# Patient Record
Sex: MALE | Race: WHITE | Employment: OTHER | URBAN - METROPOLITAN AREA
[De-identification: names, ages, dates, MRNs, and addresses within clinical notes are randomized per-mention and may not be internally consistent; named-entity substitution may affect disease eponyms.]

---

## 2019-05-01 ENCOUNTER — HOSPITAL ENCOUNTER (EMERGENCY)
Facility: CLINIC | Age: 69
Discharge: STILL ADMITTED OR ADMINISTRATIVE | End: 2019-05-01
Attending: EMERGENCY MEDICINE
Payer: COMMERCIAL

## 2019-05-01 ENCOUNTER — NURSE TRIAGE (OUTPATIENT)
Dept: NURSING | Facility: CLINIC | Age: 69
End: 2019-05-01

## 2019-05-01 ENCOUNTER — HOSPITAL ENCOUNTER (EMERGENCY)
Facility: CLINIC | Age: 69
Discharge: HOME | End: 2019-05-01
Attending: EMERGENCY MEDICINE | Admitting: EMERGENCY MEDICINE
Payer: COMMERCIAL

## 2019-05-01 VITALS
TEMPERATURE: 97.6 F | BODY MASS INDEX: 28.43 KG/M2 | DIASTOLIC BLOOD PRESSURE: 89 MMHG | WEIGHT: 187 LBS | SYSTOLIC BLOOD PRESSURE: 162 MMHG | OXYGEN SATURATION: 99 % | RESPIRATION RATE: 16 BRPM | HEART RATE: 78 BPM

## 2019-05-01 DIAGNOSIS — K80.50 BILIARY COLIC: ICD-10-CM

## 2019-05-01 DIAGNOSIS — K21.00 GASTROESOPHAGEAL REFLUX DISEASE WITH ESOPHAGITIS: ICD-10-CM

## 2019-05-01 DIAGNOSIS — R10.13 EPIGASTRIC PAIN: ICD-10-CM

## 2019-05-01 LAB
ALBUMIN SERPL-MCNC: 4 G/DL (ref 3.4–5)
ALP SERPL-CCNC: 118 U/L (ref 40–150)
ALT SERPL W P-5'-P-CCNC: 19 U/L (ref 0–70)
ANION GAP SERPL CALCULATED.3IONS-SCNC: 7 MMOL/L (ref 3–14)
AST SERPL W P-5'-P-CCNC: 9 U/L (ref 0–45)
BASOPHILS # BLD AUTO: 0.1 10E9/L (ref 0–0.2)
BASOPHILS NFR BLD AUTO: 0.8 %
BILIRUB SERPL-MCNC: 0.4 MG/DL (ref 0.2–1.3)
BUN SERPL-MCNC: 17 MG/DL (ref 7–30)
CALCIUM SERPL-MCNC: 9.1 MG/DL (ref 8.5–10.1)
CHLORIDE SERPL-SCNC: 105 MMOL/L (ref 94–109)
CO2 SERPL-SCNC: 27 MMOL/L (ref 20–32)
CREAT SERPL-MCNC: 1.02 MG/DL (ref 0.66–1.25)
DIFFERENTIAL METHOD BLD: NORMAL
EOSINOPHIL # BLD AUTO: 0.2 10E9/L (ref 0–0.7)
EOSINOPHIL NFR BLD AUTO: 1.7 %
ERYTHROCYTE [DISTWIDTH] IN BLOOD BY AUTOMATED COUNT: 12.9 % (ref 10–15)
GFR SERPL CREATININE-BSD FRML MDRD: 76 ML/MIN/{1.73_M2}
GLUCOSE SERPL-MCNC: 103 MG/DL (ref 70–99)
HCT VFR BLD AUTO: 43.3 % (ref 40–53)
HGB BLD-MCNC: 14.6 G/DL (ref 13.3–17.7)
IMM GRANULOCYTES # BLD: 0 10E9/L (ref 0–0.4)
IMM GRANULOCYTES NFR BLD: 0.2 %
LIPASE SERPL-CCNC: 209 U/L (ref 73–393)
LYMPHOCYTES # BLD AUTO: 1.9 10E9/L (ref 0.8–5.3)
LYMPHOCYTES NFR BLD AUTO: 18.5 %
MCH RBC QN AUTO: 29.1 PG (ref 26.5–33)
MCHC RBC AUTO-ENTMCNC: 33.7 G/DL (ref 31.5–36.5)
MCV RBC AUTO: 86 FL (ref 78–100)
MONOCYTES # BLD AUTO: 1.1 10E9/L (ref 0–1.3)
MONOCYTES NFR BLD AUTO: 11.1 %
NEUTROPHILS # BLD AUTO: 6.9 10E9/L (ref 1.6–8.3)
NEUTROPHILS NFR BLD AUTO: 67.7 %
NRBC # BLD AUTO: 0 10*3/UL
NRBC BLD AUTO-RTO: 0 /100
PLATELET # BLD AUTO: 242 10E9/L (ref 150–450)
POTASSIUM SERPL-SCNC: 3.4 MMOL/L (ref 3.4–5.3)
PROT SERPL-MCNC: 7.7 G/DL (ref 6.8–8.8)
RBC # BLD AUTO: 5.01 10E12/L (ref 4.4–5.9)
SODIUM SERPL-SCNC: 139 MMOL/L (ref 133–144)
WBC # BLD AUTO: 10.1 10E9/L (ref 4–11)

## 2019-05-01 PROCEDURE — 25000128 H RX IP 250 OP 636: Performed by: EMERGENCY MEDICINE

## 2019-05-01 PROCEDURE — 99285 EMERGENCY DEPT VISIT HI MDM: CPT | Mod: 25 | Performed by: EMERGENCY MEDICINE

## 2019-05-01 PROCEDURE — 76705 ECHO EXAM OF ABDOMEN: CPT | Performed by: EMERGENCY MEDICINE

## 2019-05-01 PROCEDURE — 25000132 ZZH RX MED GY IP 250 OP 250 PS 637: Performed by: EMERGENCY MEDICINE

## 2019-05-01 PROCEDURE — 83690 ASSAY OF LIPASE: CPT | Performed by: EMERGENCY MEDICINE

## 2019-05-01 PROCEDURE — 80053 COMPREHEN METABOLIC PANEL: CPT | Performed by: EMERGENCY MEDICINE

## 2019-05-01 PROCEDURE — 85025 COMPLETE CBC W/AUTO DIFF WBC: CPT | Performed by: EMERGENCY MEDICINE

## 2019-05-01 PROCEDURE — 96374 THER/PROPH/DIAG INJ IV PUSH: CPT | Performed by: EMERGENCY MEDICINE

## 2019-05-01 PROCEDURE — 76705 ECHO EXAM OF ABDOMEN: CPT | Mod: 26 | Performed by: EMERGENCY MEDICINE

## 2019-05-01 PROCEDURE — 99284 EMERGENCY DEPT VISIT MOD MDM: CPT | Mod: 25 | Performed by: EMERGENCY MEDICINE

## 2019-05-01 PROCEDURE — 25000125 ZZHC RX 250: Performed by: EMERGENCY MEDICINE

## 2019-05-01 RX ORDER — KETOROLAC TROMETHAMINE 15 MG/ML
15 INJECTION, SOLUTION INTRAMUSCULAR; INTRAVENOUS ONCE
Status: COMPLETED | OUTPATIENT
Start: 2019-05-01 | End: 2019-05-01

## 2019-05-01 RX ORDER — CARVEDILOL 6.25 MG/1
6.25 TABLET ORAL 2 TIMES DAILY WITH MEALS
COMMUNITY
End: 2019-12-06

## 2019-05-01 RX ORDER — AMLODIPINE BESYLATE 10 MG/1
10 TABLET ORAL DAILY
COMMUNITY
End: 2019-11-05

## 2019-05-01 RX ADMIN — LIDOCAINE HYDROCHLORIDE 30 ML: 20 SOLUTION ORAL; TOPICAL at 03:45

## 2019-05-01 RX ADMIN — KETOROLAC TROMETHAMINE 15 MG: 15 INJECTION, SOLUTION INTRAMUSCULAR; INTRAVENOUS at 03:49

## 2019-05-01 ASSESSMENT — ENCOUNTER SYMPTOMS
BLOOD IN STOOL: 0
NAUSEA: 1
ABDOMINAL PAIN: 1
DIARRHEA: 0
FEVER: 0
VOMITING: 1
SHORTNESS OF BREATH: 0
CONSTIPATION: 0

## 2019-05-01 NOTE — ED PROVIDER NOTES
History     Chief Complaint   Patient presents with     Abdominal Pain     pain, fullness, vomiting     HPI  Arley Lopez is a 68 year old male who has past medical history significant for GERD, hypertension, presenting to the emergency department with concerns regarding abdominal pain.  Patient states that he had a large meal, on both Saturday, in addition to Sunday.  Patient states he normally does not eat such significant large meals, however did have steak, and other foods 2 and 3 days ago.  The following day patient subsequently had yellowish vomit.  Those symptoms had since resolved.  Patient presents to the emergency department this evening because of upper abdominal fullness sensation, with achiness, and burning type sensation of the upper abdomen.  There is no vomiting during the day yesterday.  Patient did have hotdogs for dinner, and began experiencing worsening pain, especially with lying down this evening.  Last bowel movement was during the day, and reported to be normal.  No diarrhea.  No fever, or chills.  Denies any chest pain, cough, shortness of breath.    Allergies:  Allergies   Allergen Reactions     Penicillin G Hives and Swelling       Problem List:    Patient Active Problem List    Diagnosis Date Noted     CARDIOVASCULAR SCREENING; LDL GOAL LESS THAN 130 02/27/2013     Priority: Medium     GERD (gastroesophageal reflux disease) 07/12/2012     Priority: Medium     Essential hypertension 07/12/2012     Priority: Medium        Past Medical History:    Past Medical History:   Diagnosis Date     Kidney problem        Past Surgical History:    Past Surgical History:   Procedure Laterality Date     BACK SURGERY       COLONOSCOPY  May 2012    abnormal !     ENDOSCOPY  01/10/2011    Reflux duodenum, Hiatal hernia small, otherwise normal exam.     GENITOURINARY SURGERY      vasectomy     GENITOURINARY SURGERY      spermacele       Family History:    Family History   Problem Relation Age of Onset      Cancer Mother        Social History:  Marital Status:   [2]  Social History     Tobacco Use     Smoking status: Never Smoker     Smokeless tobacco: Never Used   Substance Use Topics     Alcohol use: Yes     Drug use: No        Medications:      amLODIPine (NORVASC) 10 MG tablet   carvedilol (COREG) 6.25 MG tablet   aspirin 81 MG tablet   ibuprofen (ADVIL,MOTRIN) 600 MG tablet   omeprazole (PRILOSEC) 40 MG capsule   ORDER FOR DME   ORDER FOR DME   ORDER FOR DME         Review of Systems   Constitutional: Negative for fever.   Respiratory: Negative for shortness of breath.    Cardiovascular: Negative for chest pain.   Gastrointestinal: Positive for abdominal pain, nausea and vomiting. Negative for blood in stool, constipation and diarrhea.   All other systems reviewed and are negative.      Physical Exam   BP: (!) 181/96  Heart Rate: 92  Temp: 97.6  F (36.4  C)  Resp: 16  Weight: 84.8 kg (187 lb)  SpO2: 100 %      Physical Exam  BP (!) 181/96   Temp 97.6  F (36.4  C) (Oral)   Resp 16   Wt 84.8 kg (187 lb)   SpO2 100%   BMI 28.43 kg/m    General: alert, interactive, in no apparent distress  Head: atraumatic  Nose: no rhinorrhea or epistaxis  Ears: no external auditory canal discharge or bleeding.    Eyes: Sclera nonicteric. Conjunctiva noninjected. PERRL, EOMI  Mouth: no tonsillar erythema, edema, or exudate  Neck: supple, no palp LAD  Lungs: CTAB  CV: RRR, S1/S2; peripheral pulses palpable and symmetric  Abdomen: soft, slight tenderness to palpation of the upper mid abdomen., nd, no guarding or rebound. Positive bowel sounds  Extremities: no cyanosis or edema  Skin: no rash or diaphoresis  Neuro:   strength 5/5 in UE and LEs bilaterally, sensation intact to light touch in UE and LEs bilaterally;       ED Course        Procedures    Results for orders placed during the hospital encounter of 05/01/19   POC US ABDOMEN LIMITED    [facility] Procedure Note      Limited Bedside ED Gallbladder   Ultrasound:    PROCEDURE: PERFORMED BY: Dr. Kevin Cortes  INDICATIONS:  RUQ/Epigastric Pain  PROBE:  Low frequency convex probe  BODY LOCATION: Abdomen  FINDINGS:   An ultrasound of the gallbladder was performed using longitudinal and transverse views.  Gallstone(s):  Present  Gallbladder sludge:  Absent  Sonographic Jones's sign:  Absent  Gallbladder wall thickening (greater than 4 mm):  Absent  Pericholecystic fluid: Absent  Common bile duct (dilated if internal diameter greater than 6 mm): Unable to visualize   INTERPRETATION:  Gallstones are present.  IMAGE DOCUMENTATION: Images were archived to PACs system.               Critical Care time:  none               Results for orders placed or performed during the hospital encounter of 05/01/19 (from the past 24 hour(s))   POC US ABDOMEN LIMITED    [facility] Procedure Note      Limited Bedside ED Gallbladder  Ultrasound:    PROCEDURE: PERFORMED BY: Dr. Kevin Cortes  INDICATIONS:  RUQ/Epigastric Pain  PROBE:  Low frequency convex probe  BODY LOCATION: Abdomen  FINDINGS:   An ultrasound of the gallbladder was performed using longitudinal and transverse views.  Gallstone(s):  Present  Gallbladder sludge:  Absent  Sonographic Jones's sign:  Absent  Gallbladder wall thickening (greater than 4 mm):  Absent  Pericholecystic fluid: Absent  Common bile duct (dilated if internal diameter greater than 6 mm): Unable to visualize   INTERPRETATION:  Gallstones are present.  IMAGE DOCUMENTATION: Images were archived to PACs system.     CBC with platelets differential   Result Value Ref Range    WBC 10.1 4.0 - 11.0 10e9/L    RBC Count 5.01 4.4 - 5.9 10e12/L    Hemoglobin 14.6 13.3 - 17.7 g/dL    Hematocrit 43.3 40.0 - 53.0 %    MCV 86 78 - 100 fl    MCH 29.1 26.5 - 33.0 pg    MCHC 33.7 31.5 - 36.5 g/dL    RDW 12.9 10.0 - 15.0 %    Platelet Count 242 150 - 450 10e9/L    Diff Method Automated Method     % Neutrophils 67.7 %    % Lymphocytes 18.5 %     % Monocytes 11.1 %    % Eosinophils 1.7 %    % Basophils 0.8 %    % Immature Granulocytes 0.2 %    Nucleated RBCs 0 0 /100    Absolute Neutrophil 6.9 1.6 - 8.3 10e9/L    Absolute Lymphocytes 1.9 0.8 - 5.3 10e9/L    Absolute Monocytes 1.1 0.0 - 1.3 10e9/L    Absolute Eosinophils 0.2 0.0 - 0.7 10e9/L    Absolute Basophils 0.1 0.0 - 0.2 10e9/L    Abs Immature Granulocytes 0.0 0 - 0.4 10e9/L    Absolute Nucleated RBC 0.0    Comprehensive metabolic panel   Result Value Ref Range    Sodium 139 133 - 144 mmol/L    Potassium 3.4 3.4 - 5.3 mmol/L    Chloride 105 94 - 109 mmol/L    Carbon Dioxide 27 20 - 32 mmol/L    Anion Gap 7 3 - 14 mmol/L    Glucose 103 (H) 70 - 99 mg/dL    Urea Nitrogen 17 7 - 30 mg/dL    Creatinine 1.02 0.66 - 1.25 mg/dL    GFR Estimate 76 >60 mL/min/[1.73_m2]    GFR Estimate If Black 88 >60 mL/min/[1.73_m2]    Calcium 9.1 8.5 - 10.1 mg/dL    Bilirubin Total 0.4 0.2 - 1.3 mg/dL    Albumin 4.0 3.4 - 5.0 g/dL    Protein Total 7.7 6.8 - 8.8 g/dL    Alkaline Phosphatase 118 40 - 150 U/L    ALT 19 0 - 70 U/L    AST 9 0 - 45 U/L   Lipase   Result Value Ref Range    Lipase 209 73 - 393 U/L       Medications   lidocaine VISCOUS (XYLOCAINE) 2 % 15 mL, alum & mag hydroxide-simethicone (MYLANTA ES/MAALOX  ES) 15 mL GI Cocktail (30 mLs Oral Given 5/1/19 0345)   ketorolac (TORADOL) injection 15 mg (15 mg Intravenous Given 5/1/19 0349)       Assessments & Plan (with Medical Decision Making)  68 year old male, with history of GERD, and hypertension, presenting to the emergency department with concerns regarding upper abdominal fullness sensation that worsened during the evening and nighttime hours this evening.  Patient arrives afebrile, well-appearing, slightly hypertensive.  He does have slight amounts of tenderness of the epigastric region.  Differential includes gastritis, GERD, biliary colic, viral illness, pancreatitis.    IV established, and labs drawn.  Bedside ultrasound performed showing gallstone which  is present, however negative sonographic Jones's, with no pericholecystic fluid, normal gallbladder wall thickness.  Therefore, do not feel that acute cholecystitis is likely.  Additionally white blood cell count is normal.  CMP and lipase normal.  Patient did have vomiting after eating fattening type meal, with pain after eating steak, and pork meal.  Additionally, patient had heart drugs this evening.  Therefore, I feel that biliary colic is likely a contributing cause of his symptoms.  Patient also with known GERD.  May have component of GERD/gastritis as well.  Patient encouraged continued omeprazole.  Maalox as needed.  Follow-up in clinic, and return if worsening symptoms.  Consider outpatient surgery referral if ongoing symptoms.     I have reviewed the nursing notes.    I have reviewed the findings, diagnosis, plan and need for follow up with the patient.          Medication List      There are no discharge medications for this visit.         Final diagnoses:   Epigastric pain   Biliary colic   Gastroesophageal reflux disease with esophagitis       5/1/2019   [facility] EMERGENCY DEPARTMENT     Kevin Cortes MD  05/01/19 0433

## 2019-05-01 NOTE — ED AVS SNAPSHOT
[facility] Emergency Department  [address] [address]  Phone:  [phone]  Fax:  [phone]                                    Arley Lopez   MRN: [id number]    Department:  [facility] Emergency Department   Date of Visit:  5/1/2019           After Visit Summary Signature Page    I have received my discharge instructions, and my questions have been answered. I have discussed any challenges I see with this plan with the nurse or doctor.    ..........................................................................................................................................  Patient/Patient Representative Signature      ..........................................................................................................................................  Patient Representative Print Name and Relationship to Patient    ..................................................               ................................................  Date                                   Time    ..........................................................................................................................................  Reviewed by Signature/Title    ...................................................              ..............................................  Date                                               Time          22EPIC Rev 08/18

## 2019-05-01 NOTE — TELEPHONE ENCOUNTER
Patient wants to skip the step of going to the clinic before seeing a surgeon.  I told him he can call [employer] to get a list of surgeons that do gall bladder surgery to set up that appointment then contact a clinic to get the pre-op physical set up.  Ashlee Almazan RN-[facility]

## 2019-05-01 NOTE — DISCHARGE INSTRUCTIONS
Recommend continuing omeprazole.   Can take zantac/pepcid or tums.   Can also try maalox with the pains.   Follow up in clinic if ongoing symptoms.

## 2019-05-03 ENCOUNTER — OFFICE VISIT (OUTPATIENT)
Dept: SURGERY | Facility: CLINIC | Age: 69
End: 2019-05-03
Payer: COMMERCIAL

## 2019-05-03 VITALS
TEMPERATURE: 98.3 F | HEIGHT: 68 IN | BODY MASS INDEX: 28.34 KG/M2 | HEART RATE: 76 BPM | DIASTOLIC BLOOD PRESSURE: 79 MMHG | SYSTOLIC BLOOD PRESSURE: 134 MMHG | WEIGHT: 187 LBS

## 2019-05-03 DIAGNOSIS — R10.13 EPIGASTRIC PAIN: Primary | ICD-10-CM

## 2019-05-03 PROCEDURE — 99204 OFFICE O/P NEW MOD 45 MIN: CPT | Performed by: SURGERY

## 2019-05-03 ASSESSMENT — MIFFLIN-ST. JEOR: SCORE: 1594.79

## 2019-05-03 NOTE — PROGRESS NOTES
68-year-old male recently seen in the emergency room complaining of epigastric upper abdominal pain.  This last weekend patient had a steak and potatoes one night and a pork chop the following day.  Afterwards, he had severe pain in his epigastrium without radiation.  He reported nausea and vomited a thin yellow substance.  He did well on Monday but on Tuesday he ate a hot dog which reproduce the symptoms and by 1 AM he presented to the emergency room with severe epigastric abdominal pain with a feeling of bloating.  Patient had a GI cocktail that almost immediately relieved his symptoms.  An ultrasound was done in the emergency room showing several small stones in the gallbladder.    Patient Active Problem List   Diagnosis     GERD (gastroesophageal reflux disease)     Essential hypertension     CARDIOVASCULAR SCREENING; LDL GOAL LESS THAN 130       Past Medical History:   Diagnosis Date     Kidney problem        Past Surgical History:   Procedure Laterality Date     BACK SURGERY       COLONOSCOPY  May 2012    abnormal !     ENDOSCOPY  01/10/2011    Reflux duodenum, Hiatal hernia small, otherwise normal exam.     GENITOURINARY SURGERY      vasectomy     GENITOURINARY SURGERY      spermacele       Family History   Problem Relation Age of Onset     Cancer Mother        Social History     Tobacco Use     Smoking status: Never Smoker     Smokeless tobacco: Never Used   Substance Use Topics     Alcohol use: Yes        History   Drug Use No       Current Outpatient Medications   Medication Sig Dispense Refill     amLODIPine (NORVASC) 10 MG tablet Take 10 mg by mouth daily       carvedilol (COREG) 6.25 MG tablet Take 6.25 mg by mouth 2 times daily (with meals)       omeprazole (PRILOSEC) 40 MG capsule Take 1 capsule (40 mg) by mouth 2 times daily (Patient taking differently: Take 20 mg by mouth 2 times daily ) 180 capsule 2     aspirin 81 MG tablet Take 1 tablet by mouth daily.       ibuprofen (ADVIL,MOTRIN) 600 MG tablet  Take 1 tablet (600 mg) by mouth every 6 hours as needed for moderate pain 90 tablet 0     ORDER FOR DME medium CAM walker - short 1 Device 1     ORDER FOR DME Larry Ankle Brace - Large Tri Tatiana 1 Device 0     ORDER FOR DME Superfeet inserts 1 Device 0       Allergies   Allergen Reactions     Penicillin G Hives and Swelling      CBC  Recent Labs   Lab Test 05/01/19  0342   WBC 10.1   RBC 5.01   HGB 14.6   HCT 43.3   MCV 86   MCH 29.1   MCHC 33.7   RDW 12.9          BMP  Recent Labs   Lab Test 05/01/19  0342      POTASSIUM 3.4   YARELY 9.1   CHLORIDE 105   CO2 27   BUN 17   CR 1.02   *       LFTs  Recent Labs   Lab Test 05/01/19  0342   PROTTOTAL 7.7   ALBUMIN 4.0   BILITOTAL 0.4   ALKPHOS 118   AST 9   ALT 19     Results for orders placed or performed during the hospital encounter of 05/01/19   POC US ABDOMEN LIMITED    [facility] Procedure Note      Limited Bedside ED Gallbladder  Ultrasound:    PROCEDURE: PERFORMED BY: Dr. Kevin Cortes  INDICATIONS:  RUQ/Epigastric Pain  PROBE:  Low frequency convex probe  BODY LOCATION: Abdomen  FINDINGS:   An ultrasound of the gallbladder was performed using longitudinal and transverse views.  Gallstone(s):  Present  Gallbladder sludge:  Absent  Sonographic Jones's sign:  Absent  Gallbladder wall thickening (greater than 4 mm):  Absent  Pericholecystic fluid: Absent  Common bile duct (dilated if internal diameter greater than 6 mm): Unable to visualize   INTERPRETATION:  Gallstones are present.  IMAGE DOCUMENTATION: Images were archived to PACs system.       ROS  Constitutional - Denies fevers, weight loss, malaise, lethargy  Neuro - Denies tremors or seizures  Pulmon - Denies SOB, dyspnea, hemoptysis, chronic cough or use of an inhaler  CV - Denies CP, SOB, lower extremity edema, difficulty w/ stairs, has never used NTG  GI - Denies hematemesis, BRBPR, melena, chronic diarrhea or epigastric pain   - Denies hematuria, difficulty  "voiding, h/o STDs  Hematology - Denies blood clotting disorders, chronic anemias  Dermatology - No melanomas or skin cancers  Rheumatology - No h/o RA  Pysch - Denies depression, bipolar d/o or schizophrenia    Exam:/79 (BP Location: Right arm, Patient Position: Sitting, Cuff Size: Adult Regular)   Pulse 76   Temp 98.3  F (36.8  C) (Tympanic)   Ht 1.715 m (5' 7.5\")   Wt 84.8 kg (187 lb)   BMI 28.86 kg/m      General - Alert and Oriented X4, NAD, well nourished  HEENT - Normocephalic, atraumatic  Neck - supple, no LAD, Thyroid normal, Carotids without bruits  Lungs - Clear to auscultation bilaterally with good inspiratory effort, no tactile fremitus  CV - Heart RRR, no lift's, thrills, murmurs, rubs, or gallops. Carotid, radial, and femoral pulses 2+ bilaterally  Abdomen - Soft, non-tender, +BS, no hepatosplenomegaly, no palpable masses  Neuro - Full ROM, Strength 5/5 and major muscle groups, sensation intact  Extremities - No cyanosis, clubbing or edema    Assessment and plan: 68-year-old male with epigastric abdominal pain following eating heavy foods such as steak.  Unsure whether the gallstones seen on the bedside ultrasound are causing his symptoms.  I will go ahead and order a formal ultrasound to better evaluate the size and location of the stones.  His symptoms are more consistent with acid indigestion and reflux.  I cannot recommend surgery at this time.  However, patient is planning on eating pizza tonight and he is going to be very aware of his symptoms afterwards.  Also, I have asked him to see me after the ultrasound and we can reevaluate these new images plus his symptoms from this coming weekend, to see if he is a candidate for cholecystectomy.  Return to clinic next Wednesday after the ultrasound    Yehuda Puga MD   "

## 2019-05-03 NOTE — LETTER
5/3/2019         RE: Arley Lopez  [address]  [place] [address]        Dear Colleague,    Thank you for referring your patient, Arley Lopez, to the [facility]. Please see a copy of my visit note below.    68-year-old male recently seen in the emergency room complaining of epigastric upper abdominal pain.  This last weekend patient had a steak and potatoes one night and a pork chop the following day.  Afterwards, he had severe pain in his epigastrium without radiation.  He reported nausea and vomited a thin yellow substance.  He did well on Monday but on Tuesday he ate a hot dog which reproduce the symptoms and by 1 AM he presented to the emergency room with severe epigastric abdominal pain with a feeling of bloating.  Patient had a GI cocktail that almost immediately relieved his symptoms.  An ultrasound was done in the emergency room showing several small stones in the gallbladder.    Patient Active Problem List   Diagnosis     GERD (gastroesophageal reflux disease)     Essential hypertension     CARDIOVASCULAR SCREENING; LDL GOAL LESS THAN 130       Past Medical History:   Diagnosis Date     Kidney problem        Past Surgical History:   Procedure Laterality Date     BACK SURGERY       COLONOSCOPY  May 2012    abnormal !     ENDOSCOPY  01/10/2011    Reflux duodenum, Hiatal hernia small, otherwise normal exam.     GENITOURINARY SURGERY      vasectomy     GENITOURINARY SURGERY      spermacele       Family History   Problem Relation Age of Onset     Cancer Mother        Social History     Tobacco Use     Smoking status: Never Smoker     Smokeless tobacco: Never Used   Substance Use Topics     Alcohol use: Yes        History   Drug Use No       Current Outpatient Medications   Medication Sig Dispense Refill     amLODIPine (NORVASC) 10 MG tablet Take 10 mg by mouth daily       carvedilol (COREG) 6.25 MG tablet Take 6.25 mg by mouth 2 times daily (with meals)       omeprazole (PRILOSEC) 40  MG capsule Take 1 capsule (40 mg) by mouth 2 times daily (Patient taking differently: Take 20 mg by mouth 2 times daily ) 180 capsule 2     aspirin 81 MG tablet Take 1 tablet by mouth daily.       ibuprofen (ADVIL,MOTRIN) 600 MG tablet Take 1 tablet (600 mg) by mouth every 6 hours as needed for moderate pain 90 tablet 0     ORDER FOR DME medium CAM walker - short 1 Device 1     ORDER FOR DME Larry Ankle Brace - Large Tri Tataina 1 Device 0     ORDER FOR DME Superfeet inserts 1 Device 0       Allergies   Allergen Reactions     Penicillin G Hives and Swelling      CBC  Recent Labs   Lab Test 05/01/19  0342   WBC 10.1   RBC 5.01   HGB 14.6   HCT 43.3   MCV 86   MCH 29.1   MCHC 33.7   RDW 12.9          BMP  Recent Labs   Lab Test 05/01/19  0342      POTASSIUM 3.4   YARELY 9.1   CHLORIDE 105   CO2 27   BUN 17   CR 1.02   *       LFTs  Recent Labs   Lab Test 05/01/19  0342   PROTTOTAL 7.7   ALBUMIN 4.0   BILITOTAL 0.4   ALKPHOS 118   AST 9   ALT 19     Results for orders placed or performed during the hospital encounter of 05/01/19   POC US ABDOMEN LIMITED    [facility] Procedure Note      Limited Bedside ED Gallbladder  Ultrasound:    PROCEDURE: PERFORMED BY: Dr. Kevin Cortes  INDICATIONS:  RUQ/Epigastric Pain  PROBE:  Low frequency convex probe  BODY LOCATION: Abdomen  FINDINGS:   An ultrasound of the gallbladder was performed using longitudinal and transverse views.  Gallstone(s):  Present  Gallbladder sludge:  Absent  Sonographic Jones's sign:  Absent  Gallbladder wall thickening (greater than 4 mm):  Absent  Pericholecystic fluid: Absent  Common bile duct (dilated if internal diameter greater than 6 mm): Unable to visualize   INTERPRETATION:  Gallstones are present.  IMAGE DOCUMENTATION: Images were archived to PACs system.       ROS  Constitutional - Denies fevers, weight loss, malaise, lethargy  Neuro - Denies tremors or seizures  Pulmon - Denies SOB, dyspnea, hemoptysis,  "chronic cough or use of an inhaler  CV - Denies CP, SOB, lower extremity edema, difficulty w/ stairs, has never used NTG  GI - Denies hematemesis, BRBPR, melena, chronic diarrhea or epigastric pain   - Denies hematuria, difficulty voiding, h/o STDs  Hematology - Denies blood clotting disorders, chronic anemias  Dermatology - No melanomas or skin cancers  Rheumatology - No h/o RA  Pysch - Denies depression, bipolar d/o or schizophrenia    Exam:/79 (BP Location: Right arm, Patient Position: Sitting, Cuff Size: Adult Regular)   Pulse 76   Temp 98.3  F (36.8  C) (Tympanic)   Ht 1.715 m (5' 7.5\")   Wt 84.8 kg (187 lb)   BMI 28.86 kg/m       General - Alert and Oriented X4, NAD, well nourished  HEENT - Normocephalic, atraumatic  Neck - supple, no LAD, Thyroid normal, Carotids without bruits  Lungs - Clear to auscultation bilaterally with good inspiratory effort, no tactile fremitus  CV - Heart RRR, no lift's, thrills, murmurs, rubs, or gallops. Carotid, radial, and femoral pulses 2+ bilaterally  Abdomen - Soft, non-tender, +BS, no hepatosplenomegaly, no palpable masses  Neuro - Full ROM, Strength 5/5 and major muscle groups, sensation intact  Extremities - No cyanosis, clubbing or edema    Assessment and plan: 68-year-old male with epigastric abdominal pain following eating heavy foods such as steak.  Unsure whether the gallstones seen on the bedside ultrasound are causing his symptoms.  I will go ahead and order a formal ultrasound to better evaluate the size and location of the stones.  His symptoms are more consistent with acid indigestion and reflux.  I cannot recommend surgery at this time.  However, patient is planning on eating pizza tonight and he is going to be very aware of his symptoms afterwards.  Also, I have asked him to see me after the ultrasound and we can reevaluate these new images plus his symptoms from this coming weekend, to see if he is a candidate for cholecystectomy.  Return to clinic " next Wednesday after the ultrasound    Yehuda Puga MD     Again, thank you for allowing me to participate in the care of your patient.        Sincerely,        Yehuda Puga MD

## 2019-05-03 NOTE — NURSING NOTE
"Initial /79 (BP Location: Right arm, Patient Position: Sitting, Cuff Size: Adult Regular)   Pulse 76   Temp 98.3  F (36.8  C) (Tympanic)   Ht 1.715 m (5' 7.5\")   Wt 84.8 kg (187 lb)   BMI 28.86 kg/m   Estimated body mass index is 28.86 kg/m  as calculated from the following:    Height as of this encounter: 1.715 m (5' 7.5\").    Weight as of this encounter: 84.8 kg (187 lb). .    Katie Black MA    "

## 2019-05-06 ENCOUNTER — HOSPITAL ENCOUNTER (OUTPATIENT)
Dept: ULTRASOUND IMAGING | Facility: CLINIC | Age: 69
Discharge: HOME | End: 2019-05-06
Attending: SURGERY | Admitting: SURGERY
Payer: COMMERCIAL

## 2019-05-06 DIAGNOSIS — R10.13 EPIGASTRIC PAIN: ICD-10-CM

## 2019-05-06 PROCEDURE — 76705 ECHO EXAM OF ABDOMEN: CPT

## 2019-05-08 ENCOUNTER — OFFICE VISIT (OUTPATIENT)
Dept: SURGERY | Facility: CLINIC | Age: 69
End: 2019-05-08
Payer: COMMERCIAL

## 2019-05-08 VITALS
BODY MASS INDEX: 28.34 KG/M2 | HEIGHT: 68 IN | SYSTOLIC BLOOD PRESSURE: 157 MMHG | DIASTOLIC BLOOD PRESSURE: 80 MMHG | TEMPERATURE: 98.2 F | HEART RATE: 81 BPM | WEIGHT: 187 LBS

## 2019-05-08 DIAGNOSIS — R10.13 EPIGASTRIC PAIN: Primary | ICD-10-CM

## 2019-05-08 PROCEDURE — 99212 OFFICE O/P EST SF 10 MIN: CPT | Performed by: SURGERY

## 2019-05-08 RX ORDER — OMEPRAZOLE 40 MG/1
40 CAPSULE, DELAYED RELEASE ORAL 2 TIMES DAILY
Qty: 180 CAPSULE | Refills: 3 | Status: ON HOLD | OUTPATIENT
Start: 2019-05-08 | End: 2019-11-25

## 2019-05-08 ASSESSMENT — MIFFLIN-ST. JEOR: SCORE: 1594.79

## 2019-05-08 NOTE — NURSING NOTE
"Initial /80 (BP Location: Right arm, Patient Position: Sitting, Cuff Size: Adult Regular)   Pulse 81   Temp 98.2  F (36.8  C) (Tympanic)   Ht 1.715 m (5' 7.5\")   Wt 84.8 kg (187 lb)   BMI 28.86 kg/m   Estimated body mass index is 28.86 kg/m  as calculated from the following:    Height as of this encounter: 1.715 m (5' 7.5\").    Weight as of this encounter: 84.8 kg (187 lb). .    Katie Black MA    "

## 2019-05-08 NOTE — PROGRESS NOTES
Patient here for follow-up visit.  Patient had an outpatient ultrasound which showed a few small gallstones in his gallbladder without evidence of ductal dilatation or wall thickening.  Patient ate pizza last week with no adverse effects.  He still reports reflux and heartburn when he eats a heavy meal such as steak or pork chops.  He takes Prilosec for these.  He currently takes Prilosec 20 mg in the morning and nothing in the evening.    Assessment and plan: 68-year-old male with asymptomatic cholelithiasis.  I cannot recommend laparoscopic cholecystectomy as as I do not believe his gallstones are causing him problems.  I think his major problem is acid reflux after he eats a heavy meal.  I asked him to increase his Prilosec to 40 mg twice daily and avoid eating heavy meals within several hours of sleeping.  Patient understood and we will avoid gallbladder removal at this time and he will just work on his acid reflux.  He is welcome to return to clinic at any time.    Yehuda Puga MD

## 2019-05-08 NOTE — LETTER
5/8/2019         RE: Arley Lopez  [address]  [facility] [address]        Dear Colleague,    Thank you for referring your patient, Arley Lopez, to the [facility]. Please see a copy of my visit note below.    Patient here for follow-up visit.  Patient had an outpatient ultrasound which showed a few small gallstones in his gallbladder without evidence of ductal dilatation or wall thickening.  Patient ate pizza last week with no adverse effects.  He still reports reflux and heartburn when he eats a heavy meal such as steak or pork chops.  He takes Prilosec for these.  He currently takes Prilosec 20 mg in the morning and nothing in the evening.    Assessment and plan: 68-year-old male with asymptomatic cholelithiasis.  I cannot recommend laparoscopic cholecystectomy as as I do not believe his gallstones are causing him problems.  I think his major problem is acid reflux after he eats a heavy meal.  I asked him to increase his Prilosec to 40 mg twice daily and avoid eating heavy meals within several hours of sleeping.  Patient understood and we will avoid gallbladder removal at this time and he will just work on his acid reflux.  He is welcome to return to clinic at any time.    Yehuda Puga MD    Again, thank you for allowing me to participate in the care of your patient.        Sincerely,        Yehuda Puga MD

## 2019-05-10 ENCOUNTER — TELEPHONE (OUTPATIENT)
Dept: SURGERY | Facility: CLINIC | Age: 69
End: 2019-05-10

## 2019-05-10 DIAGNOSIS — K21.9 GERD (GASTROESOPHAGEAL REFLUX DISEASE): ICD-10-CM

## 2019-05-10 RX ORDER — OMEPRAZOLE 40 MG/1
40 CAPSULE, DELAYED RELEASE ORAL 2 TIMES DAILY
Qty: 180 CAPSULE | Refills: 2 | Status: SHIPPED | OUTPATIENT
Start: 2019-05-10 | End: 2019-11-05

## 2019-05-10 NOTE — TELEPHONE ENCOUNTER
[facility] is asking if dosage increase is correct for Omeprazole? Please call [phone]

## 2019-11-05 ENCOUNTER — ANCILLARY PROCEDURE (OUTPATIENT)
Dept: GENERAL RADIOLOGY | Facility: CLINIC | Age: 69
End: 2019-11-05
Attending: FAMILY MEDICINE
Payer: COMMERCIAL

## 2019-11-05 ENCOUNTER — OFFICE VISIT (OUTPATIENT)
Dept: FAMILY MEDICINE | Facility: CLINIC | Age: 69
End: 2019-11-05
Payer: COMMERCIAL

## 2019-11-05 VITALS
HEART RATE: 94 BPM | HEIGHT: 67 IN | DIASTOLIC BLOOD PRESSURE: 78 MMHG | SYSTOLIC BLOOD PRESSURE: 138 MMHG | TEMPERATURE: 98.8 F | OXYGEN SATURATION: 97 % | BODY MASS INDEX: 28.47 KG/M2 | WEIGHT: 181.4 LBS

## 2019-11-05 DIAGNOSIS — M25.512 CHRONIC LEFT SHOULDER PAIN: ICD-10-CM

## 2019-11-05 DIAGNOSIS — G89.29 CHRONIC LEFT SHOULDER PAIN: ICD-10-CM

## 2019-11-05 DIAGNOSIS — Z13.1 SCREENING FOR DIABETES MELLITUS: ICD-10-CM

## 2019-11-05 DIAGNOSIS — I10 ESSENTIAL HYPERTENSION: Primary | ICD-10-CM

## 2019-11-05 DIAGNOSIS — Z13.220 LIPID SCREENING: ICD-10-CM

## 2019-11-05 PROCEDURE — G0008 ADMIN INFLUENZA VIRUS VAC: HCPCS | Performed by: FAMILY MEDICINE

## 2019-11-05 PROCEDURE — 73030 X-RAY EXAM OF SHOULDER: CPT | Mod: LT

## 2019-11-05 PROCEDURE — 90662 IIV NO PRSV INCREASED AG IM: CPT | Performed by: FAMILY MEDICINE

## 2019-11-05 PROCEDURE — 99204 OFFICE O/P NEW MOD 45 MIN: CPT | Mod: 25 | Performed by: FAMILY MEDICINE

## 2019-11-05 RX ORDER — HYDROCHLOROTHIAZIDE 25 MG/1
25 TABLET ORAL DAILY
Qty: 90 TABLET | Refills: 3 | Status: SHIPPED | OUTPATIENT
Start: 2019-11-05 | End: 2020-02-05

## 2019-11-05 ASSESSMENT — ENCOUNTER SYMPTOMS
JOINT SWELLING: 0
COUGH: 0
HEADACHES: 0
SHORTNESS OF BREATH: 0
CHILLS: 0
NERVOUS/ANXIOUS: 0
MYALGIAS: 0
WEAKNESS: 0
ARTHRALGIAS: 1
PALPITATIONS: 0
SORE THROAT: 0
PARESTHESIAS: 0
DIZZINESS: 0
FEVER: 0

## 2019-11-05 ASSESSMENT — MIFFLIN-ST. JEOR: SCORE: 1552.49

## 2019-11-05 NOTE — PATIENT INSTRUCTIONS
Jack Lopez,    Thank you for allowing [facility] to manage your care.    I ordered some fasting blood work, please go to the laboratory to get your laboratory studies.  Please call [phone] to scheduled your laboratory appointment.     I ordered some xrays, please go to our radiology department to get your xrays.    I sent your prescriptions to your pharmacy.    For you high blood pressure, I am starting you on hydrochlorothiazide 25 mg daily.  Please discontinue amlodipine.     I made a sports medicine referral, they will be in 1-2 weeks to set up your appointment.  If you do not hear from them, please call the specialty number on your after visit.     Please allow 1-2 business days for our office to call you in regards to your laboratory/radiological studies.  If not done so, I encourage you to login into [email or web address] to review your results as well.     If you have any questions or concerns, please feel free to call us at [phone].    Please call Dr. Kwok to schedule your colonoscopy.  Your sigmoidoscopy in 2011 is good for only 5 years.     Sincerely,    Dr. Rice    Did you know?  You can schedule an e-Visit for certain simple non-emergent issue for your convenience.  To learn more about or start an eVisit, simply login to [employer], click  Visits  on top banner, click  Start a Virtual Visit  drop down, and click  Symptom-Specific E-Visit

## 2019-11-05 NOTE — PROGRESS NOTES
Subjective     Arley Lopez is a 69 year old male who presents to clinic today for the following health issues:    HPI   Chief Complaint   Patient presents with     Establish Care       Past Medical History:   Diagnosis Date     Essential hypertension      GERD (gastroesophageal reflux disease)      Kidney problem        Past Surgical History:   Procedure Laterality Date     COLONOSCOPY  May 2012    abnormal !     ENDOSCOPY  01/10/2011    Reflux duodenum, Hiatal hernia small, otherwise normal exam.     FOOT SURGERY Left      GENITOURINARY SURGERY      vasectomy     GENITOURINARY SURGERY      spermacele       Family History   Problem Relation Age of Onset     Cancer Mother      Rheumatic fever Mother      Suicide Father        Social History     Tobacco Use     Smoking status: Former Smoker     Packs/day: 2.00     Years: 2.00     Pack years: 4.00     Last attempt to quit: 1992     Years since quittin.8     Smokeless tobacco: Never Used   Substance Use Topics     Alcohol use: Yes     Comment: beer per monthly     Current Outpatient Medications   Medication     amLODIPine (NORVASC) 10 MG tablet     carvedilol (COREG) 6.25 MG tablet     omeprazole (PRILOSEC) 40 MG DR capsule     No current facility-administered medications for this visit.         Allergies   Allergen Reactions     Penicillin G Hives and Swelling       1. Establish care    2. Hypertension: Patient is currently on amlodipine and coreg.  States that blood pressures 140-150s/70s.  Otherwise, he denies any chest pain or SOB.     3. Left foot pain: Involving toes and heal.  History of multiple surgeries (x2) due work trauma which required tendon repair.  This has been a chronic condition.  States of mild pain.  States of mild numbness.  He was seen in the past by [facility] in the past for his surgeries.     4. Left shoulder pain: Ongoing for the past 8-9 years.  States of difficulty with range of motion.  Feels like a grinding  "sensation.      5. Health maintenance: Last sigmoidoscopy was in 2011 with Dr. Kwok.     Review of Systems   Constitutional: Negative for chills and fever.   HENT: Negative for congestion, ear pain, hearing loss and sore throat.    Respiratory: Negative for cough and shortness of breath.    Cardiovascular: Negative for chest pain, palpitations and peripheral edema.   Musculoskeletal: Positive for arthralgias (left shoulder). Negative for joint swelling and myalgias.   Skin: Negative for rash.   Neurological: Negative for dizziness, weakness, headaches and paresthesias.   Psychiatric/Behavioral: Negative for mood changes. The patient is not nervous/anxious.             Objective    BP (!) 150/76 (BP Location: Left arm, Cuff Size: Adult Large)   Pulse 94   Temp 98.8  F (37.1  C) (Tympanic)   Ht 1.695 m (5' 6.75\")   Wt 82.3 kg (181 lb 6.4 oz)   SpO2 97%   BMI 28.62 kg/m    Body mass index is 28.62 kg/m .  Physical Exam  Constitutional:       General: He is not in acute distress.  HENT:      Head: Normocephalic and atraumatic.   Eyes:      Conjunctiva/sclera: Conjunctivae normal.   Cardiovascular:      Rate and Rhythm: Normal rate and regular rhythm.      Heart sounds: Normal heart sounds. No murmur.   Pulmonary:      Effort: Pulmonary effort is normal. No respiratory distress.      Breath sounds: No wheezing or rales.   Musculoskeletal: Normal range of motion.      Comments: Normal symmetry, good ROM in regards to flexion, external rotation (arms at side), external rotation (arms abducted at 90 deg), and internal rotation, negative Neer impingement sign, positive Chun, negative Apprehension sign     Skin:     Findings: No rash.   Neurological:      Mental Status: He is alert and oriented to person, place, and time.            Assessment & Plan     1. Essential hypertension  Will discontinue amlodipine (can not tolerate because it is a large pill).  Continue with coreg.   - hydrochlorothiazide (HYDRODIURIL) 25 " MG tablet; Take 1 tablet (25 mg) by mouth daily  Dispense: 90 tablet; Refill: 3    2. Chronic left shoulder pain  Concerning rotator cuff pathology.  Patient declines physical therapy.   - XR Shoulder Left 2 Views; Future  - ORTHO  REFERRAL    3. Lipid screening  - Lipid panel reflex to direct LDL Fasting; Future    4. Screening for diabetes mellitus  - Glucose; Future    Health maintenance: Patient was advised to contact his gastroenterologist to schedule his colonoscopy.  He does not wish to have a new referral placed.      See Patient Instructions    No follow-ups on file.    Daniel Rice,   [facility]

## 2019-11-05 NOTE — NURSING NOTE
"Initial There were no vitals taken for this visit. Estimated body mass index is 28.86 kg/m  as calculated from the following:    Height as of 5/8/19: 1.715 m (5' 7.5\").    Weight as of 5/8/19: 84.8 kg (187 lb). .    "

## 2019-11-06 ENCOUNTER — ALLIED HEALTH/NURSE VISIT (OUTPATIENT)
Dept: FAMILY MEDICINE | Facility: CLINIC | Age: 69
End: 2019-11-06
Payer: COMMERCIAL

## 2019-11-06 DIAGNOSIS — Z13.220 LIPID SCREENING: ICD-10-CM

## 2019-11-06 DIAGNOSIS — Z23 NEED FOR VACCINATION: Primary | ICD-10-CM

## 2019-11-06 DIAGNOSIS — Z13.1 SCREENING FOR DIABETES MELLITUS: ICD-10-CM

## 2019-11-06 LAB
CHOLEST SERPL-MCNC: 171 MG/DL
GLUCOSE SERPL-MCNC: 100 MG/DL (ref 70–99)
HDLC SERPL-MCNC: 70 MG/DL
LDLC SERPL CALC-MCNC: 92 MG/DL
NONHDLC SERPL-MCNC: 101 MG/DL
TRIGL SERPL-MCNC: 46 MG/DL

## 2019-11-06 PROCEDURE — 99207 ZZC NO CHARGE NURSE ONLY: CPT

## 2019-11-06 PROCEDURE — 90715 TDAP VACCINE 7 YRS/> IM: CPT

## 2019-11-06 PROCEDURE — 90471 IMMUNIZATION ADMIN: CPT

## 2019-11-06 PROCEDURE — 36415 COLL VENOUS BLD VENIPUNCTURE: CPT | Performed by: FAMILY MEDICINE

## 2019-11-06 PROCEDURE — 80061 LIPID PANEL: CPT | Performed by: FAMILY MEDICINE

## 2019-11-06 PROCEDURE — 82947 ASSAY GLUCOSE BLOOD QUANT: CPT | Performed by: FAMILY MEDICINE

## 2019-11-15 PROBLEM — E73.9 LACTOSE INTOLERANCE: Status: ACTIVE | Noted: 2019-11-15

## 2019-11-22 ENCOUNTER — HOSPITAL ENCOUNTER (EMERGENCY)
Facility: CLINIC | Age: 69
Discharge: HOME | End: 2019-11-23
Attending: EMERGENCY MEDICINE | Admitting: EMERGENCY MEDICINE
Payer: COMMERCIAL

## 2019-11-22 DIAGNOSIS — K21.00 GASTROESOPHAGEAL REFLUX DISEASE WITH ESOPHAGITIS: ICD-10-CM

## 2019-11-22 DIAGNOSIS — R10.84 GENERALIZED ABDOMINAL PAIN: ICD-10-CM

## 2019-11-22 DIAGNOSIS — K29.00 ACUTE GASTRITIS WITHOUT HEMORRHAGE, UNSPECIFIED GASTRITIS TYPE: ICD-10-CM

## 2019-11-22 LAB — LIPASE SERPL-CCNC: 284 U/L (ref 73–393)

## 2019-11-22 PROCEDURE — 85025 COMPLETE CBC W/AUTO DIFF WBC: CPT | Performed by: EMERGENCY MEDICINE

## 2019-11-22 PROCEDURE — 83690 ASSAY OF LIPASE: CPT | Performed by: EMERGENCY MEDICINE

## 2019-11-22 PROCEDURE — 96374 THER/PROPH/DIAG INJ IV PUSH: CPT | Performed by: EMERGENCY MEDICINE

## 2019-11-22 PROCEDURE — 96375 TX/PRO/DX INJ NEW DRUG ADDON: CPT | Performed by: EMERGENCY MEDICINE

## 2019-11-22 PROCEDURE — 25000128 H RX IP 250 OP 636: Performed by: EMERGENCY MEDICINE

## 2019-11-22 PROCEDURE — 80053 COMPREHEN METABOLIC PANEL: CPT | Performed by: EMERGENCY MEDICINE

## 2019-11-22 PROCEDURE — 99284 EMERGENCY DEPT VISIT MOD MDM: CPT | Mod: 25 | Performed by: EMERGENCY MEDICINE

## 2019-11-22 PROCEDURE — 25000132 ZZH RX MED GY IP 250 OP 250 PS 637: Performed by: EMERGENCY MEDICINE

## 2019-11-22 PROCEDURE — 99284 EMERGENCY DEPT VISIT MOD MDM: CPT | Mod: Z6 | Performed by: EMERGENCY MEDICINE

## 2019-11-22 PROCEDURE — 84484 ASSAY OF TROPONIN QUANT: CPT | Performed by: EMERGENCY MEDICINE

## 2019-11-22 PROCEDURE — 25000125 ZZHC RX 250: Performed by: EMERGENCY MEDICINE

## 2019-11-22 RX ORDER — KETOROLAC TROMETHAMINE 15 MG/ML
15 INJECTION, SOLUTION INTRAMUSCULAR; INTRAVENOUS ONCE
Status: COMPLETED | OUTPATIENT
Start: 2019-11-22 | End: 2019-11-22

## 2019-11-22 RX ADMIN — LIDOCAINE HYDROCHLORIDE 30 ML: 20 SOLUTION ORAL; TOPICAL at 23:48

## 2019-11-22 RX ADMIN — FAMOTIDINE 20 MG: 20 INJECTION, SOLUTION INTRAVENOUS at 23:52

## 2019-11-22 RX ADMIN — KETOROLAC TROMETHAMINE 15 MG: 15 INJECTION, SOLUTION INTRAMUSCULAR; INTRAVENOUS at 23:50

## 2019-11-22 NOTE — ED AVS SNAPSHOT
[facility] Emergency Department  [address] [facility] [address]  Phone:  [phone]  Fax:  [phone]                                    Arley Lopez   MRN: [id number]    Department:  [facility] Emergency Department   Date of Visit:  11/22/2019           After Visit Summary Signature Page    I have received my discharge instructions, and my questions have been answered. I have discussed any challenges I see with this plan with the nurse or doctor.    ..........................................................................................................................................  Patient/Patient Representative Signature      ..........................................................................................................................................  Patient Representative Print Name and Relationship to Patient    ..................................................               ................................................  Date                                   Time    ..........................................................................................................................................  Reviewed by Signature/Title    ...................................................              ..............................................  Date                                               Time          22EPIC Rev 08/18

## 2019-11-23 ENCOUNTER — APPOINTMENT (OUTPATIENT)
Dept: ULTRASOUND IMAGING | Facility: CLINIC | Age: 69
End: 2019-11-23
Attending: EMERGENCY MEDICINE
Payer: COMMERCIAL

## 2019-11-23 ENCOUNTER — HOSPITAL ENCOUNTER (OUTPATIENT)
Facility: CLINIC | Age: 69
Setting detail: OBSERVATION
Discharge: HOME | End: 2019-11-25
Attending: EMERGENCY MEDICINE | Admitting: INTERNAL MEDICINE
Payer: COMMERCIAL

## 2019-11-23 ENCOUNTER — APPOINTMENT (OUTPATIENT)
Dept: CT IMAGING | Facility: CLINIC | Age: 69
End: 2019-11-23
Attending: EMERGENCY MEDICINE
Payer: COMMERCIAL

## 2019-11-23 ENCOUNTER — NURSE TRIAGE (OUTPATIENT)
Dept: NURSING | Facility: CLINIC | Age: 69
End: 2019-11-23

## 2019-11-23 VITALS
HEART RATE: 76 BPM | TEMPERATURE: 97.2 F | RESPIRATION RATE: 18 BRPM | SYSTOLIC BLOOD PRESSURE: 154 MMHG | WEIGHT: 180 LBS | OXYGEN SATURATION: 97 % | BODY MASS INDEX: 28.4 KG/M2 | DIASTOLIC BLOOD PRESSURE: 78 MMHG

## 2019-11-23 DIAGNOSIS — G89.18 POSTOPERATIVE PAIN: Primary | ICD-10-CM

## 2019-11-23 DIAGNOSIS — K81.0 ACUTE CHOLECYSTITIS: ICD-10-CM

## 2019-11-23 DIAGNOSIS — R10.11 ABDOMINAL PAIN, RIGHT UPPER QUADRANT: ICD-10-CM

## 2019-11-23 PROBLEM — K80.00 ACUTE CALCULOUS CHOLECYSTITIS: Status: ACTIVE | Noted: 2019-11-23

## 2019-11-23 LAB
ALBUMIN SERPL-MCNC: 3.9 G/DL (ref 3.4–5)
ALBUMIN SERPL-MCNC: 3.9 G/DL (ref 3.4–5)
ALBUMIN UR-MCNC: NEGATIVE MG/DL
ALP SERPL-CCNC: 105 U/L (ref 40–150)
ALP SERPL-CCNC: 110 U/L (ref 40–150)
ALT SERPL W P-5'-P-CCNC: 21 U/L (ref 0–70)
ALT SERPL W P-5'-P-CCNC: 26 U/L (ref 0–70)
ANION GAP SERPL CALCULATED.3IONS-SCNC: 6 MMOL/L (ref 3–14)
ANION GAP SERPL CALCULATED.3IONS-SCNC: 7 MMOL/L (ref 3–14)
APPEARANCE UR: CLEAR
AST SERPL W P-5'-P-CCNC: 16 U/L (ref 0–45)
AST SERPL W P-5'-P-CCNC: 43 U/L (ref 0–45)
BASOPHILS # BLD AUTO: 0.1 10E9/L (ref 0–0.2)
BASOPHILS # BLD AUTO: 0.1 10E9/L (ref 0–0.2)
BASOPHILS NFR BLD AUTO: 0.3 %
BASOPHILS NFR BLD AUTO: 0.4 %
BILIRUB SERPL-MCNC: 0.6 MG/DL (ref 0.2–1.3)
BILIRUB SERPL-MCNC: 0.8 MG/DL (ref 0.2–1.3)
BILIRUB UR QL STRIP: NEGATIVE
BUN SERPL-MCNC: 22 MG/DL (ref 7–30)
BUN SERPL-MCNC: 23 MG/DL (ref 7–30)
CALCIUM SERPL-MCNC: 9 MG/DL (ref 8.5–10.1)
CALCIUM SERPL-MCNC: 9.2 MG/DL (ref 8.5–10.1)
CHLORIDE SERPL-SCNC: 102 MMOL/L (ref 94–109)
CHLORIDE SERPL-SCNC: 104 MMOL/L (ref 94–109)
CO2 SERPL-SCNC: 26 MMOL/L (ref 20–32)
CO2 SERPL-SCNC: 27 MMOL/L (ref 20–32)
COLOR UR AUTO: YELLOW
CREAT SERPL-MCNC: 0.96 MG/DL (ref 0.66–1.25)
CREAT SERPL-MCNC: 1.13 MG/DL (ref 0.66–1.25)
DIFFERENTIAL METHOD BLD: ABNORMAL
DIFFERENTIAL METHOD BLD: ABNORMAL
EOSINOPHIL # BLD AUTO: 0 10E9/L (ref 0–0.7)
EOSINOPHIL # BLD AUTO: 0 10E9/L (ref 0–0.7)
EOSINOPHIL NFR BLD AUTO: 0 %
EOSINOPHIL NFR BLD AUTO: 0.3 %
ERYTHROCYTE [DISTWIDTH] IN BLOOD BY AUTOMATED COUNT: 12.4 % (ref 10–15)
ERYTHROCYTE [DISTWIDTH] IN BLOOD BY AUTOMATED COUNT: 12.7 % (ref 10–15)
GFR SERPL CREATININE-BSD FRML MDRD: 67 ML/MIN/{1.73_M2}
GFR SERPL CREATININE-BSD FRML MDRD: 81 ML/MIN/{1.73_M2}
GLUCOSE SERPL-MCNC: 115 MG/DL (ref 70–99)
GLUCOSE SERPL-MCNC: 120 MG/DL (ref 70–99)
GLUCOSE UR STRIP-MCNC: NEGATIVE MG/DL
HCT VFR BLD AUTO: 43.8 % (ref 40–53)
HCT VFR BLD AUTO: 44 % (ref 40–53)
HGB BLD-MCNC: 14.9 G/DL (ref 13.3–17.7)
HGB BLD-MCNC: 15.1 G/DL (ref 13.3–17.7)
HGB UR QL STRIP: ABNORMAL
IMM GRANULOCYTES # BLD: 0.1 10E9/L (ref 0–0.4)
IMM GRANULOCYTES # BLD: 0.2 10E9/L (ref 0–0.4)
IMM GRANULOCYTES NFR BLD: 0.7 %
IMM GRANULOCYTES NFR BLD: 0.9 %
KETONES UR STRIP-MCNC: 20 MG/DL
LACTATE BLD-SCNC: 1.1 MMOL/L (ref 0.7–2)
LEUKOCYTE ESTERASE UR QL STRIP: NEGATIVE
LIPASE SERPL-CCNC: 61 U/L (ref 73–393)
LYMPHOCYTES # BLD AUTO: 1.3 10E9/L (ref 0.8–5.3)
LYMPHOCYTES # BLD AUTO: 1.5 10E9/L (ref 0.8–5.3)
LYMPHOCYTES NFR BLD AUTO: 5.2 %
LYMPHOCYTES NFR BLD AUTO: 9.5 %
MCH RBC QN AUTO: 28.9 PG (ref 26.5–33)
MCH RBC QN AUTO: 29.6 PG (ref 26.5–33)
MCHC RBC AUTO-ENTMCNC: 33.9 G/DL (ref 31.5–36.5)
MCHC RBC AUTO-ENTMCNC: 34.5 G/DL (ref 31.5–36.5)
MCV RBC AUTO: 85 FL (ref 78–100)
MCV RBC AUTO: 86 FL (ref 78–100)
MONOCYTES # BLD AUTO: 1.2 10E9/L (ref 0–1.3)
MONOCYTES # BLD AUTO: 2.6 10E9/L (ref 0–1.3)
MONOCYTES NFR BLD AUTO: 10.7 %
MONOCYTES NFR BLD AUTO: 7.7 %
MUCOUS THREADS #/AREA URNS LPF: PRESENT /LPF
NEUTROPHILS # BLD AUTO: 12.7 10E9/L (ref 1.6–8.3)
NEUTROPHILS # BLD AUTO: 20.4 10E9/L (ref 1.6–8.3)
NEUTROPHILS NFR BLD AUTO: 81.2 %
NEUTROPHILS NFR BLD AUTO: 83.1 %
NITRATE UR QL: NEGATIVE
NRBC # BLD AUTO: 0 10*3/UL
NRBC # BLD AUTO: 0 10*3/UL
NRBC BLD AUTO-RTO: 0 /100
NRBC BLD AUTO-RTO: 0 /100
PH UR STRIP: 5 PH (ref 5–7)
PLATELET # BLD AUTO: 234 10E9/L (ref 150–450)
PLATELET # BLD AUTO: 250 10E9/L (ref 150–450)
POTASSIUM SERPL-SCNC: 3.4 MMOL/L (ref 3.4–5.3)
POTASSIUM SERPL-SCNC: 4.9 MMOL/L (ref 3.4–5.3)
PROT SERPL-MCNC: 8.1 G/DL (ref 6.8–8.8)
PROT SERPL-MCNC: 8.1 G/DL (ref 6.8–8.8)
RBC # BLD AUTO: 5.1 10E12/L (ref 4.4–5.9)
RBC # BLD AUTO: 5.15 10E12/L (ref 4.4–5.9)
RBC #/AREA URNS AUTO: 1 /HPF (ref 0–2)
SODIUM SERPL-SCNC: 136 MMOL/L (ref 133–144)
SODIUM SERPL-SCNC: 136 MMOL/L (ref 133–144)
SOURCE: ABNORMAL
SP GR UR STRIP: 1.02 (ref 1–1.03)
TROPONIN I SERPL-MCNC: <0.015 UG/L (ref 0–0.04)
UROBILINOGEN UR STRIP-MCNC: 0 MG/DL (ref 0–2)
WBC # BLD AUTO: 15.6 10E9/L (ref 4–11)
WBC # BLD AUTO: 24.6 10E9/L (ref 4–11)
WBC #/AREA URNS AUTO: 1 /HPF (ref 0–5)

## 2019-11-23 PROCEDURE — 99214 OFFICE O/P EST MOD 30 MIN: CPT | Mod: 57 | Performed by: SURGERY

## 2019-11-23 PROCEDURE — 25000128 H RX IP 250 OP 636: Performed by: SURGERY

## 2019-11-23 PROCEDURE — 96376 TX/PRO/DX INJ SAME DRUG ADON: CPT | Performed by: EMERGENCY MEDICINE

## 2019-11-23 PROCEDURE — G0378 HOSPITAL OBSERVATION PER HR: HCPCS

## 2019-11-23 PROCEDURE — 83605 ASSAY OF LACTIC ACID: CPT | Performed by: EMERGENCY MEDICINE

## 2019-11-23 PROCEDURE — 96361 HYDRATE IV INFUSION ADD-ON: CPT | Performed by: EMERGENCY MEDICINE

## 2019-11-23 PROCEDURE — 99285 EMERGENCY DEPT VISIT HI MDM: CPT | Mod: 25 | Performed by: EMERGENCY MEDICINE

## 2019-11-23 PROCEDURE — 25800030 ZZH RX IP 258 OP 636: Performed by: EMERGENCY MEDICINE

## 2019-11-23 PROCEDURE — 76705 ECHO EXAM OF ABDOMEN: CPT

## 2019-11-23 PROCEDURE — 96375 TX/PRO/DX INJ NEW DRUG ADDON: CPT | Performed by: EMERGENCY MEDICINE

## 2019-11-23 PROCEDURE — 99285 EMERGENCY DEPT VISIT HI MDM: CPT | Mod: Z6 | Performed by: EMERGENCY MEDICINE

## 2019-11-23 PROCEDURE — 96374 THER/PROPH/DIAG INJ IV PUSH: CPT

## 2019-11-23 PROCEDURE — 96365 THER/PROPH/DIAG IV INF INIT: CPT

## 2019-11-23 PROCEDURE — 25000128 H RX IP 250 OP 636: Performed by: EMERGENCY MEDICINE

## 2019-11-23 PROCEDURE — 85025 COMPLETE CBC W/AUTO DIFF WBC: CPT | Performed by: EMERGENCY MEDICINE

## 2019-11-23 PROCEDURE — 96375 TX/PRO/DX INJ NEW DRUG ADDON: CPT

## 2019-11-23 PROCEDURE — 96361 HYDRATE IV INFUSION ADD-ON: CPT

## 2019-11-23 PROCEDURE — 74177 CT ABD & PELVIS W/CONTRAST: CPT

## 2019-11-23 PROCEDURE — 80053 COMPREHEN METABOLIC PANEL: CPT | Performed by: EMERGENCY MEDICINE

## 2019-11-23 PROCEDURE — 96374 THER/PROPH/DIAG INJ IV PUSH: CPT | Mod: 59 | Performed by: EMERGENCY MEDICINE

## 2019-11-23 PROCEDURE — 83690 ASSAY OF LIPASE: CPT | Performed by: EMERGENCY MEDICINE

## 2019-11-23 PROCEDURE — 81001 URINALYSIS AUTO W/SCOPE: CPT | Performed by: EMERGENCY MEDICINE

## 2019-11-23 PROCEDURE — 25000132 ZZH RX MED GY IP 250 OP 250 PS 637: Performed by: SURGERY

## 2019-11-23 PROCEDURE — 25000125 ZZHC RX 250: Performed by: EMERGENCY MEDICINE

## 2019-11-23 RX ORDER — CEFOXITIN SODIUM 2 G/50ML
2 INJECTION, SOLUTION INTRAVENOUS EVERY 6 HOURS
Status: DISCONTINUED | OUTPATIENT
Start: 2019-11-24 | End: 2019-11-25 | Stop reason: HOSPADM

## 2019-11-23 RX ORDER — ONDANSETRON 4 MG/1
4 TABLET, ORALLY DISINTEGRATING ORAL EVERY 6 HOURS PRN
Status: DISCONTINUED | OUTPATIENT
Start: 2019-11-23 | End: 2019-11-23

## 2019-11-23 RX ORDER — ONDANSETRON 2 MG/ML
4 INJECTION INTRAMUSCULAR; INTRAVENOUS EVERY 30 MIN PRN
Status: DISCONTINUED | OUTPATIENT
Start: 2019-11-23 | End: 2019-11-23

## 2019-11-23 RX ORDER — CIPROFLOXACIN 2 MG/ML
400 INJECTION, SOLUTION INTRAVENOUS EVERY 12 HOURS
Status: DISCONTINUED | OUTPATIENT
Start: 2019-11-23 | End: 2019-11-23

## 2019-11-23 RX ORDER — ONDANSETRON 2 MG/ML
4 INJECTION INTRAMUSCULAR; INTRAVENOUS EVERY 6 HOURS PRN
Status: DISCONTINUED | OUTPATIENT
Start: 2019-11-23 | End: 2019-11-23

## 2019-11-23 RX ORDER — ENALAPRILAT 1.25 MG/ML
1.25 INJECTION INTRAVENOUS EVERY 6 HOURS
Status: DISCONTINUED | OUTPATIENT
Start: 2019-11-23 | End: 2019-11-25 | Stop reason: HOSPADM

## 2019-11-23 RX ORDER — ZOLPIDEM TARTRATE 5 MG/1
5 TABLET ORAL AT BEDTIME
Status: DISCONTINUED | OUTPATIENT
Start: 2019-11-23 | End: 2019-11-25 | Stop reason: HOSPADM

## 2019-11-23 RX ORDER — CIPROFLOXACIN 2 MG/ML
400 INJECTION, SOLUTION INTRAVENOUS ONCE
Status: COMPLETED | OUTPATIENT
Start: 2019-11-24 | End: 2019-11-24

## 2019-11-23 RX ORDER — ZOLPIDEM TARTRATE 5 MG/1
5 TABLET ORAL AT BEDTIME
Status: DISCONTINUED | OUTPATIENT
Start: 2019-11-23 | End: 2019-11-23

## 2019-11-23 RX ORDER — IOPAMIDOL 755 MG/ML
89 INJECTION, SOLUTION INTRAVASCULAR ONCE
Status: COMPLETED | OUTPATIENT
Start: 2019-11-23 | End: 2019-11-23

## 2019-11-23 RX ORDER — METOCLOPRAMIDE HYDROCHLORIDE 5 MG/ML
10 INJECTION INTRAMUSCULAR; INTRAVENOUS EVERY 6 HOURS
Status: DISCONTINUED | OUTPATIENT
Start: 2019-11-23 | End: 2019-11-25 | Stop reason: HOSPADM

## 2019-11-23 RX ORDER — LIDOCAINE HYDROCHLORIDE 20 MG/ML
10 SOLUTION OROPHARYNGEAL
Qty: 100 ML | Refills: 1 | Status: SHIPPED | OUTPATIENT
Start: 2019-11-23 | End: 2019-12-06

## 2019-11-23 RX ORDER — ONDANSETRON 2 MG/ML
4 INJECTION INTRAMUSCULAR; INTRAVENOUS EVERY 6 HOURS PRN
Status: DISCONTINUED | OUTPATIENT
Start: 2019-11-23 | End: 2019-11-25 | Stop reason: HOSPADM

## 2019-11-23 RX ORDER — SODIUM CHLORIDE, SODIUM LACTATE, POTASSIUM CHLORIDE, CALCIUM CHLORIDE 600; 310; 30; 20 MG/100ML; MG/100ML; MG/100ML; MG/100ML
INJECTION, SOLUTION INTRAVENOUS CONTINUOUS
Status: DISCONTINUED | OUTPATIENT
Start: 2019-11-23 | End: 2019-11-24

## 2019-11-23 RX ORDER — CEFOTETAN DISODIUM 2 G/20ML
2 INJECTION, POWDER, FOR SOLUTION INTRAMUSCULAR; INTRAVENOUS EVERY 12 HOURS
Status: DISCONTINUED | OUTPATIENT
Start: 2019-11-23 | End: 2019-11-23

## 2019-11-23 RX ORDER — NALOXONE HYDROCHLORIDE 0.4 MG/ML
.1-.4 INJECTION, SOLUTION INTRAMUSCULAR; INTRAVENOUS; SUBCUTANEOUS
Status: DISCONTINUED | OUTPATIENT
Start: 2019-11-23 | End: 2019-11-25 | Stop reason: HOSPADM

## 2019-11-23 RX ORDER — ONDANSETRON 4 MG/1
4 TABLET, ORALLY DISINTEGRATING ORAL EVERY 6 HOURS PRN
Status: DISCONTINUED | OUTPATIENT
Start: 2019-11-23 | End: 2019-11-25 | Stop reason: HOSPADM

## 2019-11-23 RX ORDER — HYDROMORPHONE HYDROCHLORIDE 1 MG/ML
.5-1 INJECTION, SOLUTION INTRAMUSCULAR; INTRAVENOUS; SUBCUTANEOUS EVERY 4 HOURS PRN
Status: DISCONTINUED | OUTPATIENT
Start: 2019-11-23 | End: 2019-11-25 | Stop reason: HOSPADM

## 2019-11-23 RX ORDER — SODIUM CHLORIDE, SODIUM LACTATE, POTASSIUM CHLORIDE, CALCIUM CHLORIDE 600; 310; 30; 20 MG/100ML; MG/100ML; MG/100ML; MG/100ML
INJECTION, SOLUTION INTRAVENOUS CONTINUOUS
Status: DISCONTINUED | OUTPATIENT
Start: 2019-11-23 | End: 2019-11-23

## 2019-11-23 RX ORDER — HYDROMORPHONE HYDROCHLORIDE 1 MG/ML
0.5 INJECTION, SOLUTION INTRAMUSCULAR; INTRAVENOUS; SUBCUTANEOUS EVERY 30 MIN PRN
Status: DISCONTINUED | OUTPATIENT
Start: 2019-11-23 | End: 2019-11-23

## 2019-11-23 RX ADMIN — ONDANSETRON 4 MG: 2 INJECTION INTRAMUSCULAR; INTRAVENOUS at 17:59

## 2019-11-23 RX ADMIN — IOPAMIDOL 89 ML: 755 INJECTION, SOLUTION INTRAVENOUS at 21:17

## 2019-11-23 RX ADMIN — SODIUM CHLORIDE 62 ML: 9 INJECTION, SOLUTION INTRAVENOUS at 21:18

## 2019-11-23 RX ADMIN — HYDROMORPHONE HYDROCHLORIDE 0.5 MG: 1 INJECTION, SOLUTION INTRAMUSCULAR; INTRAVENOUS; SUBCUTANEOUS at 20:51

## 2019-11-23 RX ADMIN — METRONIDAZOLE 500 MG: 500 INJECTION, SOLUTION INTRAVENOUS at 22:15

## 2019-11-23 RX ADMIN — ZOLPIDEM TARTRATE 5 MG: 5 TABLET, FILM COATED ORAL at 23:38

## 2019-11-23 RX ADMIN — SODIUM CHLORIDE, POTASSIUM CHLORIDE, SODIUM LACTATE AND CALCIUM CHLORIDE: 600; 310; 30; 20 INJECTION, SOLUTION INTRAVENOUS at 20:51

## 2019-11-23 RX ADMIN — METOCLOPRAMIDE 10 MG: 5 INJECTION, SOLUTION INTRAMUSCULAR; INTRAVENOUS at 23:38

## 2019-11-23 RX ADMIN — HYDROMORPHONE HYDROCHLORIDE 0.5 MG: 1 INJECTION, SOLUTION INTRAMUSCULAR; INTRAVENOUS; SUBCUTANEOUS at 18:49

## 2019-11-23 RX ADMIN — SODIUM CHLORIDE 1000 ML: 9 INJECTION, SOLUTION INTRAVENOUS at 17:58

## 2019-11-23 ASSESSMENT — ENCOUNTER SYMPTOMS
ABDOMINAL PAIN: 1
RESPIRATORY NEGATIVE: 1
NAUSEA: 1
CONSTITUTIONAL NEGATIVE: 1
ABDOMINAL PAIN: 1
EYES NEGATIVE: 1
ENDOCRINE NEGATIVE: 1
CARDIOVASCULAR NEGATIVE: 1
ALLERGIC/IMMUNOLOGIC NEGATIVE: 1
FEVER: 0
HEMATOLOGIC/LYMPHATIC NEGATIVE: 1
PSYCHIATRIC NEGATIVE: 1
SHORTNESS OF BREATH: 0
MUSCULOSKELETAL NEGATIVE: 1
NEUROLOGICAL NEGATIVE: 1

## 2019-11-23 ASSESSMENT — MIFFLIN-ST. JEOR
SCORE: 1540.63
SCORE: 1554.64

## 2019-11-23 NOTE — ED PROVIDER NOTES
"  History     Chief Complaint   Patient presents with     Abdominal Pain     abd pain and weak, not eating or drinking, \"bloated\", \"freezing\"  here last night for abd pain     HPI  Arley Lopez is a 69 year old male with a history of GERD, and hypertension who presents to the emergency department today for evaluation of abdominal pain. Patient was seen in the ED last night and given a GI cocktail that he has had in the past. Patient reports that in the past the GI cocktail has worked but last night it didn't work. He complains of feeling bloated and upper abdominal pain. He reports that he can not eat or drink. He denies any fevers, diarrhea, or constipation. He notes he had two hard bowel movements today. Patient reports that he saw Dr. Puga in May for gall stones but was told not to do anything until it becomes exacerbated again. Patient has had a colonoscopy in the past. Patient is allergic to penicillins. His daily medications include omeprazole, and hydrochlorothiazide.     Social History: The patient lives in [place], [place]. He presents to the ED via personal car with his wife.    Allergies:  Allergies   Allergen Reactions     Penicillin G Hives and Swelling       Problem List:    Patient Active Problem List    Diagnosis Date Noted     Lactose intolerance 11/15/2019     Priority: Medium     Irritable bowel syndrome with diarrhea 12/07/2015     Priority: Medium     CARDIOVASCULAR SCREENING; LDL GOAL LESS THAN 130 02/27/2013     Priority: Medium     GERD (gastroesophageal reflux disease) 07/12/2012     Priority: Medium     Essential hypertension 07/12/2012     Priority: Medium        Past Medical History:    Past Medical History:   Diagnosis Date     Essential hypertension      GERD (gastroesophageal reflux disease)      Kidney problem        Past Surgical History:    Past Surgical History:   Procedure Laterality Date     COLONOSCOPY  May 2012    abnormal !     ENDOSCOPY  01/10/2011    Reflux duodenum, Hiatal " "hernia small, otherwise normal exam.     FOOT SURGERY Left      GENITOURINARY SURGERY      vasectomy     GENITOURINARY SURGERY      spermacele       Family History:    Family History   Problem Relation Age of Onset     Cancer Mother      Rheumatic fever Mother      Suicide Father        Social History:  Marital Status:   [2]  Social History     Tobacco Use     Smoking status: Former Smoker     Packs/day: 2.00     Years: 2.00     Pack years: 4.00     Last attempt to quit: 1992     Years since quittin.9     Smokeless tobacco: Never Used   Substance Use Topics     Alcohol use: Yes     Comment: beer per monthly     Drug use: No        Medications:    carvedilol (COREG) 6.25 MG tablet  hydrochlorothiazide (HYDRODIURIL) 25 MG tablet  lidocaine (XYLOCAINE) 2 % solution  omeprazole (PRILOSEC) 40 MG DR capsule          Review of Systems   Constitutional: Negative.    HENT: Negative.    Eyes: Negative.    Respiratory: Negative.    Cardiovascular: Negative.    Gastrointestinal: Positive for abdominal pain and nausea.   Endocrine: Negative.    Genitourinary: Negative.    Musculoskeletal: Negative.    Skin: Negative.    Allergic/Immunologic: Negative.    Neurological: Negative.    Hematological: Negative.    Psychiatric/Behavioral: Negative.    All other systems reviewed and are negative.      Physical Exam   BP: (!) 154/83  Pulse: 104  Temp: 98  F (36.7  C)  Height: 170.2 cm (5' 7\")  Weight: 82.1 kg (181 lb)  SpO2: 95 %      Physical Exam  Constitutional:       General: He is not in acute distress.     Appearance: He is not ill-appearing or diaphoretic.   HENT:      Head: Normocephalic and atraumatic.      Mouth/Throat:      Pharynx: No pharyngeal swelling or oropharyngeal exudate.   Eyes:      Extraocular Movements: Extraocular movements intact.      Pupils: Pupils are equal, round, and reactive to light.   Cardiovascular:      Rate and Rhythm: Regular rhythm. Tachycardia present.      Heart sounds: No murmur. " No gallop.    Pulmonary:      Effort: Pulmonary effort is normal. No respiratory distress.      Breath sounds: Normal breath sounds. No stridor. No wheezing, rhonchi or rales.   Chest:      Chest wall: No tenderness.   Abdominal:      General: Bowel sounds are decreased. There is distension. There is no abdominal bruit. There are no signs of injury.      Palpations: Abdomen is soft. There is no splenomegaly or mass.      Tenderness: There is generalized abdominal tenderness and tenderness in the right upper quadrant. There is guarding. There is no rebound.      Hernia: There is no hernia in the umbilical area, ventral area, right inguinal area, left inguinal area, right femoral area or left femoral area.       Genitourinary:     Rectum: Guaiac result negative. No tenderness.   Skin:     Capillary Refill: Capillary refill takes less than 2 seconds.      Coloration: Skin is not cyanotic, jaundiced, mottled or pale.      Findings: No erythema or rash.   Neurological:      General: No focal deficit present.      Mental Status: He is alert.      Cranial Nerves: No cranial nerve deficit.      Motor: No weakness.   Psychiatric:         Mood and Affect: Mood normal. Mood is not anxious or depressed.         Behavior: Behavior normal.         ED Course        Procedures               Critical Care time:  none          ED Medications:  Medications   ondansetron (ZOFRAN) injection 4 mg (4 mg Intravenous Given 11/23/19 1759)   HYDROmorphone (PF) (DILAUDID) injection 0.5 mg (0.5 mg Intravenous Given 11/23/19 2051)   lactated ringers infusion ( Intravenous Paused 11/23/19 2211)   ciprofloxacin (CIPRO) infusion 400 mg (has no administration in time range)   metroNIDAZOLE (FLAGYL) infusion 500 mg (500 mg Intravenous New Bag 11/23/19 2215)   0.9% sodium chloride BOLUS (0 mLs Intravenous Stopped 11/23/19 1851)   iopamidol (ISOVUE-370) solution 89 mL (89 mLs Intravenous Given 11/23/19 2117)   sodium chloride 0.9 % bag 500mL for CT  scan flush use (62 mLs Intravenous Given 11/23/19 2118)       ED Vitals:  Vitals:    11/23/19 2030 11/23/19 2045 11/23/19 2100 11/23/19 2115   BP: (!) 142/75 (!) 148/78 128/76    Pulse: 96 98 92    Temp:       TempSrc:       SpO2: 96% 98% 94% 94%   Weight:       Height:           ED Labs and Imaging:  Results for orders placed or performed during the hospital encounter of 11/23/19 (from the past 24 hour(s))   CBC with platelets differential   Result Value Ref Range    WBC 24.6 (H) 4.0 - 11.0 10e9/L    RBC Count 5.10 4.4 - 5.9 10e12/L    Hemoglobin 15.1 13.3 - 17.7 g/dL    Hematocrit 43.8 40.0 - 53.0 %    MCV 86 78 - 100 fl    MCH 29.6 26.5 - 33.0 pg    MCHC 34.5 31.5 - 36.5 g/dL    RDW 12.7 10.0 - 15.0 %    Platelet Count 234 150 - 450 10e9/L    Diff Method Automated Method     % Neutrophils 83.1 %    % Lymphocytes 5.2 %    % Monocytes 10.7 %    % Eosinophils 0.0 %    % Basophils 0.3 %    % Immature Granulocytes 0.7 %    Nucleated RBCs 0 0 /100    Absolute Neutrophil 20.4 (H) 1.6 - 8.3 10e9/L    Absolute Lymphocytes 1.3 0.8 - 5.3 10e9/L    Absolute Monocytes 2.6 (H) 0.0 - 1.3 10e9/L    Absolute Eosinophils 0.0 0.0 - 0.7 10e9/L    Absolute Basophils 0.1 0.0 - 0.2 10e9/L    Abs Immature Granulocytes 0.2 0 - 0.4 10e9/L    Absolute Nucleated RBC 0.0    Comprehensive metabolic panel   Result Value Ref Range    Sodium 136 133 - 144 mmol/L    Potassium 3.4 3.4 - 5.3 mmol/L    Chloride 102 94 - 109 mmol/L    Carbon Dioxide 27 20 - 32 mmol/L    Anion Gap 7 3 - 14 mmol/L    Glucose 115 (H) 70 - 99 mg/dL    Urea Nitrogen 22 7 - 30 mg/dL    Creatinine 1.13 0.66 - 1.25 mg/dL    GFR Estimate 67 >60 mL/min/[1.73_m2]    GFR Estimate If Black 77 >60 mL/min/[1.73_m2]    Calcium 9.0 8.5 - 10.1 mg/dL    Bilirubin Total 0.8 0.2 - 1.3 mg/dL    Albumin 3.9 3.4 - 5.0 g/dL    Protein Total 8.1 6.8 - 8.8 g/dL    Alkaline Phosphatase 110 40 - 150 U/L    ALT 21 0 - 70 U/L    AST 16 0 - 45 U/L   Lactic acid whole blood   Result Value Ref Range     Lactic Acid 1.1 0.7 - 2.0 mmol/L   Lipase   Result Value Ref Range    Lipase 61 (L) 73 - 393 U/L   Abdomen US, limited (RUQ only)    Narrative    US ABDOMEN LIMITED   11/23/2019 7:19 PM     HISTORY:  Epigastric abdominal pain, known history of cholelithiasis.   Evaluate for acute cholecystitis with CBD stone.  Compare with  ultrasound from May 2019.    COMPARISON: Abdominal ultrasound on 5/6/2019.    FINDINGS:    Gallbladder: Shadowing gallstones near the gallbladder neck with  gallbladder sludge. No gallbladder wall thickening or pericholecystic  fluid. Sonographic Jones's sign is positive.    Bile ducts:  CHD is normal diameter.  No intrahepatic biliary  dilatation. The distal portion of the common bile duct is not clearly  visualized due to overlying bowel gas.    Liver: The liver demonstrates normal parenchymal echogenicity. No  suspicious focal hepatic mass.    Pancreas:  Partially obscured by overlying bowel gas,  but grossly  unremarkable.     Right kidney:  Normal.     Aorta and IVC:  Not specifically assessed.       Impression    IMPRESSION:   Cholelithiasis and gallbladder sludge. No gallbladder  wall thickening or pericholecystic fluid. Sonographic Jones's sign  appears to be positive. Findings appear similar to 5/6/2019 exam and  less likely to represent acute cholecystitis given the lack of  gallbladder wall thickening and pericholecystic fluid. If clinical  suspicion remains high HIDA scan can help further characterization.     DALTON WREN MD   UA reflex to Microscopic   Result Value Ref Range    Color Urine Yellow     Appearance Urine Clear     Glucose Urine Negative NEG^Negative mg/dL    Bilirubin Urine Negative NEG^Negative    Ketones Urine 20 (A) NEG^Negative mg/dL    Specific Gravity Urine 1.016 1.003 - 1.035    Blood Urine Small (A) NEG^Negative    pH Urine 5.0 5.0 - 7.0 pH    Protein Albumin Urine Negative NEG^Negative mg/dL    Urobilinogen mg/dL 0.0 0.0 - 2.0 mg/dL    Nitrite Urine  Negative NEG^Negative    Leukocyte Esterase Urine Negative NEG^Negative    Source Midstream Urine     RBC Urine 1 0 - 2 /HPF    WBC Urine 1 0 - 5 /HPF    Mucous Urine Present (A) NEG^Negative /LPF   CT Abdomen Pelvis w Contrast    Narrative    CT ABDOMEN PELVIS WITH CONTRAST   11/23/2019 9:35 PM     HISTORY: Right-sided abdominal pain, elevated white count, known  gallstones with gallbladder sludge but no convincing evidence of acute  cholecystitis on ultrasound. Evaluate for other acute intra-abdominal  process.    TECHNIQUE:  CT abdomen and pelvis with 89 mL Isovue 370 IV. Radiation  dose for this scan was reduced using automated exposure control,  adjustment of the mA and/or kV according to patient size, or iterative  reconstruction technique.    COMPARISON: Abdominal ultrasound on same day earlier.    FINDINGS:   Lung bases: Minimal bibasilar atelectasis.    Abdomen/pelvis:    The gallbladder is distended with layering gallstones and  pericholecystic fat stranding and small amount of fluid (series 3  image 57 and series 2 image 28). No suspicious focal hepatic lesion.  No splenomegaly. Small splenule in splenic hilum. No adrenal nodules.  No main pancreatic ductal dilatation. Multiple bilateral cortical  scarring, likely related to prior insult. Subcentimeter hypodense  focus at the lower pole of the left kidney (series 2 image 44), to  small to characterize, likely renal cyst. No radiodense kidney stones  or hydronephrosis.    No abnormally dilated bowel loops. No significant free fluid in the  abdomen or pelvis. No free peritoneal or portal venous gas. The  appendix is visualized and appears normal. Moderate predominantly  aortobiiliac atherosclerotic vascular calcification. No significant  abdominopelvic lymphadenopathy. Colonic diverticulosis without CT  evidence of acute diverticulitis.    Bones and soft tissue: No suspicious osseous lesion.      Impression    IMPRESSION:  1. Distended gallbladder, layering  gallstones with mild  pericholecystic fluid and fat stranding, findings highly suspicious  for acute cholecystitis.  2. Colonic diverticulosis without CT evidence of acute diverticulitis.      DALTON WREN MD     Prior or recent diagnostic imaging and work-up:  US ABDOMEN LIMITED 5/6/2019 8:25 AM     HISTORY: Postprandial abdominal pain. Epigastric pain.     TECHNIQUE: Limited abdominal ultrasound to the right upper quadrant is  performed.     COMPARISON: None.     FINDINGS: Gallstones are present in the gallbladder. There is no  gallbladder wall thickening or biliary dilatation. The liver shows no  focal finding. The right kidney is within normal limits. The pancreas  is mostly obscured by overlying bowel gas.                                                                        IMPRESSION:  Cholelithiasis.       ROSIE LOGAN MD    Assessments & Plan (with Medical Decision Making)   Clinical Impression: 69-year-old who presented with ongoing pain and discomfort.  Patient was evaluated in the department 24 hours earlier on November 22, 2019 and discharged home with epigastric pain felt to be related to GERD.  Patient has a known history of gallstones after work-up in May 2019 with ultrasound that showed gallstones.  Patient was seen in the department and treated with GI cocktail Pepcid and Toradol and discharged home with close outpatient follow-up GERD/ gastritis.  White count was 15.6.  Patient returned to the department today reporting that he had a restless night and that his GI cocktail and medications given during his visit last that did not seem to improve his discomfort.  His work-up in the department suggests he has acute cholecystitis and next steps for care were discussed with general surgery with plan for further intervention with admission to the hospital service for IV antibiotics and further care by general surgery.   He reported feeling bloated.  Pain is predominantly in the right mid abdomen  but radiates throughout the entire abdomen to his the pubic ramus.  Patient is currently on carvedilol and hydrochlorothiazide and omeprazole.  On my exam he appeared uncomfortable he was tachycardic at rest but afebrile quiet bowel sounds with a distended abdomen with no rebound or guarding      ED Course and Plan:   I reviewed his medical records including his assessment in the department yesterday on November 22, 2019 and last ultrasound from May 2019 showing cholelithiasis see detail in the report above.  We discussed possible causes for his pain and discomfort including acute cholecystitis and/or choledocholithiasis.  I considered other intra-abdominal catastrophe such as diverticulitis, and appendicitis.  Patient reports no prior history of abdominal surgeries, with prior multiple colonoscopies.  His work-up in the department today shows a white count of 24.6, lactate of 1.1, liver enzymes are within normal range and a total bilirubin of 0.8.  Ultrasound today shows no significant change from ultrasound from May 6, 2019 with cholelithiasis and sludge.  No gallbladder wall thickening or pericholecystic fluid there was some sonographic Jones tenderness.  To ensure no other acute pathology to explain patient's elevated white count with abdominal bloating distention CT imaging of his abdomen with contrast was obtained.  CT imaging with contrast did surprisingly suggest that patient does have acute cholecystitis with a distended gallbladder layering of gallstones and mild pericholecystic fluid and fat stranding. The interpreting radiologist reported hte  study is highly suspicious for acute cholecystitis.  After CT imaging I consulted the general surgeon on duty- Dr Puga who reviewed patient's presentation, ED course and work-up and imaging studies.  See general surgery consult note for additional details.      Plan is to admit to the hospitalist service with IV antibiotics n.p.o. at midnight and plan for further  intervention and evaluation by general surgery.  Patient is allergic to penicillin causing hives and swelling and was covered with perioperative antibiotic prophylaxis with IV ciprofloxacin and metronidazole after review with the on-duty inpatient pharmacist.    I spoke with KEILY Brito PA-C at 10.14pm- who agreed to assume care on admission after reviewing rationale for admission, including consultation with general surgery, and plan of care per discussion with surgery imaging results and interventions in the department.        Disclaimer:This note consists of symbols derived from keyboarding, dictation and/or voice recognition software. As a result, there may be errors in the script that have gone undetected. Please consider this when interpreting information found in this chart.      I have reviewed the nursing notes.    I have reviewed the findings, diagnosis, plan and need for follow up with the patient.       New Prescriptions    No medications on file       Final diagnoses:   Acute cholecystitis - CT showing sludge gallstones and pericholecystic fluid   Abdominal pain, right upper quadrant - Due to acute cholecystitis     This document serves as a record of the services and decisions personally performed and made by Murray Zamora. The HPI was created on his behalf by Hafsa Gatica, a trained medical scribe. The creation of this document is based on the provider's statements to the medical scribe.  Hafsa Gatica 5:49 PM November 23, 2019    Provider:    The information in this document created by the medical scribe for me, accurately reflects the services I personally performed and the decisions made by me. I have reviewed and approved this document for accuracy prior to leaving the patient care area.  Murray Zamora 5:49 PM November 23, 2019 11/23/2019   [facility] EMERGENCY DEPARTMENT     Murray Zamora MD  11/24/19 0033

## 2019-11-23 NOTE — DISCHARGE INSTRUCTIONS
Continue home medications.  You can continue to take Tums, and your antacid drink (Maalox).  You can also try Pepcid or Zantac during episodes of more severe pain.  Carafate or sucralfate can also be tried.    Lidocaine, which can be mixed with Maalox, is the numbing drink medicine which can help as well.    Consider upper endoscopy/camera procedure

## 2019-11-23 NOTE — TELEPHONE ENCOUNTER
"Patient reports \"I was in last night had GI cocktail. It's not working like the first time I had it. I'm out of breathe, tightness in top of chest, stomach is turning. I don't know what to do.\"    Patient was in the ED at [place] yesterday. Advised per discharge instructions, that he should return to the ED if having worsening symptoms.    Caller verbalized understanding and had no further questions.     Caroline Hernandez RN/[facility]    Reason for Disposition    Health Information question, no triage required and triager able to answer question    Protocols used: INFORMATION ONLY CALL-A-AH      "

## 2019-11-23 NOTE — ED PROVIDER NOTES
History     Chief Complaint   Patient presents with     Abdominal Pain     HPI  Arley Lopez is a 69 year old male who has past medical history significant for hypertension, GERD, presenting to the emergency department with epigastric abdominal pain, with also mid abdominal pain that began at approximately 5 PM this afternoon.  Patient states no recent changes in medications.  He is currently on omeprazole, and blood pressure medications.  He has had episodes of gastritis/GERD, or potentially biliary colic related issues.  I saw patient on prior visit in May, 2019, and patient was noted to have gallstones at that time, however no evidence of acute cholecystitis.  I also reviewed further medical records, and follow-up in surgery clinic.  Patient with formal ultrasound performed showing gallstones, with symptoms thought to be secondary to GERD and not gallbladder related.  Patient states that he had toast, peanut butter and jelly sandwich around noon, and his pain worsened around 4:56 PM.  No alleviating factors.  No significant aggravating factors.  No recent fever.  No vomiting.  Normal bowel movements recently.  No history of abdominal surgeries.    Allergies:  Allergies   Allergen Reactions     Penicillin G Hives and Swelling       Problem List:    Patient Active Problem List    Diagnosis Date Noted     Lactose intolerance 11/15/2019     Priority: Medium     CARDIOVASCULAR SCREENING; LDL GOAL LESS THAN 130 02/27/2013     Priority: Medium     GERD (gastroesophageal reflux disease) 07/12/2012     Priority: Medium     Essential hypertension 07/12/2012     Priority: Medium        Past Medical History:    Past Medical History:   Diagnosis Date     Essential hypertension      GERD (gastroesophageal reflux disease)      Kidney problem        Past Surgical History:    Past Surgical History:   Procedure Laterality Date     COLONOSCOPY  May 2012    abnormal !     ENDOSCOPY  01/10/2011    Reflux duodenum, Hiatal hernia  small, otherwise normal exam.     FOOT SURGERY Left      GENITOURINARY SURGERY      vasectomy     GENITOURINARY SURGERY      spermacele       Family History:    Family History   Problem Relation Age of Onset     Cancer Mother      Rheumatic fever Mother      Suicide Father        Social History:  Marital Status:   [2]  Social History     Tobacco Use     Smoking status: Former Smoker     Packs/day: 2.00     Years: 2.00     Pack years: 4.00     Last attempt to quit: 1992     Years since quittin.9     Smokeless tobacco: Never Used   Substance Use Topics     Alcohol use: Yes     Comment: beer per monthly     Drug use: No        Medications:    lidocaine (XYLOCAINE) 2 % solution  carvedilol (COREG) 6.25 MG tablet  hydrochlorothiazide (HYDRODIURIL) 25 MG tablet  omeprazole (PRILOSEC) 40 MG DR capsule          Review of Systems   Constitutional: Negative for fever.   Respiratory: Negative for shortness of breath.    Cardiovascular: Negative for chest pain.   Gastrointestinal: Positive for abdominal pain.   All other systems reviewed and are negative.      Physical Exam   BP: (!) 182/97  Pulse: 77  Temp: 97.2  F (36.2  C)  Resp: 18  Weight: 81.6 kg (180 lb)  SpO2: 97 %      Physical Exam  BP (!) 154/78   Pulse 76   Temp 97.2  F (36.2  C) (Oral)   Resp 18   Wt 81.6 kg (180 lb)   SpO2 97%   BMI 28.40 kg/m    General: alert, interactive, in no apparent distress  Head: atraumatic  Nose: no rhinorrhea or epistaxis  Ears: no external auditory canal discharge or bleeding.    Eyes: Sclera nonicteric. Conjunctiva noninjected. PERRL, EOMI  Mouth: no tonsillar erythema, edema, or exudate  Neck: supple, no palp LAD  Lungs: CTAB  CV: RRR, S1/S2; peripheral pulses palpable and symmetric  Abdomen: soft, nt, nd, no guarding or rebound. Positive bowel sounds  Extremities: no cyanosis or edema  Skin: no rash or diaphoresis  Neuro:  strength 5/5 in UE and LEs bilaterally, sensation intact to light touch in UE and LEs  bilaterally;       ED Course        Procedures               Critical Care time:  none               Results for orders placed or performed during the hospital encounter of 11/22/19 (from the past 24 hour(s))   CBC with platelets differential   Result Value Ref Range    WBC 15.6 (H) 4.0 - 11.0 10e9/L    RBC Count 5.15 4.4 - 5.9 10e12/L    Hemoglobin 14.9 13.3 - 17.7 g/dL    Hematocrit 44.0 40.0 - 53.0 %    MCV 85 78 - 100 fl    MCH 28.9 26.5 - 33.0 pg    MCHC 33.9 31.5 - 36.5 g/dL    RDW 12.4 10.0 - 15.0 %    Platelet Count 250 150 - 450 10e9/L    Diff Method Automated Method     % Neutrophils 81.2 %    % Lymphocytes 9.5 %    % Monocytes 7.7 %    % Eosinophils 0.3 %    % Basophils 0.4 %    % Immature Granulocytes 0.9 %    Nucleated RBCs 0 0 /100    Absolute Neutrophil 12.7 (H) 1.6 - 8.3 10e9/L    Absolute Lymphocytes 1.5 0.8 - 5.3 10e9/L    Absolute Monocytes 1.2 0.0 - 1.3 10e9/L    Absolute Eosinophils 0.0 0.0 - 0.7 10e9/L    Absolute Basophils 0.1 0.0 - 0.2 10e9/L    Abs Immature Granulocytes 0.1 0 - 0.4 10e9/L    Absolute Nucleated RBC 0.0    Comprehensive metabolic panel   Result Value Ref Range    Sodium 136 133 - 144 mmol/L    Potassium 4.9 3.4 - 5.3 mmol/L    Chloride 104 94 - 109 mmol/L    Carbon Dioxide 26 20 - 32 mmol/L    Anion Gap 6 3 - 14 mmol/L    Glucose 120 (H) 70 - 99 mg/dL    Urea Nitrogen 23 7 - 30 mg/dL    Creatinine 0.96 0.66 - 1.25 mg/dL    GFR Estimate 81 >60 mL/min/[1.73_m2]    GFR Estimate If Black >90 >60 mL/min/[1.73_m2]    Calcium 9.2 8.5 - 10.1 mg/dL    Bilirubin Total 0.6 0.2 - 1.3 mg/dL    Albumin 3.9 3.4 - 5.0 g/dL    Protein Total 8.1 6.8 - 8.8 g/dL    Alkaline Phosphatase 105 40 - 150 U/L    ALT 26 0 - 70 U/L    AST 43 0 - 45 U/L   Lipase   Result Value Ref Range    Lipase 284 73 - 393 U/L   Troponin I   Result Value Ref Range    Troponin I ES <0.015 0.000 - 0.045 ug/L       Medications   famotidine (PEPCID) infusion 20 mg (0 mg Intravenous Stopped 11/23/19 0007)   ketorolac  (TORADOL) injection 15 mg (15 mg Intravenous Given 11/22/19 9888)   lidocaine (XYLOCAINE) 2 % 15 mL, alum & mag hydroxide-simethicone (MYLANTA ES/MAALOX  ES) 15 mL GI Cocktail (30 mLs Oral Given 11/22/19 9960)       Assessments & Plan (with Medical Decision Making)  69 year old male, with history of GERD, and hypertension, presenting to the emergency department with concerns regarding epigastric abdominal pain that began about 7 hours prior to emergency department arrival.  No fever.  No vomiting.  Has had prior work-up where gallstones have been noted.  Patient is tender in the epigastric region, however is not significantly tender over the right upper quadrant.  He does have some mid abdominal discomfort.  IV established, and labs drawn.  Patient with slightly elevated white blood cell count at 15.6.  CMP returned normal, with normal electrolytes.  Normal LFTs, normal bilirubin.  Lipase is also normal.  I feel that symptoms are more consistent with gastritis/GERD and less likely biliary disease.  Patient was given Toradol, GI cocktail, and Pepcid.  Repeat assessment shows pain improved.  Patient encouraged to follow-up in clinic as needed.  [place] diet recommended.  Return if severe worsening of symptoms.     I have reviewed the nursing notes.    I have reviewed the findings, diagnosis, plan and need for follow up with the patient.       New Prescriptions    LIDOCAINE (XYLOCAINE) 2 % SOLUTION    Swish and swallow 10 mLs in mouth every 3 hours as needed for moderate pain ; Max 8 doses/24 hour period.       Final diagnoses:   Generalized abdominal pain   Gastroesophageal reflux disease with esophagitis   Acute gastritis without hemorrhage, unspecified gastritis type       11/22/2019   [facility] EMERGENCY DEPARTMENT     Kevin Cortes MD  11/23/19 0111

## 2019-11-24 ENCOUNTER — ANESTHESIA EVENT (OUTPATIENT)
Dept: SURGERY | Facility: CLINIC | Age: 69
End: 2019-11-24
Payer: COMMERCIAL

## 2019-11-24 ENCOUNTER — ANESTHESIA (OUTPATIENT)
Dept: SURGERY | Facility: CLINIC | Age: 69
End: 2019-11-24
Payer: COMMERCIAL

## 2019-11-24 LAB
ALBUMIN SERPL-MCNC: 3 G/DL (ref 3.4–5)
ALP SERPL-CCNC: 115 U/L (ref 40–150)
ALT SERPL W P-5'-P-CCNC: 78 U/L (ref 0–70)
ANION GAP SERPL CALCULATED.3IONS-SCNC: 7 MMOL/L (ref 3–14)
AST SERPL W P-5'-P-CCNC: 67 U/L (ref 0–45)
BASOPHILS # BLD AUTO: 0.1 10E9/L (ref 0–0.2)
BASOPHILS NFR BLD AUTO: 0.3 %
BILIRUB SERPL-MCNC: 0.9 MG/DL (ref 0.2–1.3)
BUN SERPL-MCNC: 17 MG/DL (ref 7–30)
CALCIUM SERPL-MCNC: 8.3 MG/DL (ref 8.5–10.1)
CHLORIDE SERPL-SCNC: 102 MMOL/L (ref 94–109)
CO2 SERPL-SCNC: 28 MMOL/L (ref 20–32)
CREAT SERPL-MCNC: 1.02 MG/DL (ref 0.66–1.25)
DIFFERENTIAL METHOD BLD: ABNORMAL
EOSINOPHIL # BLD AUTO: 0 10E9/L (ref 0–0.7)
EOSINOPHIL NFR BLD AUTO: 0 %
ERYTHROCYTE [DISTWIDTH] IN BLOOD BY AUTOMATED COUNT: 12.9 % (ref 10–15)
GFR SERPL CREATININE-BSD FRML MDRD: 75 ML/MIN/{1.73_M2}
GLUCOSE SERPL-MCNC: 126 MG/DL (ref 70–99)
HCT VFR BLD AUTO: 38.9 % (ref 40–53)
HGB BLD-MCNC: 13.2 G/DL (ref 13.3–17.7)
IMM GRANULOCYTES # BLD: 0.3 10E9/L (ref 0–0.4)
IMM GRANULOCYTES NFR BLD: 1.1 %
LACTATE BLD-SCNC: 1 MMOL/L (ref 0.7–2)
LYMPHOCYTES # BLD AUTO: 1.2 10E9/L (ref 0.8–5.3)
LYMPHOCYTES NFR BLD AUTO: 5.5 %
MCH RBC QN AUTO: 29.2 PG (ref 26.5–33)
MCHC RBC AUTO-ENTMCNC: 33.9 G/DL (ref 31.5–36.5)
MCV RBC AUTO: 86 FL (ref 78–100)
MONOCYTES # BLD AUTO: 2.2 10E9/L (ref 0–1.3)
MONOCYTES NFR BLD AUTO: 9.8 %
NEUTROPHILS # BLD AUTO: 18.4 10E9/L (ref 1.6–8.3)
NEUTROPHILS NFR BLD AUTO: 83.3 %
NRBC # BLD AUTO: 0 10*3/UL
NRBC BLD AUTO-RTO: 0 /100
PLATELET # BLD AUTO: 213 10E9/L (ref 150–450)
POTASSIUM SERPL-SCNC: 3.4 MMOL/L (ref 3.4–5.3)
PROT SERPL-MCNC: 6.8 G/DL (ref 6.8–8.8)
RBC # BLD AUTO: 4.52 10E12/L (ref 4.4–5.9)
SODIUM SERPL-SCNC: 137 MMOL/L (ref 133–144)
WBC # BLD AUTO: 22.1 10E9/L (ref 4–11)

## 2019-11-24 PROCEDURE — 25000566 ZZH SEVOFLURANE, EA 15 MIN: Performed by: SURGERY

## 2019-11-24 PROCEDURE — 25000128 H RX IP 250 OP 636: Performed by: SURGERY

## 2019-11-24 PROCEDURE — 25000132 ZZH RX MED GY IP 250 OP 250 PS 637: Performed by: SURGERY

## 2019-11-24 PROCEDURE — 36415 COLL VENOUS BLD VENIPUNCTURE: CPT | Performed by: SURGERY

## 2019-11-24 PROCEDURE — 25000128 H RX IP 250 OP 636: Performed by: NURSE ANESTHETIST, CERTIFIED REGISTERED

## 2019-11-24 PROCEDURE — 37000008 ZZH ANESTHESIA TECHNICAL FEE, 1ST 30 MIN: Performed by: SURGERY

## 2019-11-24 PROCEDURE — 25000128 H RX IP 250 OP 636: Performed by: INTERNAL MEDICINE

## 2019-11-24 PROCEDURE — 25000128 H RX IP 250 OP 636: Performed by: FAMILY MEDICINE

## 2019-11-24 PROCEDURE — 85025 COMPLETE CBC W/AUTO DIFF WBC: CPT | Performed by: SURGERY

## 2019-11-24 PROCEDURE — 25800030 ZZH RX IP 258 OP 636: Performed by: SURGERY

## 2019-11-24 PROCEDURE — 25000132 ZZH RX MED GY IP 250 OP 250 PS 637: Performed by: FAMILY MEDICINE

## 2019-11-24 PROCEDURE — 88304 TISSUE EXAM BY PATHOLOGIST: CPT | Mod: 26 | Performed by: SURGERY

## 2019-11-24 PROCEDURE — 25000132 ZZH RX MED GY IP 250 OP 250 PS 637: Performed by: NURSE ANESTHETIST, CERTIFIED REGISTERED

## 2019-11-24 PROCEDURE — 71000013 ZZH RECOVERY PHASE 1 LEVEL 1 EA ADDTL HR: Performed by: SURGERY

## 2019-11-24 PROCEDURE — 25000125 ZZHC RX 250: Performed by: SURGERY

## 2019-11-24 PROCEDURE — 37000009 ZZH ANESTHESIA TECHNICAL FEE, EACH ADDTL 15 MIN: Performed by: SURGERY

## 2019-11-24 PROCEDURE — 47562 LAPAROSCOPIC CHOLECYSTECTOMY: CPT | Performed by: SURGERY

## 2019-11-24 PROCEDURE — 96367 TX/PROPH/DG ADDL SEQ IV INF: CPT

## 2019-11-24 PROCEDURE — 96361 HYDRATE IV INFUSION ADD-ON: CPT | Mod: 59

## 2019-11-24 PROCEDURE — 36000056 ZZH SURGERY LEVEL 3 1ST 30 MIN: Performed by: SURGERY

## 2019-11-24 PROCEDURE — 25000125 ZZHC RX 250: Performed by: NURSE ANESTHETIST, CERTIFIED REGISTERED

## 2019-11-24 PROCEDURE — 99219 ZZC INITIAL OBSERVATION CARE,LEVL II: CPT | Performed by: FAMILY MEDICINE

## 2019-11-24 PROCEDURE — 27210794 ZZH OR GENERAL SUPPLY STERILE: Performed by: SURGERY

## 2019-11-24 PROCEDURE — 71000012 ZZH RECOVERY PHASE 1 LEVEL 1 FIRST HR: Performed by: SURGERY

## 2019-11-24 PROCEDURE — 27210995 ZZH RX 272: Performed by: SURGERY

## 2019-11-24 PROCEDURE — C9113 INJ PANTOPRAZOLE SODIUM, VIA: HCPCS | Performed by: FAMILY MEDICINE

## 2019-11-24 PROCEDURE — G0378 HOSPITAL OBSERVATION PER HR: HCPCS

## 2019-11-24 PROCEDURE — 25800030 ZZH RX IP 258 OP 636: Performed by: EMERGENCY MEDICINE

## 2019-11-24 PROCEDURE — 83605 ASSAY OF LACTIC ACID: CPT | Performed by: INTERNAL MEDICINE

## 2019-11-24 PROCEDURE — 36000058 ZZH SURGERY LEVEL 3 EA 15 ADDTL MIN: Performed by: SURGERY

## 2019-11-24 PROCEDURE — 88304 TISSUE EXAM BY PATHOLOGIST: CPT | Performed by: SURGERY

## 2019-11-24 PROCEDURE — 80053 COMPREHEN METABOLIC PANEL: CPT | Performed by: SURGERY

## 2019-11-24 PROCEDURE — 27110028 ZZH OR GENERAL SUPPLY NON-STERILE: Performed by: SURGERY

## 2019-11-24 PROCEDURE — 96376 TX/PRO/DX INJ SAME DRUG ADON: CPT

## 2019-11-24 PROCEDURE — 96375 TX/PRO/DX INJ NEW DRUG ADDON: CPT

## 2019-11-24 PROCEDURE — 36415 COLL VENOUS BLD VENIPUNCTURE: CPT | Performed by: INTERNAL MEDICINE

## 2019-11-24 RX ORDER — DEXAMETHASONE SODIUM PHOSPHATE 4 MG/ML
4 INJECTION, SOLUTION INTRA-ARTICULAR; INTRALESIONAL; INTRAMUSCULAR; INTRAVENOUS; SOFT TISSUE
Status: DISCONTINUED | OUTPATIENT
Start: 2019-11-24 | End: 2019-11-24 | Stop reason: HOSPADM

## 2019-11-24 RX ORDER — OXYCODONE HYDROCHLORIDE 5 MG/1
5 TABLET ORAL EVERY 4 HOURS PRN
Status: DISCONTINUED | OUTPATIENT
Start: 2019-11-24 | End: 2019-11-25 | Stop reason: HOSPADM

## 2019-11-24 RX ORDER — SODIUM CHLORIDE, SODIUM LACTATE, POTASSIUM CHLORIDE, CALCIUM CHLORIDE 600; 310; 30; 20 MG/100ML; MG/100ML; MG/100ML; MG/100ML
INJECTION, SOLUTION INTRAVENOUS CONTINUOUS
Status: DISCONTINUED | OUTPATIENT
Start: 2019-11-24 | End: 2019-11-25 | Stop reason: HOSPADM

## 2019-11-24 RX ORDER — DIMENHYDRINATE 50 MG/ML
25 INJECTION, SOLUTION INTRAMUSCULAR; INTRAVENOUS
Status: DISCONTINUED | OUTPATIENT
Start: 2019-11-24 | End: 2019-11-24 | Stop reason: HOSPADM

## 2019-11-24 RX ORDER — ONDANSETRON 2 MG/ML
INJECTION INTRAMUSCULAR; INTRAVENOUS PRN
Status: DISCONTINUED | OUTPATIENT
Start: 2019-11-24 | End: 2019-11-24

## 2019-11-24 RX ORDER — FENTANYL CITRATE 50 UG/ML
INJECTION, SOLUTION INTRAMUSCULAR; INTRAVENOUS PRN
Status: DISCONTINUED | OUTPATIENT
Start: 2019-11-24 | End: 2019-11-24

## 2019-11-24 RX ORDER — NEOSTIGMINE METHYLSULFATE 1 MG/ML
VIAL (ML) INJECTION PRN
Status: DISCONTINUED | OUTPATIENT
Start: 2019-11-24 | End: 2019-11-24

## 2019-11-24 RX ORDER — GABAPENTIN 300 MG/1
300 CAPSULE ORAL ONCE
Status: COMPLETED | OUTPATIENT
Start: 2019-11-24 | End: 2019-11-24

## 2019-11-24 RX ORDER — ONDANSETRON 4 MG/1
4 TABLET, ORALLY DISINTEGRATING ORAL EVERY 30 MIN PRN
Status: DISCONTINUED | OUTPATIENT
Start: 2019-11-24 | End: 2019-11-24 | Stop reason: HOSPADM

## 2019-11-24 RX ORDER — LIDOCAINE 40 MG/G
CREAM TOPICAL
Status: DISCONTINUED | OUTPATIENT
Start: 2019-11-24 | End: 2019-11-25 | Stop reason: HOSPADM

## 2019-11-24 RX ORDER — HYDROXYZINE HYDROCHLORIDE 50 MG/1
50 TABLET, FILM COATED ORAL EVERY 6 HOURS PRN
Status: DISCONTINUED | OUTPATIENT
Start: 2019-11-24 | End: 2019-11-24 | Stop reason: HOSPADM

## 2019-11-24 RX ORDER — NALOXONE HYDROCHLORIDE 0.4 MG/ML
.1-.4 INJECTION, SOLUTION INTRAMUSCULAR; INTRAVENOUS; SUBCUTANEOUS
Status: DISCONTINUED | OUTPATIENT
Start: 2019-11-24 | End: 2019-11-24 | Stop reason: HOSPADM

## 2019-11-24 RX ORDER — ONDANSETRON 2 MG/ML
4 INJECTION INTRAMUSCULAR; INTRAVENOUS EVERY 30 MIN PRN
Status: DISCONTINUED | OUTPATIENT
Start: 2019-11-24 | End: 2019-11-24 | Stop reason: HOSPADM

## 2019-11-24 RX ORDER — MEPERIDINE HYDROCHLORIDE 25 MG/ML
12.5 INJECTION INTRAMUSCULAR; INTRAVENOUS; SUBCUTANEOUS EVERY 5 MIN PRN
Status: DISCONTINUED | OUTPATIENT
Start: 2019-11-24 | End: 2019-11-24 | Stop reason: HOSPADM

## 2019-11-24 RX ORDER — DEXAMETHASONE SODIUM PHOSPHATE 4 MG/ML
INJECTION, SOLUTION INTRA-ARTICULAR; INTRALESIONAL; INTRAMUSCULAR; INTRAVENOUS; SOFT TISSUE PRN
Status: DISCONTINUED | OUTPATIENT
Start: 2019-11-24 | End: 2019-11-24

## 2019-11-24 RX ORDER — TAMSULOSIN HYDROCHLORIDE 0.4 MG/1
0.4 CAPSULE ORAL DAILY
Status: DISCONTINUED | OUTPATIENT
Start: 2019-11-24 | End: 2019-11-25 | Stop reason: HOSPADM

## 2019-11-24 RX ORDER — HYDROXYZINE HYDROCHLORIDE 25 MG/1
25 TABLET, FILM COATED ORAL EVERY 6 HOURS PRN
Status: DISCONTINUED | OUTPATIENT
Start: 2019-11-24 | End: 2019-11-24 | Stop reason: HOSPADM

## 2019-11-24 RX ORDER — PROPOFOL 10 MG/ML
INJECTION, EMULSION INTRAVENOUS PRN
Status: DISCONTINUED | OUTPATIENT
Start: 2019-11-24 | End: 2019-11-24

## 2019-11-24 RX ORDER — FENTANYL CITRATE 50 UG/ML
25-50 INJECTION, SOLUTION INTRAMUSCULAR; INTRAVENOUS
Status: DISCONTINUED | OUTPATIENT
Start: 2019-11-24 | End: 2019-11-24 | Stop reason: HOSPADM

## 2019-11-24 RX ORDER — LIDOCAINE HYDROCHLORIDE 10 MG/ML
INJECTION, SOLUTION INFILTRATION; PERINEURAL PRN
Status: DISCONTINUED | OUTPATIENT
Start: 2019-11-24 | End: 2019-11-24

## 2019-11-24 RX ORDER — CARVEDILOL 6.25 MG/1
6.25 TABLET ORAL 2 TIMES DAILY WITH MEALS
Status: DISCONTINUED | OUTPATIENT
Start: 2019-11-24 | End: 2019-11-25 | Stop reason: HOSPADM

## 2019-11-24 RX ORDER — GLYCOPYRROLATE 0.2 MG/ML
INJECTION, SOLUTION INTRAMUSCULAR; INTRAVENOUS PRN
Status: DISCONTINUED | OUTPATIENT
Start: 2019-11-24 | End: 2019-11-24

## 2019-11-24 RX ORDER — ACETAMINOPHEN 325 MG/1
975 TABLET ORAL ONCE
Status: COMPLETED | OUTPATIENT
Start: 2019-11-24 | End: 2019-11-24

## 2019-11-24 RX ORDER — KETOROLAC TROMETHAMINE 30 MG/ML
INJECTION, SOLUTION INTRAMUSCULAR; INTRAVENOUS PRN
Status: DISCONTINUED | OUTPATIENT
Start: 2019-11-24 | End: 2019-11-24

## 2019-11-24 RX ORDER — BUPIVACAINE HYDROCHLORIDE AND EPINEPHRINE 5; 5 MG/ML; UG/ML
INJECTION, SOLUTION PERINEURAL PRN
Status: DISCONTINUED | OUTPATIENT
Start: 2019-11-24 | End: 2019-11-24 | Stop reason: HOSPADM

## 2019-11-24 RX ORDER — SODIUM CHLORIDE, SODIUM LACTATE, POTASSIUM CHLORIDE, CALCIUM CHLORIDE 600; 310; 30; 20 MG/100ML; MG/100ML; MG/100ML; MG/100ML
INJECTION, SOLUTION INTRAVENOUS CONTINUOUS
Status: DISCONTINUED | OUTPATIENT
Start: 2019-11-24 | End: 2019-11-24 | Stop reason: HOSPADM

## 2019-11-24 RX ADMIN — OMEPRAZOLE 40 MG: 20 CAPSULE, DELAYED RELEASE ORAL at 21:24

## 2019-11-24 RX ADMIN — FENTANYL CITRATE 150 MCG: 50 INJECTION, SOLUTION INTRAMUSCULAR; INTRAVENOUS at 10:15

## 2019-11-24 RX ADMIN — CARVEDILOL 6.25 MG: 6.25 TABLET, FILM COATED ORAL at 17:55

## 2019-11-24 RX ADMIN — SODIUM CHLORIDE, POTASSIUM CHLORIDE, SODIUM LACTATE AND CALCIUM CHLORIDE: 600; 310; 30; 20 INJECTION, SOLUTION INTRAVENOUS at 22:10

## 2019-11-24 RX ADMIN — PROPOFOL 150 MG: 10 INJECTION, EMULSION INTRAVENOUS at 10:15

## 2019-11-24 RX ADMIN — HYDROMORPHONE HYDROCHLORIDE 0.5 MG: 1 INJECTION, SOLUTION INTRAMUSCULAR; INTRAVENOUS; SUBCUTANEOUS at 04:20

## 2019-11-24 RX ADMIN — OXYCODONE HYDROCHLORIDE 5 MG: 5 TABLET ORAL at 09:09

## 2019-11-24 RX ADMIN — TAMSULOSIN HYDROCHLORIDE 0.4 MG: 0.4 CAPSULE ORAL at 16:28

## 2019-11-24 RX ADMIN — HYDROMORPHONE HYDROCHLORIDE 0.5 MG: 1 INJECTION, SOLUTION INTRAMUSCULAR; INTRAVENOUS; SUBCUTANEOUS at 07:23

## 2019-11-24 RX ADMIN — KETOROLAC TROMETHAMINE 15 MG: 30 INJECTION, SOLUTION INTRAMUSCULAR at 10:43

## 2019-11-24 RX ADMIN — GLYCOPYRROLATE 0.2 MG: 0.2 INJECTION, SOLUTION INTRAMUSCULAR; INTRAVENOUS at 10:15

## 2019-11-24 RX ADMIN — ACETAMINOPHEN 975 MG: 325 TABLET, FILM COATED ORAL at 16:29

## 2019-11-24 RX ADMIN — Medication 4 MG: at 11:01

## 2019-11-24 RX ADMIN — ROCURONIUM BROMIDE 40 MG: 10 INJECTION INTRAVENOUS at 10:15

## 2019-11-24 RX ADMIN — ONDANSETRON 4 MG: 2 INJECTION INTRAMUSCULAR; INTRAVENOUS at 10:43

## 2019-11-24 RX ADMIN — SODIUM CHLORIDE, POTASSIUM CHLORIDE, SODIUM LACTATE AND CALCIUM CHLORIDE: 600; 310; 30; 20 INJECTION, SOLUTION INTRAVENOUS at 07:19

## 2019-11-24 RX ADMIN — FENTANYL CITRATE 100 MCG: 50 INJECTION, SOLUTION INTRAMUSCULAR; INTRAVENOUS at 10:27

## 2019-11-24 RX ADMIN — ENALAPRILAT 1.25 MG: 1.25 INJECTION INTRAVENOUS at 17:55

## 2019-11-24 RX ADMIN — METOCLOPRAMIDE 10 MG: 5 INJECTION, SOLUTION INTRAMUSCULAR; INTRAVENOUS at 04:59

## 2019-11-24 RX ADMIN — MIDAZOLAM 2 MG: 1 INJECTION INTRAMUSCULAR; INTRAVENOUS at 10:04

## 2019-11-24 RX ADMIN — GABAPENTIN 300 MG: 300 CAPSULE ORAL at 16:28

## 2019-11-24 RX ADMIN — OXYCODONE HYDROCHLORIDE 5 MG: 5 TABLET ORAL at 19:13

## 2019-11-24 RX ADMIN — PANTOPRAZOLE SODIUM 40 MG: 40 INJECTION, POWDER, FOR SOLUTION INTRAVENOUS at 09:09

## 2019-11-24 RX ADMIN — METOCLOPRAMIDE 10 MG: 5 INJECTION, SOLUTION INTRAMUSCULAR; INTRAVENOUS at 14:15

## 2019-11-24 RX ADMIN — CIPROFLOXACIN 400 MG: 2 INJECTION, SOLUTION INTRAVENOUS at 00:19

## 2019-11-24 RX ADMIN — CEFOXITIN SODIUM 2 G: 2 INJECTION, SOLUTION INTRAVENOUS at 04:02

## 2019-11-24 RX ADMIN — HYDROMORPHONE HYDROCHLORIDE 0.5 MG: 1 INJECTION, SOLUTION INTRAMUSCULAR; INTRAVENOUS; SUBCUTANEOUS at 08:17

## 2019-11-24 RX ADMIN — GLYCOPYRROLATE 0.6 MG: 0.2 INJECTION, SOLUTION INTRAMUSCULAR; INTRAVENOUS at 11:01

## 2019-11-24 RX ADMIN — SODIUM CHLORIDE, POTASSIUM CHLORIDE, SODIUM LACTATE AND CALCIUM CHLORIDE: 600; 310; 30; 20 INJECTION, SOLUTION INTRAVENOUS at 13:24

## 2019-11-24 RX ADMIN — CEFOXITIN SODIUM 2 G: 2 INJECTION, SOLUTION INTRAVENOUS at 14:24

## 2019-11-24 RX ADMIN — LIDOCAINE HYDROCHLORIDE 50 MG: 10 INJECTION, SOLUTION INFILTRATION; PERINEURAL at 10:15

## 2019-11-24 RX ADMIN — CEFOXITIN SODIUM 2 G: 2 INJECTION, SOLUTION INTRAVENOUS at 21:25

## 2019-11-24 RX ADMIN — DEXAMETHASONE SODIUM PHOSPHATE 10 MG: 4 INJECTION, SOLUTION INTRA-ARTICULAR; INTRALESIONAL; INTRAMUSCULAR; INTRAVENOUS; SOFT TISSUE at 10:15

## 2019-11-24 RX ADMIN — SODIUM CHLORIDE, POTASSIUM CHLORIDE, SODIUM LACTATE AND CALCIUM CHLORIDE: 600; 310; 30; 20 INJECTION, SOLUTION INTRAVENOUS at 10:43

## 2019-11-24 RX ADMIN — METOCLOPRAMIDE 10 MG: 5 INJECTION, SOLUTION INTRAMUSCULAR; INTRAVENOUS at 21:24

## 2019-11-24 ASSESSMENT — LIFESTYLE VARIABLES: TOBACCO_USE: 1

## 2019-11-24 NOTE — PROGRESS NOTES
No fevers overnight.  White count normalizing.  2 OR today for laparoscopic cholecystectomy and possible discharge this afternoon depending on how patient feels.    Yehuda Puga MD

## 2019-11-24 NOTE — OP NOTE
Preop diagnosis: Acute cholecystitis    Postop diagnosis: Acute on chronic cholecystitis    Procedure: Laparoscopic cholecystectomy with placement of Francisco-Anne drain    Surgeon: Edd    Anesthesia: General endotracheal Ballard CRNA    Procedure: Patient's abdomen was clipped of extraneous hair and cleaned and draped in a sterile manner.  Patient had been on antibiotics prior to surgery and so no additional dosing was needed.  1/4% Marcaine with epinephrine was used to anesthetize all port sites.  Small subumbilical curvilinear incision made and subcutaneous tissues dissected to fascia.  Fascia opened sharply and 12 mm blunt trocar inserted.  Carbon dioxide insufflated to 15 mmHg.  Patient placed into reverse Trendelenburg left side down.  Under direct vision, subxiphoid 11 mm trocar placed as were 2 right-sided 5 mm trochars.  Immediately visible in the right upper quadrant with a large amount of omentum densely adhered to the gallbladder.  The omentum was taken down using blunt dissection electrocautery until the fundus the gallbladder was located.  A needle was inserted in this and 60 cc of dark thick bile was expressed.  The gallbladder was then gently grasped and elevated using blunt dissection with the sucker, the adherent omentum was then dissected free using blunt dissection and a small amount of electrocautery.  The neck of the gallbladder was located.  It was also scarred and and edematous.  Again with mostly blunt dissection the cystic duct was isolated, clipped twice proximally once distally and ligated.  In a similar fashion the cystic artery was located clipped and ligated.  The gallbladder was then removed from the liver bed using electrocautery.  This took quite a bit of time because the gallbladder was densely scarred down and the wall was surprisingly thin and very friable.  Eventually, however, the gallbladder was completely removed from liver bed, it was placed into an Endo Catch bag and brought  out through the subxiphoid port which had to be enlarged slightly to accommodate the stones.  3 L of warm normal saline solution was used to irrigate out the abdominal cavity and this was sucked free until the effluent was clear.  Several small bleeding spots on the liver bed were easily controlled with electrocautery.  The fausto hepatis and liver bed was then covered in FloSeal and a piece of Surgicel dressing placed in the liver bed.  A #10 flat Francisco-Anne drain was placed in Morison's pouch and brought out through 1 of the 5 mm ports.  This was stitched in place using an 0 Vicryl suture.  Finally all trochars were removed under direct vision and the air allowed to desufflate.  The fascial defect of the subumbilical port was closed using an 0 Vicryl suture in a figure-of-eight fashion and this was bolstered with a second 0 Vicryl on top of that and injected with Marcaine.  The fascial defect of the subxiphoid port was also closed using an 0 Vicryl suture in a figure-of-eight fashion and injected with Marcaine.  All wounds were irrigated with normal saline and the skin closed using 4-0 Vicryl running subcuticular stitches and dressed with Dermabond.    Estimated blood loss: 20 mL    IV fluid: 1200 mL

## 2019-11-24 NOTE — ANESTHESIA PREPROCEDURE EVALUATION
Anesthesia Pre-Procedure Evaluation    Patient: Arley Lopez   MRN: [id number] : 1950          Preoperative Diagnosis: Cholecystitis [K81.9]    Procedure(s):  CHOLECYSTECTOMY, LAPAROSCOPIC    Past Medical History:   Diagnosis Date     Essential hypertension      GERD (gastroesophageal reflux disease)      Kidney problem      Past Surgical History:   Procedure Laterality Date     COLONOSCOPY  May 2012    abnormal !     ENDOSCOPY  01/10/2011    Reflux duodenum, Hiatal hernia small, otherwise normal exam.     FOOT SURGERY Left      GENITOURINARY SURGERY      vasectomy     GENITOURINARY SURGERY      spermacele       Anesthesia Evaluation     . Pt has had prior anesthetic. Type: General and MAC           ROS/MED HX    ENT/Pulmonary:     (+)tobacco use, Past use , . .    Neurologic:  - neg neurologic ROS     Cardiovascular:     (+) Dyslipidemia, hypertension----. : . . . :. .       METS/Exercise Tolerance:     Hematologic:  - neg hematologic  ROS       Musculoskeletal:  - neg musculoskeletal ROS       GI/Hepatic:     (+) GERD Inflammatory bowel disease, cholecystitis/cholelithiasis,       Renal/Genitourinary:     (+) chronic renal disease,       Endo:  - neg endo ROS       Psychiatric:  - neg psychiatric ROS       Infectious Disease:  - neg infectious disease ROS       Malignancy:      - no malignancy   Other:                          Physical Exam  Normal systems: cardiovascular, pulmonary and dental    Airway   Mallampati: I  TM distance: >3 FB  Neck ROM: full    Dental     Cardiovascular       Pulmonary             Lab Results   Component Value Date    WBC 22.1 (H) 2019    HGB 13.2 (L) 2019    HCT 38.9 (L) 2019     2019     2019    POTASSIUM 3.4 2019    CHLORIDE 102 2019    CO2 28 2019    BUN 17 2019    CR 1.02 2019     (H) 2019    YARELY 8.3 (L) 2019    MAG 2.1 2012    ALBUMIN 3.0 (L) 2019    PROTTOTAL 6.8  "11/24/2019    ALT 78 (H) 11/24/2019    AST 67 (H) 11/24/2019    ALKPHOS 115 11/24/2019    BILITOTAL 0.9 11/24/2019    LIPASE 61 (L) 11/23/2019       Preop Vitals  BP Readings from Last 3 Encounters:   11/24/19 (!) 144/60   11/23/19 (!) 154/78   11/05/19 138/78    Pulse Readings from Last 3 Encounters:   11/24/19 103   11/23/19 76   11/05/19 94      Resp Readings from Last 3 Encounters:   11/24/19 18   11/22/19 18   05/01/19 16    SpO2 Readings from Last 3 Encounters:   11/24/19 92%   11/23/19 97%   11/05/19 97%      Temp Readings from Last 1 Encounters:   11/24/19 36.6  C (97.9  F) (Oral)    Ht Readings from Last 1 Encounters:   11/23/19 1.702 m (5' 7\")      Wt Readings from Last 1 Encounters:   11/23/19 80.7 kg (177 lb 14.6 oz)    Estimated body mass index is 27.86 kg/m  as calculated from the following:    Height as of this encounter: 1.702 m (5' 7\").    Weight as of this encounter: 80.7 kg (177 lb 14.6 oz).       Anesthesia Plan      History & Physical Review  History and physical reviewed and following examination; no interval change.    ASA Status:  2 emergent.    NPO Status:  > 6 hours    Plan for General and ETT with Intravenous and Propofol induction. Maintenance will be Balanced.    PONV prophylaxis:  Ondansetron (or other 5HT-3) and Dexamethasone or Solumedrol  Additional equipment: Videolaryngoscope      Postoperative Care  Postoperative pain management:  Multi-modal analgesia, IV analgesics and Oral pain medications.      Consents  Anesthetic plan, risks, benefits and alternatives discussed with:  Patient..                 BARBARA Hughes CRNA  "

## 2019-11-24 NOTE — ANESTHESIA POSTPROCEDURE EVALUATION
Patient: Arley Reynoldsr    Procedure(s):  CHOLECYSTECTOMY, LAPAROSCOPIC    Diagnosis:Cholecystitis [K81.9]  Diagnosis Additional Information: No value filed.    Anesthesia Type:  General, ETT    Note:  Anesthesia Post Evaluation    Patient location during evaluation: PACU  Patient participation: Able to fully participate in evaluation  Level of consciousness: awake and alert  Pain management: adequate  Airway patency: patent  Cardiovascular status: acceptable and hemodynamically stable  Respiratory status: acceptable  Hydration status: acceptable  PONV: none     Anesthetic complications: None          Last vitals:  Vitals:    11/24/19 1132 11/24/19 1145 11/24/19 1200   BP: 137/71 130/71 124/72   Pulse:      Resp: 10 15 12   Temp:      SpO2: 99% 98%          Electronically Signed By: BARBARA Hughes CRNA  November 24, 2019  12:04 PM

## 2019-11-24 NOTE — PLAN OF CARE
IV Vasotec held x 2 because diastolic blood pressures were less than 80 (127/67 and 144/60). One dose IV dilaudid 0.5 mg given for abdominal pain. Ambien given at bedtime. Bed alarm on for safety. Patient has been NPO since midnight. IV fluids infusing between doses of antibiotics. Up with SBA.

## 2019-11-24 NOTE — PROGRESS NOTES
Patient transferred down to surgery at 10:05 am. Patient was stable at transfer, report given to DUANE Alicia. Wife here and aware of transport.

## 2019-11-24 NOTE — PLAN OF CARE
Patient came back from surgery approximately 1pm. Has had absolutely no pain since then. Not able to void yet, spoke with surgeon. Started Flomax and received orders for straight catheter if necessary. Stab sites are clean, dry and intact, ice to sites. IV fluids running. Sating in mid 90's on room air. Patient using call light appropriately.  This writer daniela cath'd patient, he tolerated this well, volume documented

## 2019-11-24 NOTE — ED NOTES
"Patient has  Melvin to Observation  order. Patient has been given the Observation brochure -  What does Observation mean to me.\"  Patient has been given the opportunity to ask questions about observation status and their plan of care.      Wanda Chu RN    "

## 2019-11-24 NOTE — ANESTHESIA CARE TRANSFER NOTE
Patient: Arley HURST Jessica    Procedure(s):  CHOLECYSTECTOMY, LAPAROSCOPIC    Diagnosis: Cholecystitis [K81.9]  Diagnosis Additional Information: No value filed.    Anesthesia Type:   General, ETT     Note:  Airway :Face Mask  Patient transferred to:PACU  Handoff Report: Identifed the Patient, Identified the Reponsible Provider, Reviewed the pertinent medical history, Discussed the surgical course, Reviewed Intra-OP anesthesia mangement and issues during anesthesia, Set expectations for post-procedure period and Allowed opportunity for questions and acknowledgement of understanding      Vitals: (Last set prior to Anesthesia Care Transfer)    CRNA VITALS  11/24/2019 1048 - 11/24/2019 1120      11/24/2019             Pulse:  94    SpO2:  100 %                Electronically Signed By: BARBARA Hughes CRNA  November 24, 2019  11:20 AM

## 2019-11-24 NOTE — PROGRESS NOTES
"[facility] ADMISSION NOTE    Patient admitted to room 2314 at approximately 2308 via cart from emergency room. Patient was accompanied by transport tech.     Verbal SBAR report received from  prior to patient arrival.     Patient ambulated to bed independently. Patient alert and oriented X 3. Pain is controlled with current analgesics.  Medication(s) being used: dilaudid 0-10 Pain Scale: 8. Admission vital signs: Blood pressure (!) 147/68, pulse 97, temperature 97.7  F (36.5  C), temperature source Oral, resp. rate 18, height 1.702 m (5' 7\"), weight 80.7 kg (177 lb 14.6 oz), SpO2 96 %. Patient was oriented to plan of care, call light, bed controls, tv, telephone, bathroom and visiting hours.     Risk Assessment    The following safety risks were identified during admission: fall. Yellow risk band applied: YES.     Skin Initial Assessment    This writer admitted this patient and completed a full skin assessment and Mj score in the Adult PCS flowsheet. Appropriate interventions initiated as needed.     Secondary skin check completed by Kiara.         Education    Patient has a [place] to Observation order: Yes  Observation education completed and documented: Yes      Gilda Scott RN    "

## 2019-11-24 NOTE — H&P
[facility] History and Physical    Arley Lopez MRN# [id number]   Age: 69 year old YOB: 1950     Date of Admission:  11/23/2019      Primary care provider: Daniel Rice          Assessment and Plan:   Assessment & Plan         Acute calculous cholecystitis  11/24/2019 -- patient started on antibiotics in ER - initially cipro/flagyl due to penicillin allergy, changed to cefoxitin overnight due to apparent intolerance of flagyl.  No fever or other changes, WBC up but improving.  Plan for cholecystectomy today.  Has dilaudid for pain control, oral oxycodone ordered for post-operatively.  Hopeful discharge either later today or tomorrow depending on pain control, appearance of gallbladder during surgery and surgery's recommendations.  Continue antibiotics while here, but plan to stop on discharge.      Preoperative evaluation   Patient low risk for low risk procedure, no modifiable risk factors.         GERD (gastroesophageal reflux disease)  11/24/2019 -- takes omeprazole twice daily - giving single dose of IV protonix now, then resume home omeprazole this evening.         Essential hypertension  11/24/2019 -- continue home coreg today, resume hydrochlorothiazide tomorrow.         Lactose intolerance  11/24/2019 -- lactose free diet post-operatively.         Irritable bowel syndrome with diarrhea  11/24/2019 -- no symptoms, continue prn outpatient management.           Prophylaxis  Low risk, reassess if unable to discharge by tomorrow.      Lines  PIV       Disposition  Hope for discharge later today or tomorrow pending how he does post-operatively as above.                Chief Complaint:   Right upper quadrant abdominal pain   History is obtained from the patient          History of Present Illness:   This patient is a 69 year old  male with a significant past medical history of hypertension, GERD, lactose intolerance and irritable bowel syndrome who presents with right upper  quadrant abdominal pain.  Patient had gallstones and some pain last May, but no clear cholecystitis so decided on conservative cares.  Then 2 days ago developed abdominal pain, mild bloating and nausea without vomiting - he was seen in the ER and seemed to improve with GI cocktail and labs looked good so was sent home, but symptoms flared again shortly after return home and worsened throughout the day yesterday so returned to the ER yesterday evening.  Pain is mostly right upper quadrant, 8/10.  Not sure what changes it, but pain medications helping.  Pain today about the same as last night, no new pain or concerns.  Feels hungry but also some mild nausea still.  No fever or chills.  No urinary symptoms or changes in bowel habits.      No prior personal history or family history of trouble with anesthesia.   No changes in exercise/activity tolerance, ok with > 4 METs by report. No chest pain, heaviness, dyspnea or tightness. Only fairly recent medication change was stopping amlodipine and replacing it with hydrochlorothiazide about 3 weeks ago.      Non-smoker, occasional alcohol, maybe 1-2 per week in the summer, less this time of year.  No illicit drug use. Lives independently.  Former .               Past Medical History:   I have reviewed this patient's past medical history.  Patient Active Problem List    Diagnosis Date Noted     Acute calculous cholecystitis 11/23/2019     Priority: Medium     Acute cholecystitis 11/23/2019     Priority: Medium     Lactose intolerance 11/15/2019     Priority: Medium     Irritable bowel syndrome with diarrhea 12/07/2015     Priority: Medium     CARDIOVASCULAR SCREENING; LDL GOAL LESS THAN 130 02/27/2013     Priority: Medium     GERD (gastroesophageal reflux disease) 07/12/2012     Priority: Medium     Essential hypertension 07/12/2012     Priority: Medium              Past Surgical History:   I have reviewed this patient's past surgical history   Past Surgical  "History:   Procedure Laterality Date     COLONOSCOPY  May 2012    abnormal !     ENDOSCOPY  01/10/2011    Reflux duodenum, Hiatal hernia small, otherwise normal exam.     FOOT SURGERY Left      GENITOURINARY SURGERY      vasectomy     GENITOURINARY SURGERY      spermacele             Social History:   I have reviewed this patient's social history   Social History     Tobacco Use     Smoking status: Former Smoker     Packs/day: 2.00     Years: 2.00     Pack years: 4.00     Last attempt to quit: 1992     Years since quittin.9     Smokeless tobacco: Never Used   Substance Use Topics     Alcohol use: Yes     Comment: beer per monthly             Family History:   I have reviewed this patient's family history  Family History   Problem Relation Age of Onset     Cancer Mother      Rheumatic fever Mother      Suicide Father              Immunizations:   Immunizations are current          Allergies:     Allergies   Allergen Reactions     Penicillin G Hives and Swelling             Medications:     Medications Prior to Admission   Medication Sig Dispense Refill Last Dose     carvedilol (COREG) 6.25 MG tablet Take 6.25 mg by mouth 2 times daily (with meals)   Taking     hydrochlorothiazide (HYDRODIURIL) 25 MG tablet Take 1 tablet (25 mg) by mouth daily 90 tablet 3      lidocaine (XYLOCAINE) 2 % solution Swish and swallow 10 mLs in mouth every 3 hours as needed for moderate pain ; Max 8 doses/24 hour period. 100 mL 1      omeprazole (PRILOSEC) 40 MG DR capsule Take 1 capsule (40 mg) by mouth 2 times daily 180 capsule 3 Taking             Review of Systems:    ROS: 10 point ROS neg other than the symptoms noted above in the HPI.             Physical Exam:   Blood pressure 136/68, pulse 98, temperature 99.3  F (37.4  C), temperature source Oral, resp. rate 16, height 1.702 m (5' 7\"), weight 80.7 kg (177 lb 14.6 oz), SpO2 92 %.  Temperatures:  Current - Temp: 99.3  F (37.4  C); Max - Temp  Av.2  F (36.8  C)  Min: " 97.7  F (36.5  C)  Max: 99.3  F (37.4  C)  Respiration range: Resp  Av.3  Min: 16  Max: 18  Pulse range: Pulse  Av.8  Min: 92  Max: 107  Blood pressure range: Systolic (24hrs), Av , Min:128 , Max:160   ; Diastolic (24hrs), Av, Min:60, Max:119    Pulse oximetry range: SpO2  Av.4 %  Min: 92 %  Max: 98 %  No intake or output data in the 24 hours ending 19 0810  EXAM:  General: awake and alert, NAD, oriented x 3  Head: normocephalic  Neck: unremarkable, no lymphadenopathy   HEENT: oropharynx pink and moist    Heart: Regular rate and rhythm, no murmurs, rubs, or gallops  Lungs: clear to auscultation bilaterally with good air movement throughout  Abdomen: soft, tender mostly in right upper quadrant, no rebound but slight guarding, bowel sounds present, non-distended.  Extremities: no edema in lower extremities   Skin unremarkable.             Data:     Results for orders placed or performed during the hospital encounter of 19 (from the past 24 hour(s))   CBC with platelets differential   Result Value Ref Range    WBC 24.6 (H) 4.0 - 11.0 10e9/L    RBC Count 5.10 4.4 - 5.9 10e12/L    Hemoglobin 15.1 13.3 - 17.7 g/dL    Hematocrit 43.8 40.0 - 53.0 %    MCV 86 78 - 100 fl    MCH 29.6 26.5 - 33.0 pg    MCHC 34.5 31.5 - 36.5 g/dL    RDW 12.7 10.0 - 15.0 %    Platelet Count 234 150 - 450 10e9/L    Diff Method Automated Method     % Neutrophils 83.1 %    % Lymphocytes 5.2 %    % Monocytes 10.7 %    % Eosinophils 0.0 %    % Basophils 0.3 %    % Immature Granulocytes 0.7 %    Nucleated RBCs 0 0 /100    Absolute Neutrophil 20.4 (H) 1.6 - 8.3 10e9/L    Absolute Lymphocytes 1.3 0.8 - 5.3 10e9/L    Absolute Monocytes 2.6 (H) 0.0 - 1.3 10e9/L    Absolute Eosinophils 0.0 0.0 - 0.7 10e9/L    Absolute Basophils 0.1 0.0 - 0.2 10e9/L    Abs Immature Granulocytes 0.2 0 - 0.4 10e9/L    Absolute Nucleated RBC 0.0    Comprehensive metabolic panel   Result Value Ref Range    Sodium 136 133 - 144 mmol/L     Potassium 3.4 3.4 - 5.3 mmol/L    Chloride 102 94 - 109 mmol/L    Carbon Dioxide 27 20 - 32 mmol/L    Anion Gap 7 3 - 14 mmol/L    Glucose 115 (H) 70 - 99 mg/dL    Urea Nitrogen 22 7 - 30 mg/dL    Creatinine 1.13 0.66 - 1.25 mg/dL    GFR Estimate 67 >60 mL/min/[1.73_m2]    GFR Estimate If Black 77 >60 mL/min/[1.73_m2]    Calcium 9.0 8.5 - 10.1 mg/dL    Bilirubin Total 0.8 0.2 - 1.3 mg/dL    Albumin 3.9 3.4 - 5.0 g/dL    Protein Total 8.1 6.8 - 8.8 g/dL    Alkaline Phosphatase 110 40 - 150 U/L    ALT 21 0 - 70 U/L    AST 16 0 - 45 U/L   Lactic acid whole blood   Result Value Ref Range    Lactic Acid 1.1 0.7 - 2.0 mmol/L   Lipase   Result Value Ref Range    Lipase 61 (L) 73 - 393 U/L   Abdomen US, limited (RUQ only)    Narrative    US ABDOMEN LIMITED   11/23/2019 7:19 PM     HISTORY:  Epigastric abdominal pain, known history of cholelithiasis.   Evaluate for acute cholecystitis with CBD stone.  Compare with  ultrasound from May 2019.    COMPARISON: Abdominal ultrasound on 5/6/2019.    FINDINGS:    Gallbladder: Shadowing gallstones near the gallbladder neck with  gallbladder sludge. No gallbladder wall thickening or pericholecystic  fluid. Sonographic Jones's sign is positive.    Bile ducts:  CHD is normal diameter.  No intrahepatic biliary  dilatation. The distal portion of the common bile duct is not clearly  visualized due to overlying bowel gas.    Liver: The liver demonstrates normal parenchymal echogenicity. No  suspicious focal hepatic mass.    Pancreas:  Partially obscured by overlying bowel gas,  but grossly  unremarkable.     Right kidney:  Normal.     Aorta and IVC:  Not specifically assessed.       Impression    IMPRESSION:   Cholelithiasis and gallbladder sludge. No gallbladder  wall thickening or pericholecystic fluid. Sonographic Jones's sign  appears to be positive. Findings appear similar to 5/6/2019 exam and  less likely to represent acute cholecystitis given the lack of  gallbladder wall thickening  and pericholecystic fluid. If clinical  suspicion remains high HIDA scan can help further characterization.     DALTON WREN MD   UA reflex to Microscopic   Result Value Ref Range    Color Urine Yellow     Appearance Urine Clear     Glucose Urine Negative NEG^Negative mg/dL    Bilirubin Urine Negative NEG^Negative    Ketones Urine 20 (A) NEG^Negative mg/dL    Specific Gravity Urine 1.016 1.003 - 1.035    Blood Urine Small (A) NEG^Negative    pH Urine 5.0 5.0 - 7.0 pH    Protein Albumin Urine Negative NEG^Negative mg/dL    Urobilinogen mg/dL 0.0 0.0 - 2.0 mg/dL    Nitrite Urine Negative NEG^Negative    Leukocyte Esterase Urine Negative NEG^Negative    Source Midstream Urine     RBC Urine 1 0 - 2 /HPF    WBC Urine 1 0 - 5 /HPF    Mucous Urine Present (A) NEG^Negative /LPF   CT Abdomen Pelvis w Contrast    Narrative    CT ABDOMEN PELVIS WITH CONTRAST   11/23/2019 9:35 PM     HISTORY: Right-sided abdominal pain, elevated white count, known  gallstones with gallbladder sludge but no convincing evidence of acute  cholecystitis on ultrasound. Evaluate for other acute intra-abdominal  process.    TECHNIQUE:  CT abdomen and pelvis with 89 mL Isovue 370 IV. Radiation  dose for this scan was reduced using automated exposure control,  adjustment of the mA and/or kV according to patient size, or iterative  reconstruction technique.    COMPARISON: Abdominal ultrasound on same day earlier.    FINDINGS:   Lung bases: Minimal bibasilar atelectasis.    Abdomen/pelvis:    The gallbladder is distended with layering gallstones and  pericholecystic fat stranding and small amount of fluid (series 3  image 57 and series 2 image 28). No suspicious focal hepatic lesion.  No splenomegaly. Small splenule in splenic hilum. No adrenal nodules.  No main pancreatic ductal dilatation. Multiple bilateral cortical  scarring, likely related to prior insult. Subcentimeter hypodense  focus at the lower pole of the left kidney (series 2 image 44),  to  small to characterize, likely renal cyst. No radiodense kidney stones  or hydronephrosis.    No abnormally dilated bowel loops. No significant free fluid in the  abdomen or pelvis. No free peritoneal or portal venous gas. The  appendix is visualized and appears normal. Moderate predominantly  aortobiiliac atherosclerotic vascular calcification. No significant  abdominopelvic lymphadenopathy. Colonic diverticulosis without CT  evidence of acute diverticulitis.    Bones and soft tissue: No suspicious osseous lesion.      Impression    IMPRESSION:  1. Distended gallbladder, layering gallstones with mild  pericholecystic fluid and fat stranding, findings highly suspicious  for acute cholecystitis.  2. Colonic diverticulosis without CT evidence of acute diverticulitis.      DALTON WREN MD   Lactic acid level STAT   Result Value Ref Range    Lactate for Sepsis Protocol 1.0 0.7 - 2.0 mmol/L   CBC with platelets differential   Result Value Ref Range    WBC 22.1 (H) 4.0 - 11.0 10e9/L    RBC Count 4.52 4.4 - 5.9 10e12/L    Hemoglobin 13.2 (L) 13.3 - 17.7 g/dL    Hematocrit 38.9 (L) 40.0 - 53.0 %    MCV 86 78 - 100 fl    MCH 29.2 26.5 - 33.0 pg    MCHC 33.9 31.5 - 36.5 g/dL    RDW 12.9 10.0 - 15.0 %    Platelet Count 213 150 - 450 10e9/L    Diff Method Automated Method     % Neutrophils 83.3 %    % Lymphocytes 5.5 %    % Monocytes 9.8 %    % Eosinophils 0.0 %    % Basophils 0.3 %    % Immature Granulocytes 1.1 %    Nucleated RBCs 0 0 /100    Absolute Neutrophil 18.4 (H) 1.6 - 8.3 10e9/L    Absolute Lymphocytes 1.2 0.8 - 5.3 10e9/L    Absolute Monocytes 2.2 (H) 0.0 - 1.3 10e9/L    Absolute Eosinophils 0.0 0.0 - 0.7 10e9/L    Absolute Basophils 0.1 0.0 - 0.2 10e9/L    Abs Immature Granulocytes 0.3 0 - 0.4 10e9/L    Absolute Nucleated RBC 0.0    Comprehensive metabolic panel   Result Value Ref Range    Sodium 137 133 - 144 mmol/L    Potassium 3.4 3.4 - 5.3 mmol/L    Chloride 102 94 - 109 mmol/L    Carbon Dioxide 28 20  - 32 mmol/L    Anion Gap 7 3 - 14 mmol/L    Glucose 126 (H) 70 - 99 mg/dL    Urea Nitrogen 17 7 - 30 mg/dL    Creatinine 1.02 0.66 - 1.25 mg/dL    GFR Estimate 75 >60 mL/min/[1.73_m2]    GFR Estimate If Black 87 >60 mL/min/[1.73_m2]    Calcium 8.3 (L) 8.5 - 10.1 mg/dL    Bilirubin Total 0.9 0.2 - 1.3 mg/dL    Albumin 3.0 (L) 3.4 - 5.0 g/dL    Protein Total 6.8 6.8 - 8.8 g/dL    Alkaline Phosphatase 115 40 - 150 U/L    ALT 78 (H) 0 - 70 U/L    AST 67 (H) 0 - 45 U/L       All imaging studies reviewed by me.    Attestation:  I have reviewed today's vital signs, notes, medications, labs and imaging.  Amount of time performed on this history and physical: 50 minutes.        Babar Gilliland MD

## 2019-11-24 NOTE — H&P
69-year-old male was seen in emergency room yesterday complaining of upper abdominal pain.  Patient had a GI cocktail which seemed to improve him and he went home but reported the symptoms never actually resolved.  He reports again showing increasing upper abdominal pain especially in the right side with radiation to the back.  Patient reports nausea without vomiting.  No aggravating or alleviating factors.  Patient reports normal bowel movements.  He has a known history of gallstones.    Patient Active Problem List   Diagnosis     GERD (gastroesophageal reflux disease)     Essential hypertension     CARDIOVASCULAR SCREENING; LDL GOAL LESS THAN 130     Lactose intolerance     Irritable bowel syndrome with diarrhea     Acute calculous cholecystitis       Past Medical History:   Diagnosis Date     Essential hypertension      GERD (gastroesophageal reflux disease)      Kidney problem        Past Surgical History:   Procedure Laterality Date     COLONOSCOPY  May 2012    abnormal !     ENDOSCOPY  01/10/2011    Reflux duodenum, Hiatal hernia small, otherwise normal exam.     FOOT SURGERY Left      GENITOURINARY SURGERY      vasectomy     GENITOURINARY SURGERY      spermacele       Family History   Problem Relation Age of Onset     Cancer Mother      Rheumatic fever Mother      Suicide Father        Social History     Tobacco Use     Smoking status: Former Smoker     Packs/day: 2.00     Years: 2.00     Pack years: 4.00     Last attempt to quit: 1992     Years since quittin.9     Smokeless tobacco: Never Used   Substance Use Topics     Alcohol use: Yes     Comment: beer per monthly        History   Drug Use No       Current Outpatient Medications   Medication Sig Dispense Refill     carvedilol (COREG) 6.25 MG tablet Take 6.25 mg by mouth 2 times daily (with meals)       hydrochlorothiazide (HYDRODIURIL) 25 MG tablet Take 1 tablet (25 mg) by mouth daily 90 tablet 3     lidocaine (XYLOCAINE) 2 % solution Swish and  swallow 10 mLs in mouth every 3 hours as needed for moderate pain ; Max 8 doses/24 hour period. 100 mL 1     omeprazole (PRILOSEC) 40 MG DR capsule Take 1 capsule (40 mg) by mouth 2 times daily 180 capsule 3       Allergies   Allergen Reactions     Penicillin G Hives and Swelling       CBC  Recent Labs   Lab Test 11/23/19  1752   WBC 24.6*   RBC 5.10   HGB 15.1   HCT 43.8   MCV 86   MCH 29.6   MCHC 34.5   RDW 12.7          BMP  Recent Labs   Lab Test 11/23/19  1752      POTASSIUM 3.4   YARELY 9.0   CHLORIDE 102   CO2 27   BUN 22   CR 1.13   *       LFTs  Recent Labs   Lab Test 11/23/19  1752   PROTTOTAL 8.1   ALBUMIN 3.9   BILITOTAL 0.8   ALKPHOS 110   AST 16   ALT 21     Results for orders placed or performed during the hospital encounter of 11/23/19   Abdomen US, limited (RUQ only)    Narrative    US ABDOMEN LIMITED   11/23/2019 7:19 PM     HISTORY:  Epigastric abdominal pain, known history of cholelithiasis.   Evaluate for acute cholecystitis with CBD stone.  Compare with  ultrasound from May 2019.    COMPARISON: Abdominal ultrasound on 5/6/2019.    FINDINGS:    Gallbladder: Shadowing gallstones near the gallbladder neck with  gallbladder sludge. No gallbladder wall thickening or pericholecystic  fluid. Sonographic Jones's sign is positive.    Bile ducts:  CHD is normal diameter.  No intrahepatic biliary  dilatation. The distal portion of the common bile duct is not clearly  visualized due to overlying bowel gas.    Liver: The liver demonstrates normal parenchymal echogenicity. No  suspicious focal hepatic mass.    Pancreas:  Partially obscured by overlying bowel gas,  but grossly  unremarkable.     Right kidney:  Normal.     Aorta and IVC:  Not specifically assessed.       Impression    IMPRESSION:   Cholelithiasis and gallbladder sludge. No gallbladder  wall thickening or pericholecystic fluid. Sonographic Jones's sign  appears to be positive. Findings appear similar to 5/6/2019 exam  and  less likely to represent acute cholecystitis given the lack of  gallbladder wall thickening and pericholecystic fluid. If clinical  suspicion remains high HIDA scan can help further characterization.     DALTON WREN MD   CT Abdomen Pelvis w Contrast    Narrative    CT ABDOMEN PELVIS WITH CONTRAST   11/23/2019 9:35 PM     HISTORY: Right-sided abdominal pain, elevated white count, known  gallstones with gallbladder sludge but no convincing evidence of acute  cholecystitis on ultrasound. Evaluate for other acute intra-abdominal  process.    TECHNIQUE:  CT abdomen and pelvis with 89 mL Isovue 370 IV. Radiation  dose for this scan was reduced using automated exposure control,  adjustment of the mA and/or kV according to patient size, or iterative  reconstruction technique.    COMPARISON: Abdominal ultrasound on same day earlier.    FINDINGS:   Lung bases: Minimal bibasilar atelectasis.    Abdomen/pelvis:    The gallbladder is distended with layering gallstones and  pericholecystic fat stranding and small amount of fluid (series 3  image 57 and series 2 image 28). No suspicious focal hepatic lesion.  No splenomegaly. Small splenule in splenic hilum. No adrenal nodules.  No main pancreatic ductal dilatation. Multiple bilateral cortical  scarring, likely related to prior insult. Subcentimeter hypodense  focus at the lower pole of the left kidney (series 2 image 44), to  small to characterize, likely renal cyst. No radiodense kidney stones  or hydronephrosis.    No abnormally dilated bowel loops. No significant free fluid in the  abdomen or pelvis. No free peritoneal or portal venous gas. The  appendix is visualized and appears normal. Moderate predominantly  aortobiiliac atherosclerotic vascular calcification. No significant  abdominopelvic lymphadenopathy. Colonic diverticulosis without CT  evidence of acute diverticulitis.    Bones and soft tissue: No suspicious osseous lesion.      Impression     "IMPRESSION:  1. Distended gallbladder, layering gallstones with mild  pericholecystic fluid and fat stranding, findings highly suspicious  for acute cholecystitis.  2. Colonic diverticulosis without CT evidence of acute diverticulitis.      MD GIBSON ABRAHAM  Constitutional - Denies fevers, weight loss, malaise, lethargy  Neuro - Denies tremors or seizures  Pulmon - Denies SOB, dyspnea, hemoptysis, chronic cough or use of an inhaler  CV - Denies CP, SOB, lower extremity edema, difficulty w/ stairs, has never used NTG  GI - Denies hematemesis, BRBPR, melena, chronic diarrhea or epigastric pain   - Denies hematuria, difficulty voiding, h/o STDs  Hematology - Denies blood clotting disorders, chronic anemias  Dermatology - No melanomas or skin cancers  Rheumatology - No h/o RA  Pysch - Denies depression, bipolar d/o or schizophrenia    Exam:BP (!) 160/83   Pulse 96   Temp 98  F (36.7  C) (Oral)   Ht 1.702 m (5' 7\")   Wt 82.1 kg (181 lb)   SpO2 97%   BMI 28.35 kg/m      General - Alert and Oriented X4, NAD, well nourished  HEENT - Normocephalic, atraumatic, PERRL, Nose midline  Neck - supple, no LAD  Lungs - Clear to auscultation bilaterally   CV - Heart RRR, no lift's, thrills, murmurs, rubs, or gallops.  Abdomen - Soft, tender to palpation right upper quadrant without guarding, +BS, no hepatosplenomegaly, no palpable masses  Neuro - Full ROM, Strength 5/5 and major muscle groups, sensation intact  Extremities - No cyanosis, clubbing or edema    Assessment and plan: 69-year-old male with acute calculus cholecystitis.  Patient is good candidate for laparoscopic cholecystectomy.  Risks benefits alternatives and comp occasions were discussed with the patient including the possibility of infection bleeding or bile leak.  Patient understood and wished to proceed.  I have started antibiotics in the emergency room.  And these will continue until the gallbladder is out. PATIENT IS CLEARED FOR " SURGERY.    Yehuda Puga MD

## 2019-11-25 ENCOUNTER — TELEPHONE (OUTPATIENT)
Dept: SURGERY | Facility: CLINIC | Age: 69
End: 2019-11-25

## 2019-11-25 VITALS
WEIGHT: 177.91 LBS | SYSTOLIC BLOOD PRESSURE: 151 MMHG | TEMPERATURE: 97.9 F | DIASTOLIC BLOOD PRESSURE: 71 MMHG | HEIGHT: 67 IN | RESPIRATION RATE: 18 BRPM | BODY MASS INDEX: 27.92 KG/M2 | OXYGEN SATURATION: 93 % | HEART RATE: 76 BPM

## 2019-11-25 DIAGNOSIS — K81.9 CHOLECYSTITIS: Primary | ICD-10-CM

## 2019-11-25 LAB
ALBUMIN SERPL-MCNC: 2.5 G/DL (ref 3.4–5)
ALP SERPL-CCNC: 130 U/L (ref 40–150)
ALT SERPL W P-5'-P-CCNC: 76 U/L (ref 0–70)
ANION GAP SERPL CALCULATED.3IONS-SCNC: 4 MMOL/L (ref 3–14)
AST SERPL W P-5'-P-CCNC: 39 U/L (ref 0–45)
BILIRUB SERPL-MCNC: 0.5 MG/DL (ref 0.2–1.3)
BUN SERPL-MCNC: 22 MG/DL (ref 7–30)
CALCIUM SERPL-MCNC: 8.3 MG/DL (ref 8.5–10.1)
CHLORIDE SERPL-SCNC: 101 MMOL/L (ref 94–109)
CO2 SERPL-SCNC: 28 MMOL/L (ref 20–32)
CREAT SERPL-MCNC: 1.04 MG/DL (ref 0.66–1.25)
ERYTHROCYTE [DISTWIDTH] IN BLOOD BY AUTOMATED COUNT: 12.8 % (ref 10–15)
GFR SERPL CREATININE-BSD FRML MDRD: 74 ML/MIN/{1.73_M2}
GLUCOSE SERPL-MCNC: 150 MG/DL (ref 70–99)
HCT VFR BLD AUTO: 35.9 % (ref 40–53)
HGB BLD-MCNC: 12.2 G/DL (ref 13.3–17.7)
MCH RBC QN AUTO: 29.4 PG (ref 26.5–33)
MCHC RBC AUTO-ENTMCNC: 34 G/DL (ref 31.5–36.5)
MCV RBC AUTO: 87 FL (ref 78–100)
PLATELET # BLD AUTO: 189 10E9/L (ref 150–450)
POTASSIUM SERPL-SCNC: 3.7 MMOL/L (ref 3.4–5.3)
PROT SERPL-MCNC: 6.2 G/DL (ref 6.8–8.8)
RBC # BLD AUTO: 4.15 10E12/L (ref 4.4–5.9)
SODIUM SERPL-SCNC: 133 MMOL/L (ref 133–144)
WBC # BLD AUTO: 20.8 10E9/L (ref 4–11)

## 2019-11-25 PROCEDURE — 36415 COLL VENOUS BLD VENIPUNCTURE: CPT | Performed by: SURGERY

## 2019-11-25 PROCEDURE — 25000132 ZZH RX MED GY IP 250 OP 250 PS 637: Performed by: SURGERY

## 2019-11-25 PROCEDURE — 80053 COMPREHEN METABOLIC PANEL: CPT | Performed by: SURGERY

## 2019-11-25 PROCEDURE — 25000128 H RX IP 250 OP 636: Performed by: SURGERY

## 2019-11-25 PROCEDURE — 96376 TX/PRO/DX INJ SAME DRUG ADON: CPT

## 2019-11-25 PROCEDURE — G0378 HOSPITAL OBSERVATION PER HR: HCPCS

## 2019-11-25 PROCEDURE — 85027 COMPLETE CBC AUTOMATED: CPT | Performed by: SURGERY

## 2019-11-25 RX ORDER — CIPROFLOXACIN 500 MG/1
500 TABLET, FILM COATED ORAL 2 TIMES DAILY
Qty: 14 TABLET | Refills: 0 | Status: SHIPPED | OUTPATIENT
Start: 2019-11-25 | End: 2019-12-06

## 2019-11-25 RX ORDER — HYDROCODONE BITARTRATE AND ACETAMINOPHEN 5; 325 MG/1; MG/1
1-2 TABLET ORAL EVERY 6 HOURS PRN
Qty: 20 TABLET | Refills: 0 | Status: SHIPPED | OUTPATIENT
Start: 2019-11-25 | End: 2019-12-02

## 2019-11-25 RX ADMIN — CARVEDILOL 6.25 MG: 6.25 TABLET, FILM COATED ORAL at 08:45

## 2019-11-25 RX ADMIN — OXYCODONE HYDROCHLORIDE 5 MG: 5 TABLET ORAL at 09:44

## 2019-11-25 RX ADMIN — CEFOXITIN SODIUM 2 G: 2 INJECTION, SOLUTION INTRAVENOUS at 03:10

## 2019-11-25 RX ADMIN — OXYCODONE HYDROCHLORIDE 5 MG: 5 TABLET ORAL at 01:18

## 2019-11-25 RX ADMIN — METOCLOPRAMIDE 10 MG: 5 INJECTION, SOLUTION INTRAMUSCULAR; INTRAVENOUS at 03:10

## 2019-11-25 RX ADMIN — CEFOXITIN SODIUM 2 G: 2 INJECTION, SOLUTION INTRAVENOUS at 08:44

## 2019-11-25 RX ADMIN — ZOLPIDEM TARTRATE 5 MG: 5 TABLET, FILM COATED ORAL at 01:18

## 2019-11-25 RX ADMIN — TAMSULOSIN HYDROCHLORIDE 0.4 MG: 0.4 CAPSULE ORAL at 08:45

## 2019-11-25 NOTE — PROGRESS NOTES
[facility] DISCHARGE NOTE    Patient discharged to home at 10:43 AM via wheel chair. Accompanied by spouse and staff. Discharge instructions reviewed with patient and spouse, opportunity offered to ask questions. Prescriptions sent to patients preferred pharmacy. All belongings sent with patient.    Kaya Scott RN

## 2019-11-25 NOTE — TELEPHONE ENCOUNTER
Spoke to patients wife and script was brought to [facility] pharmacy for pt to .  Amee VIDES RN BSN PHN  [facility] Clin[facility]

## 2019-11-25 NOTE — DISCHARGE INSTRUCTIONS
DISCHARGE INSTRUCTIONS     1. You may resume your regular diet when you feel you are ready    2. No heavy lifting (>30lbs)  for 1 month.    3. Empty drain as necessary. No need to keep track of volume of output.    4. Some bruising and mild swelling is normal after surgery. The area below and around the incision(s) may be hard and elevated. This is part of the normal healing process. This will resolve slowly over the next several months.     5. Your wounds are covered with glue. The glue is water tight and so you can shower or bathe immediately following surgery.     6. Use the following medications (in addition to your normal meds) as shown:      Tylenol (acetaminophen) 500 mg every 6 hours as needed for pain.    Do not take more than 1000 mg of Tylenol every 6 hours -OR- 4g in a day    Motrin (ibuprophen) 600 mg every 6 hours as needed for pain. Take with food.     8. Notify Clinic at [phone] if:     Your discomfort is not relieved by your pain medication     You have signs of infection such as temperature above 100.4 degrees orally,  chills, or increasing daily discomfort.     Incision site is becoming more red and/or there is purulent drainage.      9. Follow up with Dr Puga in 2 days.    10.  Due to regulations, I am only allowed prescribe to 3 days of postoperative narcotic pain medication.  Should you require a refill, please call the nurse helpline and you will be accommodated.    11. Most people take the rest of the week off and return to work the following Monday. You may return sooner as pain allows. During your follow-up appointment, the doctor will give you a formal letter for your work with any restrictions detailed.  All disability or other such paperwork will be addressed at that time.

## 2019-11-25 NOTE — PLAN OF CARE
Patient has been unable to void. Bladder scanned for 477 mL. Straight cath yielded 500 mL yellow/orange urine. Pain is managed with 5 mg oxycodone PRN. CARISSA drain emptied of 30 mL bright red fluid. Lap sites CDI. Patient declined Ambien at 2200 but then requested it at 0100. Has been sleeping comfortably most of the night since. IV fluids infusing.

## 2019-11-25 NOTE — TELEPHONE ENCOUNTER
Reason for Call:  Other medication    Detailed comments: Pt has allergy to antibiotic,can't have penicillan, needs something else , call to  Pharmacy  Here,     Phone Number Patient can be reached at: Home number on file [phone] (home)    Best Time: today    Can we leave a detailed message on this number? YES    Call taken on 11/25/2019 at 12:32 PM by Anna Taylor

## 2019-11-25 NOTE — PROGRESS NOTES
POD#1    Tolerating diet. Pain controlled.    I/O last 3 completed shifts:  In: 1776 [P.O.:75; I.V.:1701]  Out: 470 [Urine:300; Drains:150; Blood:20]    Patient Vitals for the past 24 hrs:   BP Temp Temp src Pulse Resp SpO2   11/25/19 0521 124/58 97.5  F (36.4  C) Oral 76 20 94 %   11/25/19 0007 129/67 98.1  F (36.7  C) Oral 86 18 93 %   11/24/19 2015 131/63 98.8  F (37.1  C) Oral 89 18 94 %   11/24/19 1714 -- -- -- -- 16 --   11/24/19 1505 (!) 148/78 97.5  F (36.4  C) Oral 94 16 93 %   11/24/19 1306 (!) 167/75 -- -- -- -- --   11/24/19 1302 (!) 168/81 97.5  F (36.4  C) Oral -- 15 95 %   11/24/19 1245 128/89 -- -- -- 14 --   11/24/19 1237 -- -- -- -- -- 95 %   11/24/19 1230 133/78 -- -- -- 15 95 %   11/24/19 1215 132/69 -- -- -- 10 96 %   11/24/19 1200 124/72 -- -- -- 12 93 %   11/24/19 1145 130/71 -- -- -- 15 98 %   11/24/19 1132 137/71 -- -- -- 10 99 %   11/24/19 1124 136/69 98.5  F (36.9  C) Oral -- 16 99 %   11/24/19 0954 -- -- -- -- 18 --   11/24/19 0832 -- -- -- -- 16 --   11/24/19 0753 136/68 99.3  F (37.4  C) Oral 98 16 92 %   11/24/19 0738 -- -- -- -- 16 --     Exam:  AXO3 NAD  Neuro - Strength 5/5 all major groups, sensation intact, PERRL  Lungs - CTA  CV - RRR  Abd - Soft, non-distended, non-tender, +BS, CARISSA with sero-sanguinous drainage  Extr - No edema    A/P: s/p lap ralph doing well.  Home today on oral abx.  Will remove CARISSA in clinic on Wednesday.    Yehuda Puga MD

## 2019-11-25 NOTE — DISCHARGE SUMMARY
Physician Discharge Summary     Patient ID:  Arley Reynoldsr  [id number]  69 year old  1950    Admit date: 11/23/2019    Discharge date and time: 11/25/2019     Admitting Physician: Yuriy Yeh MD     Discharge Physician: Edd SIDHU    Admission Diagnoses: Acute cholecystitis [K81.0]  Abdominal pain, right upper quadrant [R10.11]    Discharge Diagnoses: Acute cholecystitis    Admission Condition: fair    Discharged Condition: good    Indication for Admission: RUQ abd pain    Hospital Course: Admitted for Iv abx and taken to OR the following day.  Kept overnight for more IV abx and discharged the following day.    Consults: none    Significant Diagnostic Studies: none    Treatments: surgery: lap ralph    Discharge Exam:  See daily progress notes    Disposition: home    Patient Instructions:   Current Discharge Medication List      START taking these medications    Details   amoxicillin-clavulanate (AUGMENTIN) 875-125 MG tablet Take 1 tablet by mouth 2 times daily  Qty: 14 tablet, Refills: 0    Associated Diagnoses: Abdominal pain, right upper quadrant      HYDROcodone-acetaminophen (NORCO) 5-325 MG tablet Take 1-2 tablets by mouth every 6 hours as needed for pain  Qty: 20 tablet, Refills: 0    Associated Diagnoses: Postoperative pain         CONTINUE these medications which have NOT CHANGED    Details   carvedilol (COREG) 6.25 MG tablet Take 6.25 mg by mouth 2 times daily (with meals)      hydrochlorothiazide (HYDRODIURIL) 25 MG tablet Take 1 tablet (25 mg) by mouth daily  Qty: 90 tablet, Refills: 3    Associated Diagnoses: Essential hypertension      lidocaine (XYLOCAINE) 2 % solution Swish and swallow 10 mLs in mouth every 3 hours as needed for moderate pain ; Max 8 doses/24 hour period.  Qty: 100 mL, Refills: 1         STOP taking these medications       omeprazole (PRILOSEC) 40 MG DR capsule Comments:   Reason for Stopping:             Activity: no heavy lifting for 4 weeks  Diet: regular diet  Wound Care:  keep wound clean and dry    Follow-up with Dr Puga in 2 days.    Signed:  Yehuda Puga MD  11/25/2019  6:59 AM

## 2019-11-27 ENCOUNTER — OFFICE VISIT (OUTPATIENT)
Dept: SURGERY | Facility: CLINIC | Age: 69
End: 2019-11-27
Payer: COMMERCIAL

## 2019-11-27 VITALS
DIASTOLIC BLOOD PRESSURE: 79 MMHG | HEIGHT: 67 IN | SYSTOLIC BLOOD PRESSURE: 125 MMHG | TEMPERATURE: 97.8 F | BODY MASS INDEX: 27.92 KG/M2 | HEART RATE: 90 BPM | WEIGHT: 177.91 LBS

## 2019-11-27 DIAGNOSIS — Z09 POSTOP CHECK: Primary | ICD-10-CM

## 2019-11-27 LAB — COPATH REPORT: NORMAL

## 2019-11-27 PROCEDURE — 99024 POSTOP FOLLOW-UP VISIT: CPT | Performed by: SURGERY

## 2019-11-27 ASSESSMENT — MIFFLIN-ST. JEOR: SCORE: 1540.63

## 2019-11-27 NOTE — PROGRESS NOTES
"No complaints.  Pain controlled with oral pain meds.    /79 (BP Location: Right arm, Patient Position: Sitting, Cuff Size: Adult Regular)   Pulse 90   Temp 97.8  F (36.6  C) (Tympanic)   Ht 1.702 m (5' 7\")   Wt 80.7 kg (177 lb 14.6 oz)   BMI 27.86 kg/m      Exam  [id number]  CTAB  RRR  S&NTND+BS, wounds - cdi s erythema  No CCE    A/P s/p lap ralph healing well.  No heavy lifting for 2 weeks. CARISSA pulled.  RTC prn.    Yehuda Puga MD   "

## 2019-11-27 NOTE — NURSING NOTE
"Initial /79 (BP Location: Right arm, Patient Position: Sitting, Cuff Size: Adult Regular)   Pulse 90   Temp 97.8  F (36.6  C) (Tympanic)   Ht 1.702 m (5' 7\")   Wt 80.7 kg (177 lb 14.6 oz)   BMI 27.86 kg/m   Estimated body mass index is 27.86 kg/m  as calculated from the following:    Height as of this encounter: 1.702 m (5' 7\").    Weight as of this encounter: 80.7 kg (177 lb 14.6 oz). .    Katie Black MA    "

## 2019-11-27 NOTE — LETTER
"    11/27/2019         RE: Arley Lopez  [address]  [place] [address]        Dear Colleague,    Thank you for referring your patient, Arley Lopez, to the [facility]. Please see a copy of my visit note below.    No complaints.  Pain controlled with oral pain meds.    /79 (BP Location: Right arm, Patient Position: Sitting, Cuff Size: Adult Regular)   Pulse 90   Temp 97.8  F (36.6  C) (Tympanic)   Ht 1.702 m (5' 7\")   Wt 80.7 kg (177 lb 14.6 oz)   BMI 27.86 kg/m       Exam  [id number]  CTAB  RRR  S&NTND+BS, wounds - cdi s erythema  No CCE    A/P s/p lap ralph healing well.  No heavy lifting for 2 weeks. CARISSA pulled.  RTC prn.    Yehuda Puga MD     Again, thank you for allowing me to participate in the care of your patient.        Sincerely,        Yehuda Puga MD    "

## 2019-12-02 DIAGNOSIS — G89.18 POSTOPERATIVE PAIN: ICD-10-CM

## 2019-12-02 RX ORDER — HYDROCODONE BITARTRATE AND ACETAMINOPHEN 5; 325 MG/1; MG/1
1-2 TABLET ORAL EVERY 6 HOURS PRN
Qty: 20 TABLET | Refills: 0 | Status: SHIPPED | OUTPATIENT
Start: 2019-12-02 | End: 2020-01-06

## 2019-12-02 NOTE — TELEPHONE ENCOUNTER
Reason for call:  Medication   If this is a refill request, has the caller requested the refill from the pharmacy already? N/A  Will the patient be using a [facility]? No  Name of the pharmacy and phone number for the current request: [facility] [address] [place] [address], [address]    Name of the medication requested: HYDROcodone-acetaminophen (NORCO) 5-325 MG tablet & ciprofloxacin (CIPRO) 500 MG tablet    Other request:     Phone number to reach patient:  Cell number on file:    No relevant phone numbers on file.       Best Time:  any    Can we leave a detailed message on this number?  YES

## 2019-12-02 NOTE — TELEPHONE ENCOUNTER
"Called patient since he was also requesting refill on abx. Stated he wasn't sure if he needed it any longer. No current signs of infection. No fever, increasing pain, redness or drainage.     Pain is tolerable. Occasional \"shooting\" pain where drain had been. Taking 1 Norco TID. Wondering if he can get refill.     Thank you,   Kyleigh PIERSON RN   Specialty Clinics   "

## 2019-12-06 ENCOUNTER — OFFICE VISIT (OUTPATIENT)
Dept: FAMILY MEDICINE | Facility: CLINIC | Age: 69
End: 2019-12-06
Payer: COMMERCIAL

## 2019-12-06 VITALS
HEART RATE: 80 BPM | HEIGHT: 67 IN | DIASTOLIC BLOOD PRESSURE: 90 MMHG | WEIGHT: 174 LBS | BODY MASS INDEX: 27.31 KG/M2 | SYSTOLIC BLOOD PRESSURE: 142 MMHG | OXYGEN SATURATION: 99 %

## 2019-12-06 DIAGNOSIS — Z90.49 HISTORY OF LAPAROSCOPIC CHOLECYSTECTOMY: ICD-10-CM

## 2019-12-06 DIAGNOSIS — I10 ESSENTIAL HYPERTENSION: Primary | ICD-10-CM

## 2019-12-06 DIAGNOSIS — M25.512 CHRONIC LEFT SHOULDER PAIN: ICD-10-CM

## 2019-12-06 DIAGNOSIS — G89.29 CHRONIC LEFT SHOULDER PAIN: ICD-10-CM

## 2019-12-06 PROCEDURE — 99214 OFFICE O/P EST MOD 30 MIN: CPT | Performed by: FAMILY MEDICINE

## 2019-12-06 RX ORDER — CARVEDILOL 12.5 MG/1
12.5 TABLET ORAL 2 TIMES DAILY WITH MEALS
Qty: 60 TABLET | Refills: 3 | Status: SHIPPED | OUTPATIENT
Start: 2019-12-06 | End: 2020-01-06

## 2019-12-06 ASSESSMENT — ENCOUNTER SYMPTOMS
PALPITATIONS: 0
HEADACHES: 0
SHORTNESS OF BREATH: 0
PARESTHESIAS: 0
MYALGIAS: 0
DIZZINESS: 0
ARTHRALGIAS: 1
WEAKNESS: 0
FEVER: 0
JOINT SWELLING: 0
CHILLS: 0
NERVOUS/ANXIOUS: 0
SORE THROAT: 0
COUGH: 0

## 2019-12-06 ASSESSMENT — MIFFLIN-ST. JEOR: SCORE: 1522.89

## 2019-12-06 NOTE — PATIENT INSTRUCTIONS
Jack Tubbs,    Thank you for allowing [facility] to manage your care.    I sent your prescriptions to your pharmacy.    For your high blood pressure, I am increasing your carvedilol (coreg) to 12.5 mg twice day.     Please check your blood pressure twice a week.     I made an orthopedic surgery referral, they will be in 1-2 weeks to set up your appointment.  If you do not hear from them, please call the specialty number on your after visit.     If you have any questions or concerns, please feel free to call us at [phone].    Crys Mace to you and your family!    Sincerely,    Dr. Rice    Did you know?  You can schedule an e-Visit for certain simple non-emergent issue for your convenience.  To learn more about or start an eVisit, simply login to [employer], click  Visits  on top banner, click  Start a Virtual Visit  drop down, and click  Symptom-Specific E-Visit

## 2019-12-06 NOTE — PROGRESS NOTES
Subjective     Arley Lopez is a 69 year old male who presents to clinic today for the following health issues:    HPI     Hypertension Follow-up    Do you check your blood pressure regularly outside of the clinic? No     Are you following a low salt diet? No    Are your blood pressures ever more than 140 on the top number (systolic) OR more   than 90 on the bottom number (diastolic), for example 140/90? No    BP Readings from Last 6 Encounters:   19 (!) 146/92   19 125/79   19 (!) 151/71   19 (!) 154/78   19 138/78   19 157/80       Patient Active Problem List   Diagnosis     GERD (gastroesophageal reflux disease)     Essential hypertension     CARDIOVASCULAR SCREENING; LDL GOAL LESS THAN 130     Lactose intolerance     Irritable bowel syndrome with diarrhea     Acute calculous cholecystitis     Acute cholecystitis     Past Surgical History:   Procedure Laterality Date     COLONOSCOPY  May 2012    abnormal !     ENDOSCOPY  01/10/2011    Reflux duodenum, Hiatal hernia small, otherwise normal exam.     FOOT SURGERY Left      GENITOURINARY SURGERY      vasectomy     GENITOURINARY SURGERY      spermacele     LAPAROSCOPIC CHOLECYSTECTOMY N/A 2019    Procedure: CHOLECYSTECTOMY, LAPAROSCOPIC;  Surgeon: Yeuhda Puga MD;  Location: [facility] OR       Social History     Tobacco Use     Smoking status: Former Smoker     Packs/day: 2.00     Years: 2.00     Pack years: 4.00     Last attempt to quit: 1992     Years since quittin.9     Smokeless tobacco: Never Used   Substance Use Topics     Alcohol use: Yes     Comment: beer per monthly     Family History   Problem Relation Age of Onset     Cancer Mother      Rheumatic fever Mother      Suicide Father          Current Outpatient Medications   Medication Sig Dispense Refill     carvedilol (COREG) 6.25 MG tablet Take 6.25 mg by mouth 2 times daily (with meals)       hydrochlorothiazide (HYDRODIURIL) 25 MG tablet Take 1 tablet (25 mg)  "by mouth daily 90 tablet 3     HYDROcodone-acetaminophen (NORCO) 5-325 MG tablet Take 1-2 tablets by mouth every 6 hours as needed for pain 20 tablet 0     Allergies   Allergen Reactions     Penicillin G Hives and Swelling     1. Hypertension f/u: Patient is currently on coreg and hydrochlorothiazide.  Patient s/p lap cholecystectomy on 11/23.  He continues to take pain medication.  Patient is tolerating his diet (he is tolerating pizza and hamburger).  Patient does not check his blood pressures.  No chest pain or SOB.     2. Chronic left shoulder pain: He states that he would like a referral [facility].    Review of Systems   Constitutional: Negative for chills and fever.   HENT: Negative for congestion, ear pain, hearing loss and sore throat.    Respiratory: Negative for cough and shortness of breath.    Cardiovascular: Negative for chest pain, palpitations and peripheral edema.   Musculoskeletal: Positive for arthralgias (left shoulder). Negative for joint swelling and myalgias.   Skin: Negative for rash.   Neurological: Negative for dizziness, weakness, headaches and paresthesias.   Psychiatric/Behavioral: Negative for mood changes. The patient is not nervous/anxious.           Objective    BP (!) 146/92   Pulse 80   Ht 1.702 m (5' 7\")   Wt 78.9 kg (174 lb)   SpO2 99%   BMI 27.25 kg/m    Body mass index is 27.25 kg/m .  Physical Exam  Constitutional:       General: He is not in acute distress.  HENT:      Head: Normocephalic and atraumatic.   Eyes:      Conjunctiva/sclera: Conjunctivae normal.   Cardiovascular:      Rate and Rhythm: Normal rate and regular rhythm.      Heart sounds: Normal heart sounds. No murmur.   Pulmonary:      Effort: Pulmonary effort is normal. No respiratory distress.      Breath sounds: No wheezing or rales.   Abdominal:      General: There is no distension.      Palpations: Abdomen is soft.      Tenderness: There is no abdominal tenderness. There is no guarding.      " Comments: Well healed laparoscopic surgical scar   Musculoskeletal: Normal range of motion.   Skin:     Findings: No rash.   Neurological:      Mental Status: He is alert and oriented to person, place, and time.       Assessment & Plan     1. Essential hypertension  Continue with hydrochlorothiazide.   - carvedilol (COREG) 12.5 MG tablet; Take 1 tablet (12.5 mg) by mouth 2 times daily (with meals)  Dispense: 60 tablet; Refill: 3    2. Chronic left shoulder pain  Concerning for rotator cuff pathology  - ORTHOPEDICS ADULT REFERRAL    3. History of laparoscopic cholecystectomy  S/P 11/23.  Advised to decrease the fatty foods.  Otherwise, healing well. Continue with pain medication as needed.     I spent 25 minutes with patient of which 50% was spent counseling and coordinating patient's care as well as discussing patient's plan of care.     See Patient Instructions    Return in about 1 month (around 1/6/2020) for High blood pressure f/u.    Daniel Rice DO  [facility]

## 2020-01-03 NOTE — PROGRESS NOTES
Subjective     Arley Lopez is a 69 year old male who presents to clinic today for the following health issues:    HPI   Hypertension Follow-up      Do you check your blood pressure regularly outside of the clinic? Yes     Are you following a low salt diet? No    Are your blood pressures ever more than 140 on the top number (systolic) OR more   than 90 on the bottom number (diastolic), for example 140/90? Yes      How many servings of fruits and vegetables do you eat daily?  0-1    On average, how many sweetened beverages do you drink each day (Examples: soda, juice, sweet tea, etc.  Do NOT count diet or artificially sweetened beverages)?   1    How many days per week do you miss taking your medication? 0    Patient Active Problem List   Diagnosis     GERD (gastroesophageal reflux disease)     Essential hypertension     CARDIOVASCULAR SCREENING; LDL GOAL LESS THAN 130     Lactose intolerance     Irritable bowel syndrome with diarrhea     Acute calculous cholecystitis     Acute cholecystitis     Past Surgical History:   Procedure Laterality Date     COLONOSCOPY  May 2012    abnormal !     ENDOSCOPY  01/10/2011    Reflux duodenum, Hiatal hernia small, otherwise normal exam.     FOOT SURGERY Left      GENITOURINARY SURGERY      vasectomy     GENITOURINARY SURGERY      spermacele     LAPAROSCOPIC CHOLECYSTECTOMY N/A 2019    Procedure: CHOLECYSTECTOMY, LAPAROSCOPIC;  Surgeon: Yehuda Puga MD;  Location: [facility] OR       Social History     Tobacco Use     Smoking status: Former Smoker     Packs/day: 2.00     Years: 2.00     Pack years: 4.00     Last attempt to quit: 1992     Years since quittin.0     Smokeless tobacco: Never Used   Substance Use Topics     Alcohol use: Yes     Comment: beer per monthly     Family History   Problem Relation Age of Onset     Cancer Mother      Rheumatic fever Mother      Suicide Father          Current Outpatient Medications   Medication Sig Dispense Refill     carvedilol  (COREG) 12.5 MG tablet Take 1 tablet (12.5 mg) by mouth 2 times daily (with meals) 60 tablet 3     hydrochlorothiazide (HYDRODIURIL) 25 MG tablet Take 1 tablet (25 mg) by mouth daily 90 tablet 3     omeprazole (PRILOSEC) 20 MG DR capsule Take 20 mg by mouth       Allergies   Allergen Reactions     Penicillin G Hives and Swelling     1. Hypertension f/u: States that blood pressure are ranging 140-150/80-90s.  Patient does not have a strict diet.  He denies any chest pain or SOB.     2. GERD: Currently on omeprazole.  Feels like his symptoms have returned.  Associated with certain foods.     3. Epigastric pain: S/P cholecystectomy.  States of pain 10 days at incision site for couple minutes.  States of intermittent.    Review of Systems   Constitutional: Negative for chills and fever.   HENT: Negative for congestion, ear pain, hearing loss and sore throat.    Respiratory: Negative for cough and shortness of breath.    Cardiovascular: Negative for chest pain, palpitations and peripheral edema.   Gastrointestinal: Positive for abdominal pain (intermittent pain at top abdominal surgical site).        Intermittent heartburn symptoms   Musculoskeletal: Negative for arthralgias, joint swelling and myalgias.   Skin: Negative for rash.   Neurological: Negative for dizziness, weakness, headaches and paresthesias.   Psychiatric/Behavioral: Negative for mood changes. The patient is not nervous/anxious.             Objective    BP (!) 154/86 (BP Location: Left arm, Cuff Size: Adult Large)   Pulse 85   Temp 98.4  F (36.9  C) (Tympanic)   Wt 80.5 kg (177 lb 6.4 oz)   SpO2 98%   BMI 27.78 kg/m    Body mass index is 27.78 kg/m .  Physical Exam  Constitutional:       General: He is not in acute distress.     Appearance: He is well-developed.   HENT:      Head: Normocephalic and atraumatic.      Nose: Nose normal.   Eyes:      Conjunctiva/sclera: Conjunctivae normal.   Neck:      Musculoskeletal: Normal range of motion.       Trachea: No tracheal deviation.   Cardiovascular:      Rate and Rhythm: Normal rate and regular rhythm.      Heart sounds: Normal heart sounds.   Pulmonary:      Effort: Pulmonary effort is normal.      Breath sounds: No wheezing.   Abdominal:      Comments: Abdominal surgical site: no hernia, no erythema, no masses appreciated   Musculoskeletal: Normal range of motion.   Skin:     Findings: No erythema or rash.   Neurological:      Mental Status: He is alert and oriented to person, place, and time.   Psychiatric:         Behavior: Behavior normal.          Assessment & Plan     1. Essential hypertension  Will increase coreg to 25 mg daily.  Continue with HCTZ  - carvedilol (COREG) 25 MG tablet; Take 1 tablet (25 mg) by mouth 2 times daily (with meals)  Dispense: 60 tablet; Refill: 1    2. Gastroesophageal reflux disease without esophagitis  Advised to avoid foods or drinks which contain citrus (tomato, pineapple, lime, lemon, etc), caffeine, chocolate, and spicy foods.  Avoid eating late at night.   - omeprazole (PRILOSEC) 20 MG DR capsule; Take 1 capsule (20 mg) by mouth daily  Dispense: 90 capsule; Refill: 3    See Patient Instructions    No follow-ups on file.    aDniel Rice [facility]

## 2020-01-06 ENCOUNTER — OFFICE VISIT (OUTPATIENT)
Dept: FAMILY MEDICINE | Facility: CLINIC | Age: 70
End: 2020-01-06
Payer: COMMERCIAL

## 2020-01-06 VITALS
TEMPERATURE: 98.4 F | HEART RATE: 85 BPM | WEIGHT: 177.4 LBS | DIASTOLIC BLOOD PRESSURE: 86 MMHG | SYSTOLIC BLOOD PRESSURE: 154 MMHG | OXYGEN SATURATION: 98 % | BODY MASS INDEX: 27.78 KG/M2

## 2020-01-06 DIAGNOSIS — I10 ESSENTIAL HYPERTENSION: Primary | ICD-10-CM

## 2020-01-06 DIAGNOSIS — K21.9 GASTROESOPHAGEAL REFLUX DISEASE WITHOUT ESOPHAGITIS: ICD-10-CM

## 2020-01-06 PROCEDURE — 99214 OFFICE O/P EST MOD 30 MIN: CPT | Performed by: FAMILY MEDICINE

## 2020-01-06 RX ORDER — CARVEDILOL 25 MG/1
25 TABLET ORAL 2 TIMES DAILY WITH MEALS
Qty: 60 TABLET | Refills: 1 | Status: SHIPPED | OUTPATIENT
Start: 2020-01-06 | End: 2020-02-05

## 2020-01-06 ASSESSMENT — ENCOUNTER SYMPTOMS
MYALGIAS: 0
SORE THROAT: 0
FEVER: 0
CHILLS: 0
ABDOMINAL PAIN: 1
WEAKNESS: 0
ARTHRALGIAS: 0
SHORTNESS OF BREATH: 0
DIZZINESS: 0
COUGH: 0
PARESTHESIAS: 0
NERVOUS/ANXIOUS: 0
PALPITATIONS: 0
HEADACHES: 0
JOINT SWELLING: 0

## 2020-01-06 NOTE — NURSING NOTE
"Initial BP (!) 154/86 (BP Location: Left arm, Cuff Size: Adult Large)   Pulse 85   Temp 98.4  F (36.9  C) (Tympanic)   Wt 80.5 kg (177 lb 6.4 oz)   SpO2 98%   BMI 27.78 kg/m   Estimated body mass index is 27.78 kg/m  as calculated from the following:    Height as of 12/6/19: 1.702 m (5' 7\").    Weight as of this encounter: 80.5 kg (177 lb 6.4 oz). .    "

## 2020-01-06 NOTE — PATIENT INSTRUCTIONS
Jack Tubbs,    Thank you for allowing [facility] to manage your care.    I sent your prescriptions to your pharmacy.    For your high blood pressure, I am increasing your metoprolol to 25 mg twice a day.     Please decrease your salt intake.     If you have any questions or concerns, please feel free to call us at [phone].    Sincerely,    Dr. Rice    Did you know?  You can schedule an e-Visit for certain simple non-emergent issue for your convenience.  To learn more about or start an eVisit, simply login to [employer], click  Visits  on top banner, click  Start a Virtual Visit  drop down, and click  Symptom-Specific E-Visit

## 2020-02-03 ENCOUNTER — TELEPHONE (OUTPATIENT)
Dept: FAMILY MEDICINE | Facility: CLINIC | Age: 70
End: 2020-02-03

## 2020-02-03 DIAGNOSIS — K21.9 GASTROESOPHAGEAL REFLUX DISEASE WITHOUT ESOPHAGITIS: ICD-10-CM

## 2020-02-03 NOTE — TELEPHONE ENCOUNTER
Spoke with patient  , he is taking prolisec 20 mg BID   Needs new Rx to cover it  New Rx sent  Also he has had a recent change in BP meds    Reading   150/85 139/87 142/78 142/86  Advised BP looking good,    Has appt for f/u with Kristopher due to singer out of office   Review chart pt has elevated glucose 150 in Nove 2019  No a1c done   Discussed Anette may do one on wed, at appt   Advised to keep appt   JUDIT Johnson  Clinic  RN/Madhu Langston

## 2020-02-03 NOTE — TELEPHONE ENCOUNTER
Pt is calling to see if he can talk to a nurse yet,  Please call to advise re his BP readings.     Albina Cyr, [place]

## 2020-02-03 NOTE — TELEPHONE ENCOUNTER
Patient says that he takes 2 Prilosec per day not one.  Dr. Rice renewed it with him only taking 1 per day, so he will need a new RX sent to the pharmacy  Please.  Pharmacy selected.  He would also like a nurse to call him about BP meds.    Sena Herndon [facility]

## 2020-02-05 ENCOUNTER — OFFICE VISIT (OUTPATIENT)
Dept: FAMILY MEDICINE | Facility: CLINIC | Age: 70
End: 2020-02-05
Payer: COMMERCIAL

## 2020-02-05 VITALS
BODY MASS INDEX: 28.47 KG/M2 | SYSTOLIC BLOOD PRESSURE: 150 MMHG | TEMPERATURE: 97.4 F | HEIGHT: 67 IN | RESPIRATION RATE: 16 BRPM | DIASTOLIC BLOOD PRESSURE: 78 MMHG | HEART RATE: 82 BPM | WEIGHT: 181.38 LBS

## 2020-02-05 DIAGNOSIS — K81.0 ACUTE CHOLECYSTITIS: ICD-10-CM

## 2020-02-05 DIAGNOSIS — K21.9 GASTROESOPHAGEAL REFLUX DISEASE WITHOUT ESOPHAGITIS: ICD-10-CM

## 2020-02-05 DIAGNOSIS — I10 ESSENTIAL HYPERTENSION: Primary | ICD-10-CM

## 2020-02-05 DIAGNOSIS — T14.8XXA SPLINTER IN SKIN: ICD-10-CM

## 2020-02-05 DIAGNOSIS — R73.09 ELEVATED GLUCOSE: ICD-10-CM

## 2020-02-05 PROBLEM — K80.00 ACUTE CALCULOUS CHOLECYSTITIS: Status: RESOLVED | Noted: 2019-11-23 | Resolved: 2020-02-05

## 2020-02-05 LAB — HBA1C MFR BLD: 5.4 % (ref 0–5.6)

## 2020-02-05 PROCEDURE — 99214 OFFICE O/P EST MOD 30 MIN: CPT | Performed by: FAMILY MEDICINE

## 2020-02-05 PROCEDURE — 83036 HEMOGLOBIN GLYCOSYLATED A1C: CPT | Performed by: FAMILY MEDICINE

## 2020-02-05 PROCEDURE — 36415 COLL VENOUS BLD VENIPUNCTURE: CPT | Performed by: FAMILY MEDICINE

## 2020-02-05 RX ORDER — METOPROLOL SUCCINATE 50 MG/1
50 TABLET, EXTENDED RELEASE ORAL DAILY
Qty: 30 TABLET | Refills: 1 | Status: SHIPPED | OUTPATIENT
Start: 2020-02-05 | End: 2020-03-11

## 2020-02-05 RX ORDER — HYDROCHLOROTHIAZIDE 12.5 MG/1
25 TABLET ORAL DAILY
Qty: 30 TABLET | Refills: 1 | Status: SHIPPED | OUTPATIENT
Start: 2020-02-05 | End: 2020-03-11

## 2020-02-05 RX ORDER — AMLODIPINE BESYLATE 5 MG/1
5 TABLET ORAL DAILY
Qty: 30 TABLET | Refills: 1 | Status: SHIPPED | OUTPATIENT
Start: 2020-02-05 | End: 2020-03-11

## 2020-02-05 ASSESSMENT — MIFFLIN-ST. JEOR: SCORE: 1556.34

## 2020-02-05 ASSESSMENT — PAIN SCALES - GENERAL: PAINLEVEL: NO PAIN (0)

## 2020-02-05 NOTE — PROGRESS NOTES
Subjective     Arley Lopez is a 69 year old male who presents to clinic today for the following health issues:    HPI     Hypertension Follow-up  Coreg 25mg twice daily, hydrochlorothiazide 25mg every day     Do you check your blood pressure regularly outside of the clinic? Yes     Are you following a low salt diet? Yes    Are your blood pressures ever more than 140 on the top number (systolic) OR more   than 90 on the bottom number (diastolic), for example 140/90? Yes    BP Readings from Last 6 Encounters:   02/05/20 (!) 158/80   01/06/20 (!) 154/86   12/06/19 (!) 142/90   11/27/19 125/79   11/25/19 (!) 151/71   11/23/19 (!) 154/78     - Sliver in right thumb.      How many servings of fruits and vegetables do you eat daily?  2-3    On average, how many sweetened beverages do you drink each day (Examples: soda, juice, sweet tea, etc.  Do NOT count diet or artificially sweetened beverages)?   0    How many days per week do you exercise enough to make your heart beat faster? None outside of when at work    How many minutes a day do you exercise enough to make your heart beat faster? N/A  How many days per week do you miss taking your medication? 0    What makes it hard for you to take your medications?  side effects      Reviewed and updated as needed this visit by Provider         Review of Systems   ROS COMP: CONSTITUTIONAL:NEGATIVE for fever, chills, change in weight  INTEGUMENTARY/SKIN: POSITIVE for splinter right thumb  RESP:NEGATIVE for significant cough or SOB  CV: NEGATIVE for chest pain, palpitations or peripheral edema  MUSCULOSKELETAL: NEGATIVE for significant arthralgias or myalgia  NEURO: NEGATIVE for weakness, dizziness or paresthesias  PSYCHIATRIC: NEGATIVE for changes in mood or affect    This document serves as a record of the services and decisions personally performed and made by Heather Summers MD. It was created on his behalf by Curtis Eagle, a trained medical scribe. The creation of this  "document is based the provider's statements to the medical scribe.  Curtis Eagle 8:52 AM February 5, 2020        Objective    BP (!) 158/80   Pulse 82   Temp 97.4  F (36.3  C) (Tympanic)   Resp 16   Ht 1.702 m (5' 7\")   Wt 82.3 kg (181 lb 6 oz)   BMI 28.41 kg/m    Body mass index is 28.41 kg/m .  Physical Exam   GENERAL: healthy, alert and no distress  RESP: lungs clear to auscultation - no rales, rhonchi or wheezes  CV: regular rate and rhythm, normal S1 S2  MS: no gross musculoskeletal defects noted, no edema  SKIN: Small area of hyperkeratotic ptotic tissue, 2 mm in diameter mid pad right thumb  NEURO: mentation intact and speech normal  PSYCH: mentation appears normal, affect normal/bright    Diagnostic Test Results:    Results for orders placed or performed in visit on 02/05/20   Hemoglobin A1c     Status: None   Result Value Ref Range    Hemoglobin A1C 5.4 0 - 5.6 %             Assessment & Plan     (I10) Essential hypertension  (primary encounter diagnosis)  Comment: Blood pressure elevated, does note some side effects from either increased dose of hydrochlorothiazide or carvedilol.  Will attempt to simplify regimen to once daily using calcium channel blocker, thiazide diuretic and beta-blocker.  Plan: amLODIPine (NORVASC) 5 MG tablet,         hydrochlorothiazide (HYDRODIURIL) 12.5 MG         tablet, metoprolol succinate ER (TOPROL-XL) 50         MG 24 hr tablet        Follow-up in 1 month.    (R73.09) Elevated glucose  Comment: A1c 5.4, normal. No signs of diabetes.   Plan: Hemoglobin A1c        Monitor.    (K21.9) Gastroesophageal reflux disease without esophagitis  Comment: Controlled with using PPI therapy. Denies any recent flare ups.  Appears to need daily PPI therapy.  Plan: omeprazole (PRILOSEC) 20 MG DR capsule        Continue medication. Refill  X 1 year.    (K81.0) Acute cholecystitis  Comment: Resolved, s/p cholecystectomy 11/23/2019      (T14.8XXA) Splinter in skin  Comment: The area in " "question was explored using sharp dissection, no foreign body noted.  Reviewed options, discussed I would have to use lidocaine was a ring block and extend the incision.  Plan: Patient like to monitor, advised that his body should use a walled off or absorb the wood splinter.  Call with any concerns or questions.          Patient Instructions   *   Let's readjust your blood pressure medications.     *   The new list will be:    -hydrochlorothiazide 12.5 mg per day.  -metoprolol 50 mg daily.   -amlodipine 5 mg daily.     *    Stay on the Prilosec 20 mg daily.     *    Keep us updated with your blood pressure.     *    Will check blood sugars today: a1c.     *    Follow up in one month.        BMI:   Estimated body mass index is 28.41 kg/m  as calculated from the following:    Height as of this encounter: 1.702 m (5' 7\").    Weight as of this encounter: 82.3 kg (181 lb 6 oz).   Weight management plan: Discussed healthy diet and exercise guidelines            No follow-ups on file.    The information in this document, created by a scribe for me, accurately reflects the services I personally performed and the decisions made by me. I have reviewed and approved this document for accuracy.     Heather Summers MD  [facility]        "

## 2020-02-05 NOTE — LETTER
February 10, 2020      Arley HURST Jessica  [address] [facility] [address]        Dear ,    We are writing to inform you of your test results.      Your recent blood test, hemoglobin A1c, shows there is no sign of diabetes.  This measures your average blood sugars over 3 months.  Good news.  Please call with any questions or concerns.      Resulted Orders   Hemoglobin A1c   Result Value Ref Range    Hemoglobin A1C 5.4 0 - 5.6 %      Comment:      Normal <5.7% Prediabetes 5.7-6.4%  Diabetes 6.5% or higher - adopted from ADA   consensus guidelines.         If you have any questions or concerns, please call the clinic at the number listed above.       Sincerely,        Heather Summers MD/am

## 2020-02-05 NOTE — PATIENT INSTRUCTIONS
*   Let's readjust your blood pressure medications.     *   The new list will be:    -hydrochlorothiazide 12.5 mg per day.  -metoprolol 50 mg daily.   -amlodipine 5 mg daily.     *    Stay on the Prilosec 20 mg daily.     *    Keep us updated with your blood pressure.     *    Will check blood sugars today: a1c.     *    Follow up in one month.

## 2020-02-07 ENCOUNTER — TELEPHONE (OUTPATIENT)
Dept: FAMILY MEDICINE | Facility: CLINIC | Age: 70
End: 2020-02-07

## 2020-02-07 NOTE — TELEPHONE ENCOUNTER
Reason for Call:  Other     Detailed comments: pt had a A1C done on 2/5/2020 and is awaiting results from that, and would like a call back if possible today re: pre-diabetes etc.    Phone Number Patient can be reached at: Home number on file [phone] (home)    Best Time: any    Can we leave a detailed message on this number? YES    Call taken on 2/7/2020 at 2:29 PM by Joyce Herrera

## 2020-02-07 NOTE — TELEPHONE ENCOUNTER
Called and spoke with patient  Reviewed recent A1c per Kristopher note  Discussed ways to hopefully prevent or delay diabetes,  Pt will try   Diet change inc activity  Less sugar   Will call or RTC as need  [facility]  RN/Madhu Langston          2/5/2020  (R73.09) Elevated glucose  Comment: A1c 5.4, normal. No signs of diabetes.   Plan: Hemoglobin A1c        Monitor.

## 2020-03-04 ENCOUNTER — TELEPHONE (OUTPATIENT)
Dept: FAMILY MEDICINE | Facility: CLINIC | Age: 70
End: 2020-03-04

## 2020-03-04 NOTE — TELEPHONE ENCOUNTER
Pt is calling because he needs refill on his BP med's, but it is also causing him to have bad diarrhea, please call him back to talk about the diarrhea.     Albina Cyr, [place]

## 2020-03-04 NOTE — TELEPHONE ENCOUNTER
Spoke with patient  He is having problems with  Diarrhea, he has a long history of condition but now thinks it may be new BP medications  discussed review of chart pt has history of diverticulosis  and  Was not aware Dx on last colonoscopy     currently having lots gas, urgency,  explosive diarrhea and incontinence of stool  denies temp or abd pain    Advised appt made for next week   May try OTC anti diarrheas till then  JUDIT Deluca RN/Madhu Langston

## 2020-03-11 ENCOUNTER — OFFICE VISIT (OUTPATIENT)
Dept: FAMILY MEDICINE | Facility: CLINIC | Age: 70
End: 2020-03-11
Payer: COMMERCIAL

## 2020-03-11 VITALS
SYSTOLIC BLOOD PRESSURE: 132 MMHG | TEMPERATURE: 98.1 F | WEIGHT: 182.38 LBS | BODY MASS INDEX: 28.63 KG/M2 | RESPIRATION RATE: 16 BRPM | HEART RATE: 80 BPM | HEIGHT: 67 IN | DIASTOLIC BLOOD PRESSURE: 80 MMHG

## 2020-03-11 DIAGNOSIS — I10 ESSENTIAL HYPERTENSION: ICD-10-CM

## 2020-03-11 DIAGNOSIS — K57.32 DIVERTICULITIS OF COLON: Primary | ICD-10-CM

## 2020-03-11 PROCEDURE — 99214 OFFICE O/P EST MOD 30 MIN: CPT | Performed by: FAMILY MEDICINE

## 2020-03-11 RX ORDER — METOPROLOL SUCCINATE 50 MG/1
50 TABLET, EXTENDED RELEASE ORAL DAILY
Qty: 90 TABLET | Refills: 1 | Status: SHIPPED | OUTPATIENT
Start: 2020-03-11 | End: 2020-09-14

## 2020-03-11 RX ORDER — AMLODIPINE BESYLATE 5 MG/1
5 TABLET ORAL DAILY
Qty: 90 TABLET | Refills: 1 | Status: SHIPPED | OUTPATIENT
Start: 2020-03-11 | End: 2020-09-14

## 2020-03-11 RX ORDER — CIPROFLOXACIN 500 MG/1
500 TABLET, FILM COATED ORAL 2 TIMES DAILY
Qty: 20 TABLET | Refills: 0 | Status: SHIPPED | OUTPATIENT
Start: 2020-03-11 | End: 2020-03-21

## 2020-03-11 RX ORDER — HYDROCHLOROTHIAZIDE 12.5 MG/1
12.5 TABLET ORAL DAILY
Qty: 90 TABLET | Refills: 1 | Status: SHIPPED | OUTPATIENT
Start: 2020-03-11 | End: 2020-09-14

## 2020-03-11 ASSESSMENT — PAIN SCALES - GENERAL: PAINLEVEL: NO PAIN (0)

## 2020-03-11 ASSESSMENT — MIFFLIN-ST. JEOR: SCORE: 1560.88

## 2020-03-11 NOTE — PATIENT INSTRUCTIONS
*    Glad the new combination of blood pressure pills is helping.     *    Will check kidney function today.     *    Try a course of antibiotics for diverticulitis. Cipro.     *    Let us know if the antibiotics help.     *    For billing system, check with our billing office.     *    For blood pressure, come back in 6 month.

## 2020-03-11 NOTE — PROGRESS NOTES
Subjective     Arley Lopez is a 69 year old male who presents to clinic today for the following health issues:    HPI     Diarrhea  Onset: Years, worsening with time. Dx diverticulosis after completing 2012 colonoscopy    Description:   Consistency of stool: loose  Blood in stool: no   Number of loose stools in past 24 hours: 3-5 times per day, he has more BM if he eats more    Progression of Symptoms:  worsening    Accompanying Signs & Symptoms:  Fever: no   Nausea or vomiting; no   Abdominal pain: YES- gas/bloating  Episodes of constipation: no   Weight loss: no     History:   Ill contacts: no   Recent use of antibiotics: no    Recent travels: YES- [place]         Recent medication-new or changes(Rx or OTC): no     Precipitating factors:   None    Alleviating factors:   None    Therapies Tried and outcome:  None      Hypertension Follow-up  Amlodipine 5mg every day, hydrochlorothiazide 25mg every day, metoprolol 50mg every day     Do you check your blood pressure regularly outside of the clinic? Yes     Are you following a low salt diet? No    Are your blood pressures ever more than 140 on the top number (systolic) OR more   than 90 on the bottom number (diastolic), for example 140/90? Yes        Reviewed and updated as needed this visit by Provider         Review of Systems   ROS COMP: CONSTITUTIONAL:NEGATIVE for fever, chills, change in weight  INTEGUMENTARY/SKIN: NEGATIVE for worrisome rashes, moles or lesions  ENT/MOUTH: NEGATIVE for ear, mouth and throat problems  RESP:NEGATIVE for significant cough or SOB  CV: NEGATIVE for chest pain, palpitations or peripheral edema  GI: POSITIVE for gas/bloating and loose stools  MUSCULOSKELETAL: NEGATIVE for significant arthralgias or myalgia  NEURO: NEGATIVE for weakness, dizziness or paresthesias  PSYCHIATRIC: NEGATIVE for changes in mood or affect    This document serves as a record of the services and decisions personally performed and made by Heather Summers MD. It was  "created on his behalf by Curtis Eagle, a trained medical scribe. The creation of this document is based the provider's statements to the medical scribe.  Curtis Eagle 2:52 PM March 11, 2020        Objective    /80   Pulse 80   Temp 98.1  F (36.7  C) (Tympanic)   Resp 16   Ht 1.702 m (5' 7\")   Wt 82.7 kg (182 lb 6 oz)   BMI 28.56 kg/m    Body mass index is 28.56 kg/m .  Physical Exam   GENERAL: alert, pleasant and no distress  HENT: ear canals and TM's normal, nose and mouth without ulcers or lesions  RESP: lungs clear to auscultation - no rales, rhonchi or wheezes  CV: regular rate and rhythm, normal S1 S2  ABDOMEN: soft, there is mild tenderness to palpation in the left lower quadrant without rebound or guarding.  MS: no gross musculoskeletal defects noted, no edema  SKIN: no suspicious lesions or rashes  NEURO: mentation intact and speech normal  PSYCH: mentation appears normal, affect normal/bright    Diagnostic Test Results:    No results found for any visits on 03/11/20.         Assessment & Plan     (K57.32) Diverticulitis of colon  (primary encounter diagnosis)  Comment: Reviewed chart, history of diverticulosis, no prior documentation of diverticulitis.  Will empirically treat with antibiotics and see if there is a clinical response.  Patient does appear stable, no evidence of an acute abdomen.    Plan: ciprofloxacin (CIPRO) 500 MG tablet        Reviewed side effects.  He is to call with any concerns or questions.  Advise and update if symptoms improve or worsen.      (I10) Essential hypertension  Comment: BP within guidelines using 3 drug therapy, ARB, beta blocker and thiazide diuretic.  Plan: Basic metabolic panel, amLODIPine (NORVASC) 5         MG tablet, hydrochlorothiazide (HYDRODIURIL)         12.5 MG tablet, metoprolol succinate ER         (TOPROL-XL) 50 MG 24 hr tablet        Continue medication. Follow up in 6 months.    Patient Instructions   *    Glad the new combination of blood " pressure pills is helping.     *    Will check kidney function today.     *    Try a course of antibiotics for diverticulitis. Cipro.     *    Let us know if the antibiotics help.     *    For billing system, check with our billing office.     *    For blood pressure, come back in 6 month.       Return in about 6 months (around 9/11/2020) for BP Recheck.    The information in this document, created by a scribe for me, accurately reflects the services I personally performed and the decisions made by me. I have reviewed and approved this document for accuracy.     Heather Summers MD  [facility]

## 2020-07-20 DIAGNOSIS — K21.9 GASTROESOPHAGEAL REFLUX DISEASE WITHOUT ESOPHAGITIS: ICD-10-CM

## 2020-09-11 DIAGNOSIS — I10 ESSENTIAL HYPERTENSION: ICD-10-CM

## 2020-09-14 RX ORDER — AMLODIPINE BESYLATE 5 MG/1
TABLET ORAL
Qty: 90 TABLET | Refills: 1 | Status: SHIPPED | OUTPATIENT
Start: 2020-09-14 | End: 2020-11-03

## 2020-09-14 RX ORDER — HYDROCHLOROTHIAZIDE 12.5 MG/1
TABLET ORAL
Qty: 90 TABLET | Refills: 1 | Status: SHIPPED | OUTPATIENT
Start: 2020-09-14 | End: 2020-11-03

## 2020-09-14 RX ORDER — METOPROLOL SUCCINATE 50 MG/1
TABLET, EXTENDED RELEASE ORAL
Qty: 90 TABLET | Refills: 1 | Status: SHIPPED | OUTPATIENT
Start: 2020-09-14 | End: 2020-11-03

## 2020-09-14 NOTE — TELEPHONE ENCOUNTER
Potassium   Date Value Ref Range Status   11/25/2019 3.7 3.4 - 5.3 mmol/L Final     BP Readings from Last 3 Encounters:   03/11/20 132/80   02/05/20 (!) 150/78   01/06/20 (!) 154/86       Prescription approved per [facility] Refill Protocol or patient Primary care provider (PCP) Chart and last OV reviewed   JUDIT Deluca RN/Madhu Langston

## 2020-11-03 ENCOUNTER — OFFICE VISIT (OUTPATIENT)
Dept: FAMILY MEDICINE | Facility: CLINIC | Age: 70
End: 2020-11-03
Payer: COMMERCIAL

## 2020-11-03 ENCOUNTER — TELEPHONE (OUTPATIENT)
Dept: FAMILY MEDICINE | Facility: CLINIC | Age: 70
End: 2020-11-03

## 2020-11-03 VITALS
BODY MASS INDEX: 28.09 KG/M2 | WEIGHT: 179 LBS | HEIGHT: 67 IN | SYSTOLIC BLOOD PRESSURE: 138 MMHG | DIASTOLIC BLOOD PRESSURE: 76 MMHG | TEMPERATURE: 97.9 F | HEART RATE: 75 BPM

## 2020-11-03 DIAGNOSIS — K58.0 IRRITABLE BOWEL SYNDROME WITH DIARRHEA: ICD-10-CM

## 2020-11-03 DIAGNOSIS — I10 ESSENTIAL HYPERTENSION: ICD-10-CM

## 2020-11-03 DIAGNOSIS — Z13.6 CARDIOVASCULAR SCREENING; LDL GOAL LESS THAN 160: ICD-10-CM

## 2020-11-03 DIAGNOSIS — Z00.00 ENCOUNTER FOR MEDICARE ANNUAL WELLNESS EXAM: Primary | ICD-10-CM

## 2020-11-03 DIAGNOSIS — K21.9 GASTROESOPHAGEAL REFLUX DISEASE WITHOUT ESOPHAGITIS: ICD-10-CM

## 2020-11-03 LAB
ALBUMIN SERPL-MCNC: 4 G/DL (ref 3.4–5)
ALP SERPL-CCNC: 100 U/L (ref 40–150)
ALT SERPL W P-5'-P-CCNC: 25 U/L (ref 0–70)
ANION GAP SERPL CALCULATED.3IONS-SCNC: 5 MMOL/L (ref 3–14)
AST SERPL W P-5'-P-CCNC: 16 U/L (ref 0–45)
BILIRUB SERPL-MCNC: 0.5 MG/DL (ref 0.2–1.3)
BUN SERPL-MCNC: 12 MG/DL (ref 7–30)
CALCIUM SERPL-MCNC: 8.9 MG/DL (ref 8.5–10.1)
CHLORIDE SERPL-SCNC: 107 MMOL/L (ref 94–109)
CHOLEST SERPL-MCNC: 199 MG/DL
CO2 SERPL-SCNC: 29 MMOL/L (ref 20–32)
CREAT SERPL-MCNC: 0.92 MG/DL (ref 0.66–1.25)
GFR SERPL CREATININE-BSD FRML MDRD: 85 ML/MIN/{1.73_M2}
GLUCOSE SERPL-MCNC: 91 MG/DL (ref 70–99)
HDLC SERPL-MCNC: 73 MG/DL
LDLC SERPL CALC-MCNC: 118 MG/DL
NONHDLC SERPL-MCNC: 126 MG/DL
POTASSIUM SERPL-SCNC: 4.2 MMOL/L (ref 3.4–5.3)
PROT SERPL-MCNC: 7.8 G/DL (ref 6.8–8.8)
SODIUM SERPL-SCNC: 141 MMOL/L (ref 133–144)
TRIGL SERPL-MCNC: 41 MG/DL

## 2020-11-03 PROCEDURE — G0438 PPPS, INITIAL VISIT: HCPCS | Performed by: FAMILY MEDICINE

## 2020-11-03 PROCEDURE — 90662 IIV NO PRSV INCREASED AG IM: CPT | Performed by: FAMILY MEDICINE

## 2020-11-03 PROCEDURE — 99213 OFFICE O/P EST LOW 20 MIN: CPT | Mod: 25 | Performed by: FAMILY MEDICINE

## 2020-11-03 PROCEDURE — 80061 LIPID PANEL: CPT | Performed by: FAMILY MEDICINE

## 2020-11-03 PROCEDURE — G0008 ADMIN INFLUENZA VIRUS VAC: HCPCS | Performed by: FAMILY MEDICINE

## 2020-11-03 PROCEDURE — 80053 COMPREHEN METABOLIC PANEL: CPT | Performed by: FAMILY MEDICINE

## 2020-11-03 PROCEDURE — 36415 COLL VENOUS BLD VENIPUNCTURE: CPT | Performed by: FAMILY MEDICINE

## 2020-11-03 RX ORDER — HYDROCHLOROTHIAZIDE 12.5 MG/1
TABLET ORAL
Qty: 90 TABLET | Refills: 3 | Status: SHIPPED | OUTPATIENT
Start: 2020-11-03

## 2020-11-03 RX ORDER — AMLODIPINE BESYLATE 5 MG/1
TABLET ORAL
Qty: 90 TABLET | Refills: 3 | Status: SHIPPED | OUTPATIENT
Start: 2020-11-03

## 2020-11-03 RX ORDER — NORTRIPTYLINE HCL 10 MG
10 CAPSULE ORAL AT BEDTIME
Qty: 90 CAPSULE | Refills: 0 | Status: SHIPPED | OUTPATIENT
Start: 2020-11-03 | End: 2020-12-17

## 2020-11-03 RX ORDER — METOPROLOL SUCCINATE 50 MG/1
TABLET, EXTENDED RELEASE ORAL
Qty: 90 TABLET | Refills: 3 | Status: SHIPPED | OUTPATIENT
Start: 2020-11-03

## 2020-11-03 RX ORDER — METOPROLOL SUCCINATE 50 MG/1
TABLET, EXTENDED RELEASE ORAL
Qty: 90 TABLET | Refills: 1 | Status: CANCELLED | OUTPATIENT
Start: 2020-11-03

## 2020-11-03 RX ORDER — AMLODIPINE BESYLATE 5 MG/1
TABLET ORAL
Qty: 90 TABLET | Refills: 1 | Status: CANCELLED | OUTPATIENT
Start: 2020-11-03

## 2020-11-03 RX ORDER — HYDROCHLOROTHIAZIDE 12.5 MG/1
TABLET ORAL
Qty: 90 TABLET | Refills: 1 | Status: CANCELLED | OUTPATIENT
Start: 2020-11-03

## 2020-11-03 ASSESSMENT — PAIN SCALES - GENERAL: PAINLEVEL: NO PAIN (0)

## 2020-11-03 ASSESSMENT — MIFFLIN-ST. JEOR: SCORE: 1540.57

## 2020-11-03 NOTE — PATIENT INSTRUCTIONS
Patient Education   Personalized Prevention Plan  You are due for the preventive services outlined below.  Your care team is available to assist you in scheduling these services.  If you have already completed any of these items, please share that information with your care team to update in your medical record.  Health Maintenance Due   Topic Date Due     Discuss Advance Care Planning  1950     Hepatitis C Screening  06/30/1970     Zoster (Shingles) Vaccine (2 of 3) 12/12/2014     Flu Vaccine (1) 09/01/2020      *   For the bowels, try a medication called nortriptyline. Give this one month. Keep me updated. We can increase the dose if needed.     *    Refills sent for your other medications.     *    your blood pressure is okay.     *   Colonoscopy in 2022.

## 2020-11-03 NOTE — LETTER
December 16, 2020      Arley HURST Jessica  [address] [facility] [address]        Dear Arley,    Cam you will find a copy of your recent test results.  They show that you have normal kidney and liver function.  Also there is no signs of diabetes.  Your cholesterol is acceptable.  At this time, there is no need for cholesterol-lowering medication.  Please call with any questions or concerns.     Resulted Orders   Comprehensive metabolic panel (BMP + Alb, Alk Phos, ALT, AST, Total. Bili, TP)   Result Value Ref Range    Sodium 141 133 - 144 mmol/L    Potassium 4.2 3.4 - 5.3 mmol/L    Chloride 107 94 - 109 mmol/L    Carbon Dioxide 29 20 - 32 mmol/L    Anion Gap 5 3 - 14 mmol/L    Glucose 91 70 - 99 mg/dL      Comment:      Fasting specimen    Urea Nitrogen 12 7 - 30 mg/dL    Creatinine 0.92 0.66 - 1.25 mg/dL    GFR Estimate 85 >60 mL/min/[1.73_m2]      Comment:      Non  GFR Calc  Starting 12/18/2018, serum creatinine based estimated GFR (eGFR) will be   calculated using the Chronic Kidney Disease Epidemiology Collaboration   (CKD-EPI) equation.      GFR Estimate If Black >90 >60 mL/min/[1.73_m2]      Comment:       GFR Calc  Starting 12/18/2018, serum creatinine based estimated GFR (eGFR) will be   calculated using the Chronic Kidney Disease Epidemiology Collaboration   (CKD-EPI) equation.      Calcium 8.9 8.5 - 10.1 mg/dL    Bilirubin Total 0.5 0.2 - 1.3 mg/dL    Albumin 4.0 3.4 - 5.0 g/dL    Protein Total 7.8 6.8 - 8.8 g/dL    Alkaline Phosphatase 100 40 - 150 U/L    ALT 25 0 - 70 U/L    AST 16 0 - 45 U/L   Lipid panel reflex to direct LDL Fasting   Result Value Ref Range    Cholesterol 199 <200 mg/dL    Triglycerides 41 <150 mg/dL      Comment:      Fasting specimen    HDL Cholesterol 73 >39 mg/dL    LDL Cholesterol Calculated 118 (H) <100 mg/dL      Comment:      Above desirable:  100-129 mg/dl  Borderline High:  130-159 mg/dL  High:             160-189 mg/dL  Very high:        >189 mg/dl      Non HDL Cholesterol 126 <130 mg/dL       If you have any questions or concerns, please call the clinic at the number listed above.       Sincerely,      Heather Summers MD

## 2020-11-03 NOTE — PROGRESS NOTES
"  SUBJECTIVE:   Arley Lopez is a 70 year old male who presents for Preventive Visit.      Patient has been advised of split billing requirements and indicates understanding: Yes  Are you in the first 12 months of your Medicare Part B coverage?  No    Physical Health:    In general, how would you rate your overall physical health? excellent    Outside of work, how many days during the week do you exercise? 6-7 days/week    Outside of work, approximately how many minutes a day do you exercise?45-60 minutes    If you drink alcohol do you typically have >3 drinks per day or >7 drinks per week? No    Do you usually eat at least 4 servings of fruit and vegetables a day, include whole grains & fiber and avoid regularly eating high fat or \"junk\" foods? NO    Do you have any problems taking medications regularly?  No    Do you have any side effects from medications? none    Needs assistance for the following daily activities: no assistance needed    Which of the following safety concerns are present in your home?  none identified     Hearing impairment: No    In the past 6 months, have you been bothered by leaking of urine? Yes, a little bit     Mental Health:    In general, how would you rate your overall mental or emotional health? good  PHQ-2 Score:      Do you feel safe in your environment? Yes    Have you ever done Advance Care Planning? (For example, a Health Directive, POLST, or a discussion with a medical provider or your loved ones about your wishes): No, advance care planning information given to patient to review.  Patient declined advance care planning discussion at this time.    Additional concerns to address?  YES:   -He is having some GI issues still. Has seen [facility] in the past.     Fall risk:  Fallen 2 or more times in the past year?: No  Any fall with injury in the past year?: No    Cognitive Screenin) Repeat 3 items (Leader, Season, Table)    2) Clock draw: NORMAL  3) 3 item recall: Recalls 2 objects "   Results: NORMAL clock, 1-2 items recalled: COGNITIVE IMPAIRMENT LESS LIKELY    Mini-CogTM Copyright [employer]. Licensed by the author for use in [facility]; reprinted with permission (missy@[facility]). All rights reserved.      Do you have sleep apnea, excessive snoring or daytime drowsiness?: no            Reviewed and updated as needed this visit by clinical staff  Tobacco  Allergies  Meds   Med Hx  Surg Hx  Fam Hx  Soc Hx        Reviewed and updated as needed this visit by Provider                Social History     Tobacco Use     Smoking status: Former Smoker     Packs/day: 2.00     Years: 2.00     Pack years: 4.00     Quit date: 1992     Years since quittin.8     Smokeless tobacco: Never Used   Substance Use Topics     Alcohol use: Yes     Comment: beer per monthly                           Current providers sharing in care for this patient include:   Patient Care Team:  Daniel Rice DO as PCP - General (Family Practice)  Daniel Rice DO as Assigned PCP  Yehuda Puga MD as Assigned Surgical Provider    The following health maintenance items are reviewed in Epic and correct as of today:  Health Maintenance   Topic Date Due     ADVANCE CARE PLANNING  1950     HEPATITIS C SCREENING  1970     ZOSTER IMMUNIZATION (2 of 3) 2014     INFLUENZA VACCINE (1) 2020     FALL RISK ASSESSMENT  2020     MEDICARE ANNUAL WELLNESS VISIT  2021     COLORECTAL CANCER SCREENING  2022     LIPID  2024     DTAP/TDAP/TD IMMUNIZATION (4 - Td) 2029     PHQ-2  Completed     Pneumococcal Vaccine: 65+ Years  Completed     AORTIC ANEURYSM SCREENING (SYSTEM ASSIGNED)  Completed     Pneumococcal Vaccine: Pediatrics (0 to 5 Years) and At-Risk Patients (6 to 64 Years)  Aged Out     IPV IMMUNIZATION  Aged Out     MENINGITIS IMMUNIZATION  Aged Out     HEPATITIS B IMMUNIZATION  Aged Out       ROS:  CONSTITUTIONAL: NEGATIVE for fever, chills, change in  "weight  ENT/MOUTH: NEGATIVE for ear, mouth and throat problems  RESP: NEGATIVE for significant cough or SOB  CV: NEGATIVE for chest pain, palpitations or peripheral edema  GI: Intermittent loose stools.  Nonbloody.  : Notes some urinary frequency.  PSYCHIATRIC: NEGATIVE for changes in mood or affect    OBJECTIVE:   BP (!) 171/88   Pulse 75   Temp 97.9  F (36.6  C) (Tympanic)   Ht 1.702 m (5' 7\")   Wt 81.2 kg (179 lb)   BMI 28.04 kg/m   Estimated body mass index is 28.04 kg/m  as calculated from the following:    Height as of this encounter: 1.702 m (5' 7\").    Weight as of this encounter: 81.2 kg (179 lb).  EXAM:   GENERAL: Healthy, alert and no distress  EYES: Eyes grossly normal to inspection, conjunctivae and sclerae normal  RESP: Lungs clear to auscultation - no rales, rhonchi or wheezes  CV: Regular rate and rhythm, normal S1 S2, no murmur  MS: No gross musculoskeletal defects noted, no edema  NEURO: Normal strength and tone, mentation intact and speech normal  PSYCH: Mentation appears normal, affect normal/bright     Diagnostic Test Results:  Labs reviewed in Epic  Results for orders placed or performed in visit on 11/03/20   Comprehensive metabolic panel (BMP + Alb, Alk Phos, ALT, AST, Total. Bili, TP)     Status: None   Result Value Ref Range    Sodium 141 133 - 144 mmol/L    Potassium 4.2 3.4 - 5.3 mmol/L    Chloride 107 94 - 109 mmol/L    Carbon Dioxide 29 20 - 32 mmol/L    Anion Gap 5 3 - 14 mmol/L    Glucose 91 70 - 99 mg/dL    Urea Nitrogen 12 7 - 30 mg/dL    Creatinine 0.92 0.66 - 1.25 mg/dL    GFR Estimate 85 >60 mL/min/[1.73_m2]    GFR Estimate If Black >90 >60 mL/min/[1.73_m2]    Calcium 8.9 8.5 - 10.1 mg/dL    Bilirubin Total 0.5 0.2 - 1.3 mg/dL    Albumin 4.0 3.4 - 5.0 g/dL    Protein Total 7.8 6.8 - 8.8 g/dL    Alkaline Phosphatase 100 40 - 150 U/L    ALT 25 0 - 70 U/L    AST 16 0 - 45 U/L   Lipid panel reflex to direct LDL Fasting     Status: Abnormal   Result Value Ref Range    " Cholesterol 199 <200 mg/dL    Triglycerides 41 <150 mg/dL    HDL Cholesterol 73 >39 mg/dL    LDL Cholesterol Calculated 118 (H) <100 mg/dL    Non HDL Cholesterol 126 <130 mg/dL       ASSESSMENT / PLAN:     (Z00.00) Encounter for Medicare annual wellness exam  (primary encounter diagnosis)      (I10) Essential hypertension  Comment: Within guidelines using 3 drug therapy.  Very good renal function, normal potassium.  Plan: amLODIPine (NORVASC) 5 MG tablet,         hydrochlorothiazide (HYDRODIURIL) 12.5 MG         tablet, metoprolol succinate ER (TOPROL-XL) 50         MG 24 hr tablet, Comprehensive metabolic panel         (BMP + Alb, Alk Phos, ALT, AST, Total. Bili,         TP)        Continue.  1 year refill.    (K21.9) Gastroesophageal reflux disease without esophagitis  Comment: Appears to need daily PPI therapy.  Plan: omeprazole (PRILOSEC) 20 MG DR capsule        For now, continue.    (K58.0) Irritable bowel syndrome with diarrhea  Comment: At times disrupted the daily activity.  Normal colonoscopy 2012.  Chronic, nonbloody, doubt colitis.  Most probable diagnosis of IBS.  Plan: nortriptyline (PAMELOR) 10 MG capsule        Reviewed options, will try low-dose tricyclic therapy.  Reviewed side effects including sedation, antiforaminal side effects, dry mouth.  Advised the patient and we can increase the dose if needed.  He is to update me in 1 to 2 months regarding effectiveness.    (Z13.6) CARDIOVASCULAR SCREENING; LDL GOAL LESS THAN 160  Comment: Acceptable profile, not on statin therapy.  Excellent HDL.  Plan: Lipid panel reflex to direct LDL Fasting        Monitor.  No strong indication for statin therapy at this time.    Patient Instructions        *   For the bowels, try a medication called nortriptyline. Give this one month. Keep me updated. We can increase the dose if needed.     *    Refills sent for your other medications.     *    your blood pressure is okay.     *   Colonoscopy in 2022.     Patient has  "been advised of split billing requirements and indicates understanding: Yes    COUNSELING:  Reviewed preventive health counseling, as reflected in patient instructions       Regular exercise       Healthy diet/nutrition       Vision screening       Hearing screening       Dental care       Immunizations    Vaccinated for: Influenza             Colon cancer screening       Prostate cancer screening    Estimated body mass index is 28.04 kg/m  as calculated from the following:    Height as of this encounter: 1.702 m (5' 7\").    Weight as of this encounter: 81.2 kg (179 lb).        He reports that he quit smoking about 28 years ago. He has a 4.00 pack-year smoking history. He has never used smokeless tobacco.    Appropriate preventive services were discussed with this patient, including applicable screening as appropriate for cardiovascular disease, diabetes, osteopenia/osteoporosis, and glaucoma.  As appropriate for age/gender, discussed screening for colorectal cancer, prostate cancer, breast cancer, and cervical cancer. Checklist reviewing preventive services available has been given to the patient.    Reviewed patients plan of care and provided an AVS. The Intermediate Care Plan ( asthma action plan, low back pain action plan, and migraine action plan) for Arley meets the Care Plan requirement. This Care Plan has been established and reviewed with the Patient.    Counseling Resources:  ATP IV Guidelines  Pooled Cohorts Equation Calculator  Breast Cancer Risk Calculator  BRCA-Related Cancer Risk Assessment: FHS-7 Tool  FRAX Risk Assessment  ICSI Preventive Guidelines  Dietary Guidelines for Americans, 2010  USDA's MyPlate  ASA Prophylaxis  Lung CA Screening    Heather Summers MD  [facility]  "

## 2020-11-04 NOTE — TELEPHONE ENCOUNTER
Central Prior Authorization Team   Phone: [phone]    PA Initiation    Medication: nortriptyline (PAMELOR) 10 MG capsule  Insurance Company: [employer] - Phone [phone] Fax [phone]  Pharmacy Filling the Rx: [facility] [address] [place] [address]  Filling Pharmacy Phone: [phone]  Filling Pharmacy Fax: [phone]  Start Date: 11/4/2020

## 2020-11-04 NOTE — TELEPHONE ENCOUNTER
Prior Authorization Approval    Authorization Effective Date: 11/4/2020  Authorization Expiration Date: 12/31/2021  Medication: nortriptyline (PAMELOR) 10 MG-APPROVED  Approved Dose/Quantity:    Reference #:     Insurance Company: [employer] [phone] Fax [phone]  Expected CoPay:       CoPay Card Available:      Foundation Assistance Needed:    Which Pharmacy is filling the prescription (Not needed for infusion/clinic administered): [facility] [address] [place] [address]  Pharmacy Notified: Yes  Patient Notified: Yes  **Instructed pharmacy to notify patient when script is ready to /ship.**

## 2020-12-18 DIAGNOSIS — K58.0 IRRITABLE BOWEL SYNDROME WITH DIARRHEA: ICD-10-CM

## 2020-12-21 RX ORDER — NORTRIPTYLINE HCL 10 MG
10 CAPSULE ORAL AT BEDTIME
Qty: 90 CAPSULE | Refills: 0 | OUTPATIENT
Start: 2020-12-21

## 2021-06-23 DIAGNOSIS — K58.0 IRRITABLE BOWEL SYNDROME WITH DIARRHEA: ICD-10-CM

## 2021-06-24 RX ORDER — NORTRIPTYLINE HCL 10 MG
CAPSULE ORAL
Qty: 90 CAPSULE | Refills: 0 | Status: SHIPPED | OUTPATIENT
Start: 2021-06-24

## 2021-06-24 NOTE — TELEPHONE ENCOUNTER
"Prescription approved per [facility] Refill Protocol.  Requested Prescriptions   Pending Prescriptions Disp Refills     nortriptyline (PAMELOR) 10 MG capsule [Pharmacy Med Name: Nortriptyline HCl 10 MG Oral Capsule] 90 capsule 0     Sig: Take 1 capsule by mouth at bedtime       Tricyclic Agents ( Annual appt and no PHQ9) Passed - 6/23/2021  2:09 PM        Passed - Blood Pressure under 140/90 in past 12 mos     BP Readings from Last 3 Encounters:   11/03/20 138/76   03/11/20 132/80   02/05/20 (!) 150/78                 Passed - Recent (12 mo) or future (30 days) visit within authorizing provider's specialty     Patient has had an office visit with the authorizing provider or a provider within the authorizing providers department within the previous 12 mos or has a future within next 30 days. See \"Patient Info\" tab in inbasket, or \"Choose Columns\" in Meds & Orders section of the refill encounter.              Passed - Medication is active on med list        Passed - Patient is age 18 or older             "

## 2021-12-18 DIAGNOSIS — K21.9 GASTROESOPHAGEAL REFLUX DISEASE WITHOUT ESOPHAGITIS: ICD-10-CM

## 2022-09-27 ENCOUNTER — PATIENT OUTREACH (OUTPATIENT)
Dept: FAMILY MEDICINE | Facility: CLINIC | Age: 72
End: 2022-09-27

## 2022-09-27 NOTE — TELEPHONE ENCOUNTER
Patient Quality Outreach    Patient is due for the following:   Physical    Colonoscopy      Topic Date Due     Zoster (Shingles) Vaccine (2 of 3) 12/12/2014     COVID-19 Vaccine (2 - Moderna series) 04/13/2021     Flu Vaccine (1) 09/01/2022       Next Steps:   Schedule a Annual Wellness Visit    Type of outreach:    Sent letter.      Questions for provider review:    None     Penny Salas

## 2022-09-27 NOTE — LETTER
September 27, 2022      Arley HURST Jessica  [address]  [facility] [address]      Your healthcare team cares about your health. To provide you with the best care,   we have reviewed your chart and based on our findings, we see that you are due to:     - COLON CANCER SCREENING:  Call or mychart the clinic to schedule your colonoscopy or schedule/ your FIT Test, or Cologuard test  - ANNUAL WELLNESS FOLLOW UP:   Schedule an Annual Medicare Wellness Exam. This can be done by in person visit or virtual video visit.       Topic Date Due     Zoster (Shingles) Vaccine (2 of 3) 12/12/2014     COVID-19 Vaccine (2 - Moderna series) 04/13/2021     Flu Vaccine (1) 09/01/2022     If you have already completed these items, please contact the clinic via phone or   [employer] so your care team can review and update your records. Thank you for   choosing [facility] for your healthcare needs. For any questions,   concerns, or to schedule an appointment please contact the clinic.       Healthy Regards,      Your [facility] Care Team

## 2025-03-24 ENCOUNTER — APPOINTMENT (OUTPATIENT)
Dept: CT IMAGING | Facility: CLINIC | Age: 75
End: 2025-03-24
Attending: EMERGENCY MEDICINE
Payer: COMMERCIAL

## 2025-03-24 ENCOUNTER — HOSPITAL ENCOUNTER (INPATIENT)
Facility: CLINIC | Age: 75
LOS: 3 days | Discharge: HOME | End: 2025-03-27
Attending: EMERGENCY MEDICINE | Admitting: STUDENT IN AN ORGANIZED HEALTH CARE EDUCATION/TRAINING PROGRAM
Payer: COMMERCIAL

## 2025-03-24 DIAGNOSIS — N41.0 ACUTE PROSTATITIS: Primary | ICD-10-CM

## 2025-03-24 DIAGNOSIS — E83.42 HYPOMAGNESEMIA: ICD-10-CM

## 2025-03-24 DIAGNOSIS — N39.0 URINARY TRACT INFECTION WITHOUT HEMATURIA, SITE UNSPECIFIED: ICD-10-CM

## 2025-03-24 DIAGNOSIS — A41.9 SEPSIS WITHOUT ACUTE ORGAN DYSFUNCTION, DUE TO UNSPECIFIED ORGANISM (H): ICD-10-CM

## 2025-03-24 DIAGNOSIS — B37.0 THRUSH: ICD-10-CM

## 2025-03-24 LAB
ALBUMIN SERPL BCG-MCNC: 4.4 G/DL (ref 3.5–5.2)
ALBUMIN UR-MCNC: 20 MG/DL
ALP SERPL-CCNC: 141 U/L (ref 40–150)
ALT SERPL W P-5'-P-CCNC: 23 U/L (ref 0–70)
AMPHETAMINES UR QL SCN: NORMAL
ANION GAP SERPL CALCULATED.3IONS-SCNC: 15 MMOL/L (ref 7–15)
APPEARANCE UR: ABNORMAL
AST SERPL W P-5'-P-CCNC: 22 U/L (ref 0–45)
ATRIAL RATE - MUSE: 105 BPM
BARBITURATES UR QL SCN: NORMAL
BASOPHILS # BLD MANUAL: 0 10E3/UL (ref 0–0.2)
BASOPHILS NFR BLD MANUAL: 0 %
BENZODIAZ UR QL SCN: NORMAL
BILIRUB SERPL-MCNC: 0.9 MG/DL
BILIRUB UR QL STRIP: NEGATIVE
BUN SERPL-MCNC: 20.9 MG/DL (ref 8–23)
BZE UR QL SCN: NORMAL
CALCIUM SERPL-MCNC: 9.4 MG/DL (ref 8.8–10.4)
CANNABINOIDS UR QL SCN: NORMAL
CHLORIDE SERPL-SCNC: 101 MMOL/L (ref 98–107)
COLOR UR AUTO: YELLOW
CREAT SERPL-MCNC: 1.45 MG/DL (ref 0.67–1.17)
DIASTOLIC BLOOD PRESSURE - MUSE: NORMAL MMHG
EGFRCR SERPLBLD CKD-EPI 2021: 51 ML/MIN/1.73M2
EOSINOPHIL # BLD MANUAL: 0 10E3/UL (ref 0–0.7)
EOSINOPHIL NFR BLD MANUAL: 0 %
ERYTHROCYTE [DISTWIDTH] IN BLOOD BY AUTOMATED COUNT: 13 % (ref 10–15)
ETHANOL SERPL-MCNC: <0.01 G/DL
FENTANYL UR QL: NORMAL
GLUCOSE SERPL-MCNC: 117 MG/DL (ref 70–99)
GLUCOSE UR STRIP-MCNC: 50 MG/DL
HCO3 SERPL-SCNC: 24 MMOL/L (ref 22–29)
HCT VFR BLD AUTO: 38.4 % (ref 40–53)
HGB BLD-MCNC: 13 G/DL (ref 13.3–17.7)
HGB UR QL STRIP: ABNORMAL
HOLD SPECIMEN: NORMAL
HOLD SPECIMEN: NORMAL
HYALINE CASTS: 1 /LPF
INTERPRETATION ECG - MUSE: NORMAL
KETONES UR STRIP-MCNC: NEGATIVE MG/DL
LACTATE SERPL-SCNC: 1.2 MMOL/L (ref 0.7–2)
LEUKOCYTE ESTERASE UR QL STRIP: ABNORMAL
LYMPHOCYTES # BLD MANUAL: 1.8 10E3/UL (ref 0.8–5.3)
LYMPHOCYTES NFR BLD MANUAL: 7 %
MAGNESIUM SERPL-MCNC: 1.4 MG/DL (ref 1.7–2.3)
MCH RBC QN AUTO: 29.7 PG (ref 26.5–33)
MCHC RBC AUTO-ENTMCNC: 33.9 G/DL (ref 31.5–36.5)
MCV RBC AUTO: 88 FL (ref 78–100)
MONOCYTES # BLD MANUAL: 3.1 10E3/UL (ref 0–1.3)
MONOCYTES NFR BLD MANUAL: 12 %
NEUTROPHILS # BLD MANUAL: 21.2 10E3/UL (ref 1.6–8.3)
NEUTROPHILS NFR BLD MANUAL: 81 %
NITRATE UR QL: NEGATIVE
OPIATES UR QL SCN: NORMAL
P AXIS - MUSE: 54 DEGREES
PCP QUAL URINE (ROCHE): NORMAL
PH UR STRIP: 5.5 [PH] (ref 5–7)
PLAT MORPH BLD: NORMAL
PLATELET # BLD AUTO: 208 10E3/UL (ref 150–450)
POTASSIUM SERPL-SCNC: 3.9 MMOL/L (ref 3.4–5.3)
PR INTERVAL - MUSE: 184 MS
PROT SERPL-MCNC: 7.7 G/DL (ref 6.4–8.3)
QRS DURATION - MUSE: 92 MS
QT - MUSE: 326 MS
QTC - MUSE: 430 MS
R AXIS - MUSE: -13 DEGREES
RBC # BLD AUTO: 4.38 10E6/UL (ref 4.4–5.9)
RBC MORPH BLD: NORMAL
RBC URINE: 6 /HPF
SODIUM SERPL-SCNC: 140 MMOL/L (ref 135–145)
SP GR UR STRIP: 1.02 (ref 1–1.03)
SYSTOLIC BLOOD PRESSURE - MUSE: NORMAL MMHG
T AXIS - MUSE: 49 DEGREES
TROPONIN T SERPL HS-MCNC: 14 NG/L
TROPONIN T SERPL HS-MCNC: 14 NG/L
TSH SERPL DL<=0.005 MIU/L-ACNC: 1.58 UIU/ML (ref 0.3–4.2)
UROBILINOGEN UR STRIP-MCNC: NORMAL MG/DL
VENTRICULAR RATE- MUSE: 105 BPM
WBC # BLD AUTO: 26.2 10E3/UL (ref 4–11)
WBC URINE: 179 /HPF

## 2025-03-24 PROCEDURE — 96365 THER/PROPH/DIAG IV INF INIT: CPT | Mod: 59

## 2025-03-24 PROCEDURE — 93010 ELECTROCARDIOGRAM REPORT: CPT | Performed by: EMERGENCY MEDICINE

## 2025-03-24 PROCEDURE — 36415 COLL VENOUS BLD VENIPUNCTURE: CPT | Performed by: EMERGENCY MEDICINE

## 2025-03-24 PROCEDURE — 82607 VITAMIN B-12: CPT

## 2025-03-24 PROCEDURE — 82077 ASSAY SPEC XCP UR&BREATH IA: CPT

## 2025-03-24 PROCEDURE — 99291 CRITICAL CARE FIRST HOUR: CPT | Mod: 25

## 2025-03-24 PROCEDURE — 99223 1ST HOSP IP/OBS HIGH 75: CPT | Mod: AI

## 2025-03-24 PROCEDURE — 83605 ASSAY OF LACTIC ACID: CPT | Performed by: EMERGENCY MEDICINE

## 2025-03-24 PROCEDURE — 70450 CT HEAD/BRAIN W/O DYE: CPT

## 2025-03-24 PROCEDURE — 80053 COMPREHEN METABOLIC PANEL: CPT | Performed by: EMERGENCY MEDICINE

## 2025-03-24 PROCEDURE — 96361 HYDRATE IV INFUSION ADD-ON: CPT

## 2025-03-24 PROCEDURE — 85027 COMPLETE CBC AUTOMATED: CPT | Performed by: EMERGENCY MEDICINE

## 2025-03-24 PROCEDURE — 85007 BL SMEAR W/DIFF WBC COUNT: CPT | Performed by: EMERGENCY MEDICINE

## 2025-03-24 PROCEDURE — 84484 ASSAY OF TROPONIN QUANT: CPT | Performed by: EMERGENCY MEDICINE

## 2025-03-24 PROCEDURE — 93005 ELECTROCARDIOGRAM TRACING: CPT

## 2025-03-24 PROCEDURE — 250N000011 HC RX IP 250 OP 636: Performed by: EMERGENCY MEDICINE

## 2025-03-24 PROCEDURE — 80307 DRUG TEST PRSMV CHEM ANLYZR: CPT

## 2025-03-24 PROCEDURE — 250N000009 HC RX 250: Performed by: EMERGENCY MEDICINE

## 2025-03-24 PROCEDURE — 72125 CT NECK SPINE W/O DYE: CPT

## 2025-03-24 PROCEDURE — 99291 CRITICAL CARE FIRST HOUR: CPT | Mod: 25 | Performed by: EMERGENCY MEDICINE

## 2025-03-24 PROCEDURE — 81001 URINALYSIS AUTO W/SCOPE: CPT | Performed by: EMERGENCY MEDICINE

## 2025-03-24 PROCEDURE — 250N000013 HC RX MED GY IP 250 OP 250 PS 637

## 2025-03-24 PROCEDURE — 87086 URINE CULTURE/COLONY COUNT: CPT | Performed by: EMERGENCY MEDICINE

## 2025-03-24 PROCEDURE — 83735 ASSAY OF MAGNESIUM: CPT | Performed by: EMERGENCY MEDICINE

## 2025-03-24 PROCEDURE — 87040 BLOOD CULTURE FOR BACTERIA: CPT | Performed by: EMERGENCY MEDICINE

## 2025-03-24 PROCEDURE — 96366 THER/PROPH/DIAG IV INF ADDON: CPT

## 2025-03-24 PROCEDURE — 258N000003 HC RX IP 258 OP 636: Performed by: EMERGENCY MEDICINE

## 2025-03-24 PROCEDURE — 84443 ASSAY THYROID STIM HORMONE: CPT | Performed by: EMERGENCY MEDICINE

## 2025-03-24 PROCEDURE — 120N000001 HC R&B MED SURG/OB

## 2025-03-24 PROCEDURE — 70496 CT ANGIOGRAPHY HEAD: CPT

## 2025-03-24 RX ORDER — ONDANSETRON 4 MG/1
4 TABLET, ORALLY DISINTEGRATING ORAL EVERY 6 HOURS PRN
Status: DISCONTINUED | OUTPATIENT
Start: 2025-03-24 | End: 2025-03-27 | Stop reason: HOSPADM

## 2025-03-24 RX ORDER — CEFTRIAXONE 2 G/1
2 INJECTION, POWDER, FOR SOLUTION INTRAMUSCULAR; INTRAVENOUS ONCE
Status: COMPLETED | OUTPATIENT
Start: 2025-03-24 | End: 2025-03-24

## 2025-03-24 RX ORDER — PROCHLORPERAZINE MALEATE 5 MG/1
5 TABLET ORAL EVERY 6 HOURS PRN
Status: DISCONTINUED | OUTPATIENT
Start: 2025-03-24 | End: 2025-03-27 | Stop reason: HOSPADM

## 2025-03-24 RX ORDER — CEFTRIAXONE 2 G/1
2 INJECTION, POWDER, FOR SOLUTION INTRAMUSCULAR; INTRAVENOUS EVERY 24 HOURS
Status: DISCONTINUED | OUTPATIENT
Start: 2025-03-25 | End: 2025-03-27

## 2025-03-24 RX ORDER — METOPROLOL SUCCINATE 50 MG/1
50 TABLET, EXTENDED RELEASE ORAL DAILY
Status: DISCONTINUED | OUTPATIENT
Start: 2025-03-25 | End: 2025-03-27 | Stop reason: HOSPADM

## 2025-03-24 RX ORDER — ASPIRIN 81 MG/1
81 TABLET ORAL AT BEDTIME
Status: DISCONTINUED | OUTPATIENT
Start: 2025-03-24 | End: 2025-03-27 | Stop reason: HOSPADM

## 2025-03-24 RX ORDER — ONDANSETRON 4 MG/1
4 TABLET, ORALLY DISINTEGRATING ORAL EVERY 6 HOURS PRN
Status: CANCELLED | OUTPATIENT
Start: 2025-03-24

## 2025-03-24 RX ORDER — CALCIUM CARBONATE 500 MG/1
1000 TABLET, CHEWABLE ORAL 4 TIMES DAILY PRN
Status: DISCONTINUED | OUTPATIENT
Start: 2025-03-24 | End: 2025-03-24

## 2025-03-24 RX ORDER — ATORVASTATIN CALCIUM 20 MG/1
1 TABLET, FILM COATED ORAL DAILY
COMMUNITY
Start: 2024-06-24

## 2025-03-24 RX ORDER — AMOXICILLIN 250 MG
1 CAPSULE ORAL 2 TIMES DAILY PRN
Status: DISCONTINUED | OUTPATIENT
Start: 2025-03-24 | End: 2025-03-27 | Stop reason: HOSPADM

## 2025-03-24 RX ORDER — PANTOPRAZOLE SODIUM 20 MG/1
20 TABLET, DELAYED RELEASE ORAL 2 TIMES DAILY
Status: DISCONTINUED | OUTPATIENT
Start: 2025-03-24 | End: 2025-03-27 | Stop reason: HOSPADM

## 2025-03-24 RX ORDER — LIDOCAINE 40 MG/G
CREAM TOPICAL
Status: DISCONTINUED | OUTPATIENT
Start: 2025-03-24 | End: 2025-03-27 | Stop reason: HOSPADM

## 2025-03-24 RX ORDER — AMLODIPINE BESYLATE 5 MG/1
5 TABLET ORAL DAILY
Status: DISCONTINUED | OUTPATIENT
Start: 2025-03-25 | End: 2025-03-27 | Stop reason: HOSPADM

## 2025-03-24 RX ORDER — ACETAMINOPHEN 325 MG/1
650 TABLET ORAL EVERY 4 HOURS PRN
Status: DISCONTINUED | OUTPATIENT
Start: 2025-03-24 | End: 2025-03-27 | Stop reason: HOSPADM

## 2025-03-24 RX ORDER — NORTRIPTYLINE HYDROCHLORIDE 10 MG/1
10 CAPSULE ORAL AT BEDTIME
Status: DISCONTINUED | OUTPATIENT
Start: 2025-03-24 | End: 2025-03-27 | Stop reason: HOSPADM

## 2025-03-24 RX ORDER — MAGNESIUM SULFATE 1 G/100ML
1 INJECTION INTRAVENOUS ONCE
Status: COMPLETED | OUTPATIENT
Start: 2025-03-24 | End: 2025-03-24

## 2025-03-24 RX ORDER — IOPAMIDOL 755 MG/ML
67 INJECTION, SOLUTION INTRAVASCULAR ONCE
Status: COMPLETED | OUTPATIENT
Start: 2025-03-24 | End: 2025-03-24

## 2025-03-24 RX ORDER — ONDANSETRON 2 MG/ML
4 INJECTION INTRAMUSCULAR; INTRAVENOUS EVERY 6 HOURS PRN
Status: CANCELLED | OUTPATIENT
Start: 2025-03-24

## 2025-03-24 RX ORDER — CALCIUM CARBONATE 500 MG/1
1000 TABLET, CHEWABLE ORAL 4 TIMES DAILY PRN
Status: DISCONTINUED | OUTPATIENT
Start: 2025-03-24 | End: 2025-03-27 | Stop reason: HOSPADM

## 2025-03-24 RX ORDER — MAGNESIUM SULFATE HEPTAHYDRATE 500 MG/ML
1 INJECTION, SOLUTION INTRAMUSCULAR; INTRAVENOUS ONCE
Status: DISCONTINUED | OUTPATIENT
Start: 2025-03-24 | End: 2025-03-24

## 2025-03-24 RX ORDER — ONDANSETRON 2 MG/ML
4 INJECTION INTRAMUSCULAR; INTRAVENOUS EVERY 6 HOURS PRN
Status: DISCONTINUED | OUTPATIENT
Start: 2025-03-24 | End: 2025-03-27 | Stop reason: HOSPADM

## 2025-03-24 RX ORDER — AMOXICILLIN 250 MG
2 CAPSULE ORAL 2 TIMES DAILY PRN
Status: DISCONTINUED | OUTPATIENT
Start: 2025-03-24 | End: 2025-03-27 | Stop reason: HOSPADM

## 2025-03-24 RX ADMIN — IOPAMIDOL 67 ML: 755 INJECTION, SOLUTION INTRAVENOUS at 17:19

## 2025-03-24 RX ADMIN — MAGNESIUM SULFATE IN DEXTROSE 1 G: 10 INJECTION, SOLUTION INTRAVENOUS at 20:40

## 2025-03-24 RX ADMIN — SODIUM CHLORIDE 1000 ML: 0.9 INJECTION, SOLUTION INTRAVENOUS at 17:03

## 2025-03-24 RX ADMIN — SODIUM CHLORIDE, SODIUM LACTATE, POTASSIUM CHLORIDE, AND CALCIUM CHLORIDE 1000 ML: .6; .31; .03; .02 INJECTION, SOLUTION INTRAVENOUS at 16:46

## 2025-03-24 RX ADMIN — NORTRIPTYLINE HYDROCHLORIDE 10 MG: 10 CAPSULE ORAL at 21:57

## 2025-03-24 RX ADMIN — CEFTRIAXONE 2 G: 2 INJECTION, POWDER, FOR SOLUTION INTRAMUSCULAR; INTRAVENOUS at 17:56

## 2025-03-24 RX ADMIN — PANTOPRAZOLE SODIUM 20 MG: 20 TABLET, DELAYED RELEASE ORAL at 21:57

## 2025-03-24 RX ADMIN — ASPIRIN 81 MG: 81 TABLET ORAL at 21:57

## 2025-03-24 RX ADMIN — SODIUM CHLORIDE 100 ML: 9 INJECTION, SOLUTION INTRAVENOUS at 17:19

## 2025-03-24 ASSESSMENT — ACTIVITIES OF DAILY LIVING (ADL)
ADLS_ACUITY_SCORE: 43
ADLS_ACUITY_SCORE: 43
ADLS_ACUITY_SCORE: 26
ADLS_ACUITY_SCORE: 43
ADLS_ACUITY_SCORE: 26
ADLS_ACUITY_SCORE: 43

## 2025-03-24 ASSESSMENT — COLUMBIA-SUICIDE SEVERITY RATING SCALE - C-SSRS
1. IN THE PAST MONTH, HAVE YOU WISHED YOU WERE DEAD OR WISHED YOU COULD GO TO SLEEP AND NOT WAKE UP?: NO
2. HAVE YOU ACTUALLY HAD ANY THOUGHTS OF KILLING YOURSELF IN THE PAST MONTH?: NO
6. HAVE YOU EVER DONE ANYTHING, STARTED TO DO ANYTHING, OR PREPARED TO DO ANYTHING TO END YOUR LIFE?: NO

## 2025-03-24 NOTE — ED NOTES
"Patient is a poor historian of recent events and symptoms. He originally describes dizziness for two weeks, but when asked if he feels like the room is spinning he answers about how he has had two surgeries on his left foot. His wife states he has been more lightheaded than dizzy. He has had increasing urgency and incontinence, this happened a year ago as well but self resolved. His wife states they ran errands today and when he didn't come in from the car she went back outside and found him on all fours in the driveway. Patient denies any memory of falling before, during, or immediately after the fall. He states he has no injuries from fall but it becomes clear he is speaking about an old injury form falling off a ladder many years ago. His wife informs writer that he complained of right sided neck pain and head pain. He is unable to state where his head hurts at this time. No cervical spine tenderness. HR is slightly elevated. Lungs clear. Denies feeling sick or recent illness. Pulses and cap refill are normal. Muscle strength in extremities is normal but he is unable to hold himself up in a seated position and falls back without help. This weakness is new for him. He is oriented to self, date, situation, but is unable to state he is in a hospital and instead says \"doctor's office or place for care.\" Patient falling asleep during initial assessment.   "

## 2025-03-24 NOTE — ED PROVIDER NOTES
History     Chief Complaint   Patient presents with    Dizziness     One week    Fall     Falling today    Incontinence     today     HPI  Arley Lopez is a 74 year old male with history of hypertension (amlodipine, hydrochlorothiazide, metoprolol), GERD (omeprazole), who presents with concern of dizziness and fall.  Has had issues with dizziness in the past and has had workup with ENT as well as cardiology.  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  He is unclear of which side that it is on.  No surgical intervention planned at this time.  Is on low-dose aspirin and a statin.  Has been feeling more dizzy this past week.  Phone contact with his primary provider and they discontinued losartan.  Has not had this for 3 days.  He says about that time he has had increased urinary frequency and a sense of urgency to urinate.  No dysuria however.  Was out shopping with his wife this morning.  She says that she drove because he had been feeling dizzy.  Felt need to urinate on the way home and could not hold and had incontinence.  This is a new issue for him.  He does have previous issues with urinary urgency and said he saw a urologist about a year ago.  Was placed on a medication but this was discontinued continued and he seemed to be doing okay since then.  He did not bring his medication list with him but he is on 2 blood pressure medications, omeprazole, atorvastatin and low-dose aspirin.  Independent history obtained from patient's wife who is at bedside.    When getting out of the car after shopping trip wife got into the house he was on his hands and knees on the driveway.  Unable to get up and neighbor had to help him get into the house.  Was too weak and lightheaded to dress himself.  Wife had to dress him.  Also put him in the shower on the bench to help him get cleaned up.  Wife tells me that he has had trouble finding his words but this has been ongoing for approximately 1 year.  Also had fall this  morning.  No reported seizure activity or history of seizure.    The patient's PMHx, Surgical Hx, Allergies, and Medications were all reviewed with the patient.    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  Mentions dizziness, memory issues, hypertension and hematuria.  Recommendation for CTA of head and neck and ENT neurology consultation.    PCP note from 5/14/2024 reviewed.  Lightheadedness discussed.  Memory issues discussed.  Urinary frequency discussed.  Additional PCP notes reviewed going back to 2021.  Do not see any mention of leukocytosis.  Is mention of taking Xifaxan by GI for ongoing chronic diarrhea.    Allergies:  Allergies   Allergen Reactions    Penicillin G Hives and Swelling       Problem List:    Patient Active Problem List    Diagnosis Date Noted    Urinary tract infection without hematuria, site unspecified 03/24/2025     Priority: Medium    Sepsis without acute organ dysfunction, due to unspecified organism (H) 03/24/2025     Priority: Medium    Lactose intolerance 11/15/2019     Priority: Medium    Irritable bowel syndrome with diarrhea 12/07/2015     Priority: Medium    CARDIOVASCULAR SCREENING; LDL GOAL LESS THAN 130 02/27/2013     Priority: Medium    GERD (gastroesophageal reflux disease) 07/12/2012     Priority: Medium    Essential hypertension 07/12/2012     Priority: Medium        Past Medical History:    Past Medical History:   Diagnosis Date    Acute calculous cholecystitis 11/23/2019    Acute cholecystitis 11/23/2019    Essential hypertension     GERD (gastroesophageal reflux disease)     Kidney problem        Past Surgical History:    Past Surgical History:   Procedure Laterality Date    COLONOSCOPY  May 2012    abnormal !    ENDOSCOPY  01/10/2011    Reflux duodenum, Hiatal hernia small, otherwise normal exam.    FOOT SURGERY Left     GENITOURINARY SURGERY      vasectomy    GENITOURINARY SURGERY      spermacele  "   LAPAROSCOPIC CHOLECYSTECTOMY N/A 2019    Procedure: CHOLECYSTECTOMY, LAPAROSCOPIC;  Surgeon: Yehuda Puga MD;  Location: [facility] OR       Family History:    Family History   Problem Relation Age of Onset    Cancer Mother     Rheumatic fever Mother     Suicide Father        Social History:  Marital Status:   [2]  Social History     Tobacco Use    Smoking status: Former     Current packs/day: 0.00     Average packs/day: 2.0 packs/day for 2.0 years (4.0 ttl pk-yrs)     Types: Cigarettes     Start date: 1990     Quit date: 1992     Years since quittin.2    Smokeless tobacco: Never   Substance Use Topics    Alcohol use: Yes     Comment: beer per monthly    Drug use: No        Medications:    amLODIPine (NORVASC) 5 MG tablet  ASPIRIN 81 PO  atorvastatin (LIPITOR) 20 MG tablet  metoprolol succinate ER (TOPROL-XL) 50 MG 24 hr tablet  nortriptyline (PAMELOR) 10 MG capsule  omeprazole (PRILOSEC) 20 MG DR capsule          Review of Systems  Pertinent positives and negatives mentioned in HPI    Physical Exam   BP: (!) 164/74  Pulse: 106  Temp: 97.4  F (36.3  C)  Resp: 16  Height: 170.2 cm (5' 7\")  Weight: 77.9 kg (171 lb 12.8 oz)  SpO2: 98 %    GEN: Awake and alert.  HENT: Oral mucosa dry.  No wound on tongue.    EYES: EOM intact. Conjunctiva clear. No discharge.  No RAPD  NECK: Symmetric, freely mobile.  No midline tenderness  CV : Regular rate and rhythm.  PULM: Normal effort. No wheezes, rales, or rhonchi bilaterally.  ABD: Soft, non-tender, non-distended. No rebound or guarding.   NEURO: Normal speech.  Following commands in all extremities.  No focal motor or sensory deficit in upper or lower extremities.  Finger-to-nose heel-to-shin intact.    EXT: No gross deformity. Warm and well perfused.  INT: Warm. No diaphoresis. Normal color.        ED Course        Procedures         EKG: Interpreted by Ervin Olson MD sinus rhythm with rate of 105 bpm.  Normal R wave progression.  Normal intervals.  " No ST segment elevations or depressions.  No previous ECG for comparison.  Pression sinus tachycardia with no evidence of acute ischemia.    Critical Care time:  30 minutes     IV Antibiotics given and/or elevated Lactate of 1.2 and no sepsis note found - Delete this reminder and enter the sepsis note or '.edcms' before signing chart.>>>The patient has signs of sepsis    :[id number]}  Sepsis ED evaluation   The patient has signs of sepsis as evidenced by:  1. Presence of 2 SIRS criteria, suspected infection, AND  2. Organ dysfunction: Sepsis work up in progress. Will continue to monitor for signs of organ dysfunction    Sepsis Care Initiation: Starting at  4 PM on 03/24/25, until specified. Prior to this documentation, sepsis, severe sepsis, or septic shock was NOT thought to be a significant cause of illness. This order represents the first time infection was seriously considered to be affecting the patient.    Lactic Acid Results:  Recent Labs   Lab Test 03/24/25  1703 11/23/19  1752   LACT 1.2 1.1       3 Hour Bundle 6 Hour Bundle (Reassessment)   Blood Cultures before IV Antibiotics: Yes  Antibiotics given: see below  Prehospital fluid volume (mL):                     Total fluids given (ED +Pre-hospital):  The patient responded to a lesser volume of IV fluids. The initial volume ordered was 1000 mL.    Repeat Lactic Acid Level: Ordered by reflex for 2 hours after initial lactic acid collection.  Vasopressors: MAP>65 after initial IVF bolus, will continue to monitor fluid status and vital signs.  Repeat perfusion exam: I attest to having performed a repeat sepsis exam and assessment of perfusion at 7:53 PM .   BMI Readings from Last 1 Encounters:   03/24/25 26.91 kg/m        Anti-infectives (From admission through now)      Start     Dose/Rate Route Frequency Ordered Stop    03/24/25 4378  cefTRIAXone (ROCEPHIN) 2 g vial to attach to  ml bag for ADULTS or NS 50 ml bag for PEDS         2 g  over 30 Minutes  Intravenous ONCE 03/24/25 1650 03/24/25 1826                     Results for orders placed or performed during the hospital encounter of 03/24/25 (from the past 24 hours)   CBC with platelets, differential    Narrative    The following orders were created for panel order CBC with platelets, differential.  Procedure                               Abnormality         Status                     ---------                               -----------         ------                     CBC with platelets and ...[[id number]]  Abnormal            Final result               RBC and Platelet Morpho...[[id number]]                      Final result               Manual Differential[[id number]]         Abnormal            Final result                 Please view results for these tests on the individual orders.   Comprehensive metabolic panel   Result Value Ref Range    Sodium 140 135 - 145 mmol/L    Potassium 3.9 3.4 - 5.3 mmol/L    Carbon Dioxide (CO2) 24 22 - 29 mmol/L    Anion Gap 15 7 - 15 mmol/L    Urea Nitrogen 20.9 8.0 - 23.0 mg/dL    Creatinine 1.45 (H) 0.67 - 1.17 mg/dL    GFR Estimate 51 (L) >60 mL/min/1.73m2    Calcium 9.4 8.8 - 10.4 mg/dL    Chloride 101 98 - 107 mmol/L    Glucose 117 (H) 70 - 99 mg/dL    Alkaline Phosphatase 141 40 - 150 U/L    AST 22 0 - 45 U/L    ALT 23 0 - 70 U/L    Protein Total 7.7 6.4 - 8.3 g/dL    Albumin 4.4 3.5 - 5.2 g/dL    Bilirubin Total 0.9 <=1.2 mg/dL   [facility] Draw    Narrative    The following orders were created for panel order [facility].  Procedure                               Abnormality         Status                     ---------                               -----------         ------                     Extra Blue Top Tube[[id number]]                             Final result               Extra Red Top Tube[[id number]]                              Final result                 Please view results for these tests on the individual orders.   CBC with platelets and differential    Result Value Ref Range    WBC Count 26.2 (H) 4.0 - 11.0 10e3/uL    RBC Count 4.38 (L) 4.40 - 5.90 10e6/uL    Hemoglobin 13.0 (L) 13.3 - 17.7 g/dL    Hematocrit 38.4 (L) 40.0 - 53.0 %    MCV 88 78 - 100 fL    MCH 29.7 26.5 - 33.0 pg    MCHC 33.9 31.5 - 36.5 g/dL    RDW 13.0 10.0 - 15.0 %    Platelet Count 208 150 - 450 10e3/uL   Extra Blue Top Tube   Result Value Ref Range    Hold Specimen JIC    Extra Red Top Tube   Result Value Ref Range    Hold Specimen JIC    Manual Differential   Result Value Ref Range    % Neutrophils 81 %    % Lymphocytes 7 %    % Monocytes 12 %    % Eosinophils 0 %    % Basophils 0 %    Absolute Neutrophils 21.2 (H) 1.6 - 8.3 10e3/uL    Absolute Lymphocytes 1.8 0.8 - 5.3 10e3/uL    Absolute Monocytes 3.1 (H) 0.0 - 1.3 10e3/uL    Absolute Eosinophils 0.0 0.0 - 0.7 10e3/uL    Absolute Basophils 0.0 0.0 - 0.2 10e3/uL   RBC and Platelet Morphology   Result Value Ref Range    RBC Morphology Confirmed RBC Indices     Platelet Assessment  Automated Count Confirmed. Platelet morphology is normal.     Automated Count Confirmed. Platelet morphology is normal.   Troponin T, High Sensitivity   Result Value Ref Range    Troponin T, High Sensitivity 14 <=22 ng/L   TSH with free T4 reflex   Result Value Ref Range    TSH 1.58 0.30 - 4.20 uIU/mL   Magnesium   Result Value Ref Range    Magnesium 1.4 (L) 1.7 - 2.3 mg/dL   UA with Microscopic reflex to Culture    Specimen: Urine, Clean Catch   Result Value Ref Range    Color Urine Yellow Colorless, Straw, Light Yellow, Yellow    Appearance Urine Slightly Cloudy (A) Clear    Glucose Urine 50 (A) Negative mg/dL    Bilirubin Urine Negative Negative    Ketones Urine Negative Negative mg/dL    Specific Gravity Urine 1.021 1.003 - 1.035    Blood Urine Small (A) Negative    pH Urine 5.5 5.0 - 7.0    Protein Albumin Urine 20 (A) Negative mg/dL    Urobilinogen Urine Normal Normal mg/dL    Nitrite Urine Negative Negative    Leukocyte Esterase Urine Large (A) Negative     RBC Urine 6 (H) <=2 /HPF    WBC Urine 179 (H) <=5 /HPF    Hyaline Casts Urine 1 <=2 /LPF    Narrative    Urine Culture ordered based on laboratory criteria   EKG 12-lead, tracing only   Result Value Ref Range    Systolic Blood Pressure  mmHg    Diastolic Blood Pressure  mmHg    Ventricular Rate 105 BPM    Atrial Rate 105 BPM    IA Interval 184 ms    QRS Duration 92 ms     ms    QTc 430 ms    P Axis 54 degrees    R AXIS -13 degrees    T Axis 49 degrees    Interpretation ECG       Sinus tachycardia  Otherwise normal ECG  No previous ECGs available     Lactic Acid Whole Blood with 1X Repeat in 2 HR when >2   Result Value Ref Range    Lactic Acid, Initial 1.2 0.7 - 2.0 mmol/L   Head CT w/o contrast    Narrative    EXAM: CT HEAD W/O CONTRAST, CT CERVICAL SPINE W/O CONTRAST  LOCATION: [facility]  DATE: 3/24/2025    INDICATION: Head and neck injury  COMPARISON: None.  TECHNIQUE:   1) Routine CT Head without IV contrast. Multiplanar reformats. Dose reduction techniques were used.  2) Routine CT Cervical Spine without IV contrast. Multiplanar reformats. Dose reduction techniques were used.    FINDINGS:   HEAD CT:   INTRACRANIAL CONTENTS: No intracranial hemorrhage, extraaxial collection, or mass effect.  No CT evidence of acute infarct. Moderate presumed chronic small vessel ischemic changes. Mild generalized volume loss. No hydrocephalus.     VISUALIZED ORBITS/SINUSES/MASTOIDS: No intraorbital abnormality. No paranasal sinus mucosal disease. No middle ear or mastoid effusion.    BONES/SOFT TISSUES: No acute abnormality.    CERVICAL SPINE CT:   VERTEBRA: Normal vertebral body heights and alignment. No fracture or posttraumatic subluxation.     CANAL/FORAMINA: No canal or neural foraminal stenosis.    PARASPINAL: No extraspinal abnormality. Visualized lung fields are clear.      Impression    IMPRESSION:  HEAD CT:  1.  No acute intracranial process.    CERVICAL SPINE CT:  1.  No CT evidence  for acute fracture or post traumatic subluxation.   CT Cervical Spine w/o Contrast    Narrative    EXAM: CT HEAD W/O CONTRAST, CT CERVICAL SPINE W/O CONTRAST  LOCATION: [facility]  DATE: 3/24/2025    INDICATION: Head and neck injury  COMPARISON: None.  TECHNIQUE:   1) Routine CT Head without IV contrast. Multiplanar reformats. Dose reduction techniques were used.  2) Routine CT Cervical Spine without IV contrast. Multiplanar reformats. Dose reduction techniques were used.    FINDINGS:   HEAD CT:   INTRACRANIAL CONTENTS: No intracranial hemorrhage, extraaxial collection, or mass effect.  No CT evidence of acute infarct. Moderate presumed chronic small vessel ischemic changes. Mild generalized volume loss. No hydrocephalus.     VISUALIZED ORBITS/SINUSES/MASTOIDS: No intraorbital abnormality. No paranasal sinus mucosal disease. No middle ear or mastoid effusion.    BONES/SOFT TISSUES: No acute abnormality.    CERVICAL SPINE CT:   VERTEBRA: Normal vertebral body heights and alignment. No fracture or posttraumatic subluxation.     CANAL/FORAMINA: No canal or neural foraminal stenosis.    PARASPINAL: No extraspinal abnormality. Visualized lung fields are clear.      Impression    IMPRESSION:  HEAD CT:  1.  No acute intracranial process.    CERVICAL SPINE CT:  1.  No CT evidence for acute fracture or post traumatic subluxation.   CTA Head Neck with Contrast    Narrative    EXAM: CTA HEAD NECK W CONTRAST  LOCATION: [facility]  DATE: 3/24/2025    INDICATION: right sided neck pain in setting of fall x two today. reports hx of total ICA occlusion but unknown side  COMPARISON: None.  CONTRAST: 67 ml Isovue 370  TECHNIQUE: Head and neck CT angiogram with IV contrast. Axial helical CT images of the head and neck vessels obtained during the arterial phase of intravenous contrast administration. Axial 2D reconstructed images and multiplanar 3D MIP reconstructed   images of the  head and neck vessels were performed by the technologist. Dose reduction techniques were used. All stenosis measurements made according to NASCET criteria unless otherwise specified.    FINDINGS:   HEAD CTA:  ANTERIOR CIRCULATION: Mild atherosclerotic changes cavernous and supraclinoid ICAs bilaterally. Hypoplastic right A1 with right A2 supplied in part from left anterior circulation.    POSTERIOR CIRCULATION: Short segment moderate narrowing proximal left P2. Dominant left and smaller right vertebral artery contribute to a normal basilar artery.     DURAL VENOUS SINUSES: Expected enhancement of the major dural venous sinuses.    NECK CTA:  RIGHT CAROTID: No measurable stenosis or dissection.    LEFT CAROTID: No measurable stenosis or dissection.    VERTEBRAL ARTERIES: Normal dominant left vertebral artery. Normal basilar artery. Only very faint and segmental visualization of nondominant right vertebral artery; this is suggestive of marked narrowing to occlusion, of uncertain acuity. Dominant left   vertebral artery.    AORTIC ARCH: Classic aortic arch anatomy with no significant stenosis at the origin of the great vessels.    NONVASCULAR STRUCTURES: 9 mm nodule left thyroid gland.      Impression    IMPRESSION:     HEAD CTA:   1.  Mild atherosclerotic changes cavernous and supraclinoid ICAs bilaterally.  2.  Short segment moderate narrowing proximal left P2.  3.  Intracranial circulation appears otherwise normal.    NECK CTA:  1.  No hemodynamically significant narrowing in either ICA.  2.  Normal dominant left vertebral artery.   3.  Normal basilar artery.   4.  Only very faint and segmental visualization of nondominant right vertebral artery; this is suggestive of marked narrowing to occlusion, of uncertain acuity.     Troponin T, High Sensitivity   Result Value Ref Range    Troponin T, High Sensitivity 14 <=22 ng/L       Medications   magnesium sulfate injection 1 g (has no administration in time range)    iopamidol (ISOVUE-370) solution 67 mL (67 mLs Intravenous $Given 3/24/25 0951)   sodium chloride 0.9 % bag 500mL for CT scan flush use (100 mLs As instructed $Given 3/24/25 7021)   cefTRIAXone (ROCEPHIN) 2 g vial to attach to  ml bag for ADULTS or NS 50 ml bag for PEDS (2 g Intravenous $New Bag 3/24/25 6613)   sodium chloride 0.9% BOLUS 1,000 mL (1,000 mLs Intravenous $New Bag 3/24/25 1701)       Assessments & Plan (with Medical Decision Making)   74 year old male with past medical history of postural dizziness, memory issues, word finding difficulty, with 3 days of urinary urgency, incontinence and 2 falls detailed in HPI.    Nonfocal neurologic exam.  Patient does have expressive aphasia but this is well-documented and not new per patient or his wife.  Current dizzy symptoms have been ongoing for the past week.  No stigmata of seizures or reported seizure activity.  Lactic acid is not elevated.  Unlikely to represent seizure.  CT scan of head without acute intracranial pathology.  Patient did mention occlusion of carotid artery but did not know which side this was.  Is complaining today of right sided neck pain therefore CTA of head and neck was obtained and no critical stenosis or large vessel occlusion noted.  Likely right-sided vertebral artery occlusion of unclear chronicity.  Based on history favored to be chronic and what patient mentioned earlier.  Was started on low-dose aspirin and atorvastatin at that time.  CT cervical spine without acute bony abnormality or traumatic subluxation.     Heart rate elevated to the low 100s.  No hypotension or fever.  No hypoxia or tachypnea.  CBC with leukocytosis with left shift.  CMP with normal electrolytes.  Creatinine 1.45.  Similar elevation through care everywhere a few months ago.  Urinalysis suggestive of acute infection.  Given his tachycardia with meets criteria for sepsis.  Blood and urine cultures pending.  2 g IV ceftriaxone given.  1 g IV magnesium  given for hypomagnesemia.  Will admit to hospital for further evaluation and management.  Case discussed with Liset Ballard of the hospitalist service who accepted admission.  Transition orders placed.           I have reviewed the nursing notes.         New Prescriptions    No medications on file       Final diagnoses:   Sepsis without acute organ dysfunction, due to unspecified organism (H)   Urinary tract infection without hematuria, site unspecified   Hypomagnesemia     Ervin Olson MD        3/24/2025   [facility] EMERGENCY DEPT    Disclaimer: This note consists of words and symbols derived from keyboarding and dictation using voice recognition software.  As a result, there may be errors that have gone undetected.  Please consider this when interpreting information found in this note.               Ervin Olson MD  03/24/25 1958

## 2025-03-24 NOTE — ED TRIAGE NOTES
Pt reports feeling dizzy for one week. Pt fell today and had urinary incontinence. Pt's doctor said to stop losartan because of the dizziness.      Triage Assessment (Adult)       Row Name 03/24/25 6690          Triage Assessment    Airway WDL WDL        Respiratory WDL    Respiratory WDL WDL        Cardiac WDL    Cardiac WDL WDL        Peripheral/Neurovascular WDL    Peripheral Neurovascular WDL WDL        Cognitive/Neuro/Behavioral WDL    Cognitive/Neuro/Behavioral WDL WDL

## 2025-03-24 NOTE — MEDICATION SCRIBE - ADMISSION MEDICATION HISTORY
Medication Scribe Admission Medication History    Admission medication history is complete. The information provided in this note is only as accurate as the sources available at the time of the update.    Information Source(s): Patient and CareEverywhere/SureScripts via in-person    Pertinent Information: PTA med list reviewed with patient in room with spouse and finished at desk.  He was told by his doctor to stop taking Losartan 100 mg daily last Thursday due to bottom number of blood pressure being too low and dizziness.  Was supposed to see doctor today or tomorrow to discuss Losartan.    Changes made to PTA medication list:  Added:   Aspirin 81 mg  Atorvastatin 20 mg    Deleted:   Hydrochlorothiazide 12.5 mg from 2020    Changed: None    Allergies reviewed with patient and updates made in EHR: yes, no change.    Medication History Completed By: Mariely Nagy 3/24/2025 5:57 PM    PTA Med List   Medication Sig Last Dose/Taking    amLODIPine (NORVASC) 5 MG tablet Take 1 tablet by mouth once daily for blood pressure 3/24/2025 Morning    ASPIRIN 81 PO Take 1 tablet by mouth every evening. 3/23/2025 Evening    atorvastatin (LIPITOR) 20 MG tablet Take 1 tablet by mouth daily. Taking    metoprolol succinate ER (TOPROL-XL) 50 MG 24 hr tablet Take 1 tablet by mouth once daily for blood pressure 3/24/2025 Morning    nortriptyline (PAMELOR) 10 MG capsule Take 1 capsule by mouth at bedtime 3/23/2025 Bedtime    omeprazole (PRILOSEC) 20 MG DR capsule Take 1 capsule (20 mg) by mouth 2 times daily For stomach. 3/24/2025 Morning       Opt out

## 2025-03-25 ENCOUNTER — APPOINTMENT (OUTPATIENT)
Dept: PHYSICAL THERAPY | Facility: CLINIC | Age: 75
DRG: 871 | End: 2025-03-25
Payer: COMMERCIAL

## 2025-03-25 ENCOUNTER — APPOINTMENT (OUTPATIENT)
Dept: OCCUPATIONAL THERAPY | Facility: CLINIC | Age: 75
DRG: 871 | End: 2025-03-25
Payer: COMMERCIAL

## 2025-03-25 LAB
ANION GAP SERPL CALCULATED.3IONS-SCNC: 12 MMOL/L (ref 7–15)
BACTERIA UR CULT: NORMAL
BASE EXCESS BLDV CALC-SCNC: 0.6 MMOL/L (ref -3–3)
BASOPHILS # BLD AUTO: 0.1 10E3/UL (ref 0–0.2)
BASOPHILS # BLD AUTO: 0.1 10E3/UL (ref 0–0.2)
BASOPHILS NFR BLD AUTO: 0 %
BASOPHILS NFR BLD AUTO: 0 %
BUN SERPL-MCNC: 15.5 MG/DL (ref 8–23)
CALCIUM SERPL-MCNC: 8.8 MG/DL (ref 8.8–10.4)
CHLORIDE SERPL-SCNC: 106 MMOL/L (ref 98–107)
CREAT SERPL-MCNC: 1.14 MG/DL (ref 0.67–1.17)
EGFRCR SERPLBLD CKD-EPI 2021: 68 ML/MIN/1.73M2
EOSINOPHIL # BLD AUTO: 0 10E3/UL (ref 0–0.7)
EOSINOPHIL # BLD AUTO: 0 10E3/UL (ref 0–0.7)
EOSINOPHIL NFR BLD AUTO: 0 %
EOSINOPHIL NFR BLD AUTO: 0 %
ERYTHROCYTE [DISTWIDTH] IN BLOOD BY AUTOMATED COUNT: 12.9 % (ref 10–15)
ERYTHROCYTE [DISTWIDTH] IN BLOOD BY AUTOMATED COUNT: 13 % (ref 10–15)
FOLATE SERPL-MCNC: 4.6 NG/ML (ref 4.6–34.8)
GLUCOSE SERPL-MCNC: 117 MG/DL (ref 70–99)
HCO3 BLDV-SCNC: 24 MMOL/L (ref 21–28)
HCO3 SERPL-SCNC: 22 MMOL/L (ref 22–29)
HCT VFR BLD AUTO: 35.1 % (ref 40–53)
HCT VFR BLD AUTO: 39.2 % (ref 40–53)
HGB BLD-MCNC: 11.7 G/DL (ref 13.3–17.7)
HGB BLD-MCNC: 13.1 G/DL (ref 13.3–17.7)
IMM GRANULOCYTES # BLD: 0.2 10E3/UL
IMM GRANULOCYTES # BLD: 0.2 10E3/UL
IMM GRANULOCYTES NFR BLD: 1 %
IMM GRANULOCYTES NFR BLD: 1 %
LYMPHOCYTES # BLD AUTO: 1.7 10E3/UL (ref 0.8–5.3)
LYMPHOCYTES # BLD AUTO: 2.3 10E3/UL (ref 0.8–5.3)
LYMPHOCYTES NFR BLD AUTO: 12 %
LYMPHOCYTES NFR BLD AUTO: 8 %
MAGNESIUM SERPL-MCNC: 1.6 MG/DL (ref 1.7–2.3)
MCH RBC QN AUTO: 29.4 PG (ref 26.5–33)
MCH RBC QN AUTO: 29.7 PG (ref 26.5–33)
MCHC RBC AUTO-ENTMCNC: 33.3 G/DL (ref 31.5–36.5)
MCHC RBC AUTO-ENTMCNC: 33.4 G/DL (ref 31.5–36.5)
MCV RBC AUTO: 88 FL (ref 78–100)
MCV RBC AUTO: 89 FL (ref 78–100)
MONOCYTES # BLD AUTO: 1.6 10E3/UL (ref 0–1.3)
MONOCYTES # BLD AUTO: 2 10E3/UL (ref 0–1.3)
MONOCYTES NFR BLD AUTO: 8 %
MONOCYTES NFR BLD AUTO: 9 %
NEUTROPHILS # BLD AUTO: 15.3 10E3/UL (ref 1.6–8.3)
NEUTROPHILS # BLD AUTO: 17.1 10E3/UL (ref 1.6–8.3)
NEUTROPHILS NFR BLD AUTO: 79 %
NEUTROPHILS NFR BLD AUTO: 81 %
NRBC # BLD AUTO: 0 10E3/UL
NRBC # BLD AUTO: 0 10E3/UL
NRBC BLD AUTO-RTO: 0 /100
NRBC BLD AUTO-RTO: 0 /100
O2/TOTAL GAS SETTING VFR VENT: 21 %
OXYHGB MFR BLDV: 90 % (ref 70–75)
PCO2 BLDV: 35 MM HG (ref 40–50)
PH BLDV: 7.45 [PH] (ref 7.32–7.43)
PLAT MORPH BLD: ABNORMAL
PLAT MORPH BLD: NORMAL
PLATELET # BLD AUTO: 167 10E3/UL (ref 150–450)
PLATELET # BLD AUTO: 168 10E3/UL (ref 150–450)
PO2 BLDV: 57 MM HG (ref 25–47)
POTASSIUM SERPL-SCNC: 3.7 MMOL/L (ref 3.4–5.3)
RBC # BLD AUTO: 3.98 10E6/UL (ref 4.4–5.9)
RBC # BLD AUTO: 4.41 10E6/UL (ref 4.4–5.9)
RBC MORPH BLD: ABNORMAL
RBC MORPH BLD: NORMAL
SAO2 % BLDV: 91.7 % (ref 70–75)
SODIUM SERPL-SCNC: 140 MMOL/L (ref 135–145)
TOXIC GRANULES BLD QL SMEAR: PRESENT
VIT B12 SERPL-MCNC: 303 PG/ML (ref 232–1245)
WBC # BLD AUTO: 19.4 10E3/UL (ref 4–11)
WBC # BLD AUTO: 21.1 10E3/UL (ref 4–11)

## 2025-03-25 PROCEDURE — 250N000013 HC RX MED GY IP 250 OP 250 PS 637

## 2025-03-25 PROCEDURE — 97530 THERAPEUTIC ACTIVITIES: CPT | Mod: GP | Performed by: PHYSICAL THERAPIST

## 2025-03-25 PROCEDURE — 80048 BASIC METABOLIC PNL TOTAL CA: CPT

## 2025-03-25 PROCEDURE — 97165 OT EVAL LOW COMPLEX 30 MIN: CPT | Mod: GO

## 2025-03-25 PROCEDURE — 85025 COMPLETE CBC W/AUTO DIFF WBC: CPT

## 2025-03-25 PROCEDURE — 99232 SBSQ HOSP IP/OBS MODERATE 35: CPT | Performed by: STUDENT IN AN ORGANIZED HEALTH CARE EDUCATION/TRAINING PROGRAM

## 2025-03-25 PROCEDURE — 82310 ASSAY OF CALCIUM: CPT

## 2025-03-25 PROCEDURE — 36415 COLL VENOUS BLD VENIPUNCTURE: CPT | Performed by: STUDENT IN AN ORGANIZED HEALTH CARE EDUCATION/TRAINING PROGRAM

## 2025-03-25 PROCEDURE — 85014 HEMATOCRIT: CPT

## 2025-03-25 PROCEDURE — 36415 COLL VENOUS BLD VENIPUNCTURE: CPT

## 2025-03-25 PROCEDURE — 258N000003 HC RX IP 258 OP 636

## 2025-03-25 PROCEDURE — 83921 ORGANIC ACID SINGLE QUANT: CPT | Performed by: STUDENT IN AN ORGANIZED HEALTH CARE EDUCATION/TRAINING PROGRAM

## 2025-03-25 PROCEDURE — 250N000013 HC RX MED GY IP 250 OP 250 PS 637: Performed by: STUDENT IN AN ORGANIZED HEALTH CARE EDUCATION/TRAINING PROGRAM

## 2025-03-25 PROCEDURE — 85014 HEMATOCRIT: CPT | Performed by: STUDENT IN AN ORGANIZED HEALTH CARE EDUCATION/TRAINING PROGRAM

## 2025-03-25 PROCEDURE — 85004 AUTOMATED DIFF WBC COUNT: CPT | Performed by: STUDENT IN AN ORGANIZED HEALTH CARE EDUCATION/TRAINING PROGRAM

## 2025-03-25 PROCEDURE — 82746 ASSAY OF FOLIC ACID SERUM: CPT

## 2025-03-25 PROCEDURE — 258N000003 HC RX IP 258 OP 636: Performed by: STUDENT IN AN ORGANIZED HEALTH CARE EDUCATION/TRAINING PROGRAM

## 2025-03-25 PROCEDURE — 250N000011 HC RX IP 250 OP 636

## 2025-03-25 PROCEDURE — 82805 BLOOD GASES W/O2 SATURATION: CPT

## 2025-03-25 PROCEDURE — 97161 PT EVAL LOW COMPLEX 20 MIN: CPT | Mod: GP | Performed by: PHYSICAL THERAPIST

## 2025-03-25 PROCEDURE — 120N000001 HC R&B MED SURG/OB

## 2025-03-25 PROCEDURE — 83735 ASSAY OF MAGNESIUM: CPT

## 2025-03-25 PROCEDURE — 250N000011 HC RX IP 250 OP 636: Performed by: STUDENT IN AN ORGANIZED HEALTH CARE EDUCATION/TRAINING PROGRAM

## 2025-03-25 RX ORDER — MAGNESIUM SULFATE HEPTAHYDRATE 40 MG/ML
2 INJECTION, SOLUTION INTRAVENOUS ONCE
Status: COMPLETED | OUTPATIENT
Start: 2025-03-25 | End: 2025-03-25

## 2025-03-25 RX ORDER — MULTIVITAMIN WITH IRON
1000 TABLET ORAL DAILY
Status: DISCONTINUED | OUTPATIENT
Start: 2025-03-26 | End: 2025-03-27 | Stop reason: HOSPADM

## 2025-03-25 RX ORDER — SODIUM CHLORIDE, SODIUM LACTATE, POTASSIUM CHLORIDE, CALCIUM CHLORIDE 600; 310; 30; 20 MG/100ML; MG/100ML; MG/100ML; MG/100ML
INJECTION, SOLUTION INTRAVENOUS CONTINUOUS
Status: DISCONTINUED | OUTPATIENT
Start: 2025-03-25 | End: 2025-03-25

## 2025-03-25 RX ORDER — SODIUM CHLORIDE, SODIUM LACTATE, POTASSIUM CHLORIDE, CALCIUM CHLORIDE 600; 310; 30; 20 MG/100ML; MG/100ML; MG/100ML; MG/100ML
INJECTION, SOLUTION INTRAVENOUS CONTINUOUS
Status: ACTIVE | OUTPATIENT
Start: 2025-03-25 | End: 2025-03-25

## 2025-03-25 RX ORDER — CYANOCOBALAMIN 1000 UG/ML
1000 INJECTION, SOLUTION INTRAMUSCULAR; SUBCUTANEOUS ONCE
Status: COMPLETED | OUTPATIENT
Start: 2025-03-25 | End: 2025-03-25

## 2025-03-25 RX ORDER — MAGNESIUM OXIDE 400 MG/1
400 TABLET ORAL 2 TIMES DAILY
Status: DISCONTINUED | OUTPATIENT
Start: 2025-03-25 | End: 2025-03-27 | Stop reason: HOSPADM

## 2025-03-25 RX ADMIN — PANTOPRAZOLE SODIUM 20 MG: 20 TABLET, DELAYED RELEASE ORAL at 09:21

## 2025-03-25 RX ADMIN — SODIUM CHLORIDE, SODIUM LACTATE, POTASSIUM CHLORIDE, AND CALCIUM CHLORIDE: .6; .31; .03; .02 INJECTION, SOLUTION INTRAVENOUS at 15:19

## 2025-03-25 RX ADMIN — NORTRIPTYLINE HYDROCHLORIDE 10 MG: 10 CAPSULE ORAL at 22:07

## 2025-03-25 RX ADMIN — PANTOPRAZOLE SODIUM 20 MG: 20 TABLET, DELAYED RELEASE ORAL at 19:42

## 2025-03-25 RX ADMIN — AMLODIPINE BESYLATE 5 MG: 5 TABLET ORAL at 09:22

## 2025-03-25 RX ADMIN — Medication 400 MG: at 19:42

## 2025-03-25 RX ADMIN — SODIUM CHLORIDE, SODIUM LACTATE, POTASSIUM CHLORIDE, AND CALCIUM CHLORIDE: .6; .31; .03; .02 INJECTION, SOLUTION INTRAVENOUS at 09:24

## 2025-03-25 RX ADMIN — SODIUM CHLORIDE, SODIUM LACTATE, POTASSIUM CHLORIDE, AND CALCIUM CHLORIDE 1000 ML: .6; .31; .03; .02 INJECTION, SOLUTION INTRAVENOUS at 00:09

## 2025-03-25 RX ADMIN — CYANOCOBALAMIN 1000 MCG: 1000 INJECTION, SOLUTION INTRAMUSCULAR; SUBCUTANEOUS at 16:16

## 2025-03-25 RX ADMIN — MAGNESIUM SULFATE HEPTAHYDRATE 2 G: 40 INJECTION, SOLUTION INTRAVENOUS at 09:22

## 2025-03-25 RX ADMIN — METOPROLOL SUCCINATE 50 MG: 50 TABLET, EXTENDED RELEASE ORAL at 09:21

## 2025-03-25 RX ADMIN — ASPIRIN 81 MG: 81 TABLET ORAL at 22:07

## 2025-03-25 RX ADMIN — CEFTRIAXONE 2 G: 2 INJECTION, POWDER, FOR SOLUTION INTRAMUSCULAR; INTRAVENOUS at 16:16

## 2025-03-25 RX ADMIN — ACETAMINOPHEN 650 MG: 325 TABLET, FILM COATED ORAL at 00:19

## 2025-03-25 RX ADMIN — SODIUM CHLORIDE, SODIUM LACTATE, POTASSIUM CHLORIDE, AND CALCIUM CHLORIDE: .6; .31; .03; .02 INJECTION, SOLUTION INTRAVENOUS at 01:54

## 2025-03-25 ASSESSMENT — ACTIVITIES OF DAILY LIVING (ADL)
ADLS_ACUITY_SCORE: 38
ADLS_ACUITY_SCORE: 30
ADLS_ACUITY_SCORE: 30
ADLS_ACUITY_SCORE: 26
ADLS_ACUITY_SCORE: 26
ADLS_ACUITY_SCORE: 30
ADLS_ACUITY_SCORE: 30
ADLS_ACUITY_SCORE: 26
ADLS_ACUITY_SCORE: 30
ADLS_ACUITY_SCORE: 26
ADLS_ACUITY_SCORE: 38
ADLS_ACUITY_SCORE: 30
ADLS_ACUITY_SCORE: 26
ADLS_ACUITY_SCORE: 38
ADLS_ACUITY_SCORE: 30
ADLS_ACUITY_SCORE: 38
ADLS_ACUITY_SCORE: 38

## 2025-03-25 ASSESSMENT — ENCOUNTER SYMPTOMS
TINGLING: 0
VOMITING: 0
DYSURIA: 0
SHORTNESS OF BREATH: 0
BLURRED VISION: 0
FEVER: 0
HEADACHES: 0
CHILLS: 1
DIARRHEA: 0
PALPITATIONS: 0
COUGH: 0
FOCAL WEAKNESS: 0
MEMORY LOSS: 1
NAUSEA: 0
NECK PAIN: 1
FALLS: 1
DOUBLE VISION: 0
BACK PAIN: 0
ABDOMINAL PAIN: 0
WEAKNESS: 1
LOSS OF CONSCIOUSNESS: 0

## 2025-03-25 ASSESSMENT — LIFESTYLE VARIABLES: SUBSTANCE_ABUSE: 0

## 2025-03-25 NOTE — PROGRESS NOTES
"CLINICAL NUTRITION SERVICES - ASSESSMENT NOTE    RECOMMENDATIONS FOR MDs/PROVIDERS TO ORDER:    Registered Dietitian Interventions:  Gave single trial Ensure Plus High Protein-Sb   Encourage focus on meal first, trial in between  Discuss product/alternatives for home use, best times to use  Encourage PO to stop wt loss: loss of muscle mass, especially when age 65+, as primary concern    Future/Additional Recommendations:  F/U PO, favor for supps, schedule supps?     REASON FOR ASSESSMENT  Reason for assessment: MST 2 (reported: 2-13# wt loss [1], decreased appetite [1]    INFORMATION OBTAINED  Pt pt and daughter: perhaps 10# wt loss over last few months. Pt endorses reduced appetite and unsure why. Eating OK PTA but \"not 4 course\". He said his arms were \"saggy\" before coming in but pointed to IVF pole and stated it seemed to firm him up (clarified hydration).   Has never had nutrition shakes, would expect to prefer strawb or ahemt.    NUTRITION HISTORY  H&P: \"74 year old male with PMHx of lightheadedness, memory loss, expressive aphagia, and urge incontinence who presents on 3/24/2025 with lightheadedness, falls, and urinary incontinence. On evaluation found to have infectious encephalopathy likely 2/2 urinary tract infection. Additionally with profound weakness with chronic lightheadedness\"    CURRENT NUTRITION ORDERS  Diet: Reg    CURRENT INTAKE/TOLERANCE  Eating KRISTAN at brief visit still mostly undisturbed    LABS  Nutrition-relevant labs: Reviewed    MEDICATIONS  Nutrition-relevant medications: Reviewed note note LR running 200mL/hr    ANTHROPOMETRICS  Height: 170.2 cm (5' 7\")  Admission Weight: 77.9 kg (171 lb 12.8 oz) (03/24/25 1452)   Most Recent Weight: 76.9 kg (169 lb 8.5 oz) (03/24/25 2132) (dosing wt)  IBW: 66.1 kg  BMI: Body mass index is 26.55 kg/m .     Weight History:   03/24/25 : 76.9 kg (169 lb 8.5 oz)  circa 12/24/24 per report lost 10#: 179 lb or 81.26 kg = -5.3% wt loss w/i 3m  05/14/24 81.6 kg " (180 lb) (ChartRevOfficeVisit) = -6.1% wt loss w/i 1 y  ...  11/03/20 : 81.2 kg (179 lb)  ...  11/05/19 : 82.3 kg (181 lb 6.4 oz)  ....  07/15/14 : 87.5 kg (193 lb)  06/24/14 : 87.5 kg (193 lb)    ASSESSED NUTRITION NEEDS  Estimated Energy Needs: 1779 kcals/day ([place] [address] Jeor*1.2)  Justification: Maintenance  Estimated Protein Needs: 77 grams protein/day ( kg*1.0 )  Justification: Maintenance  Estimated Fluid Needs: 1800 mL/day (1 mL/kcal)  Justification: estimate    SYSTEM AND PHYSICAL FINDINGS    GI symptoms: WDL /flowsheet  Skin/wounds: WDL /flowsheet    MALNUTRITION  % Intake: < 75% for >/= 3 months (moderate)  % Weight Loss: Weight loss does not meet criteria   Subcutaneous Fat Loss: None observed  Muscle Loss: None observed  Fluid Accumulation/Edema: None noted  Malnutrition Diagnosis: Patient does not meet two of the established criteria necessary for diagnosing malnutrition  Malnutrition Present on Admission: No    NUTRITION DIAGNOSIS  Inadequate energy intake related to unexplained reduced appetite as evidenced by pt report    INTERVENTIONS  Gave single trial Ensure Plus High Protein-Sb   Encourage focus on meal first, trial supp in between  Discuss product/alternatives for home use, best times to use  Encourage PO to stop wt loss: loss of muscle mass, especially when age 65+, as primary concern    GOALS  Patient to consume % of nutritionally adequate meal trays TID, or the equivalent with supplements/snacks.     MONITORING/EVALUATION  Progress toward goals will be monitored and evaluated per policy.

## 2025-03-25 NOTE — PROGRESS NOTES
[facility]    Medicine Progress Note - Hospitalist Service    Date of Admission:  3/24/2025    Assessment & Plan   Arley Lopez is a 74 year old male with PMHx of lightheadedness, memory loss, expressive aphagia, and urge incontinence who presents on 3/24/2025 with lightheadedness, falls, and urinary incontinence. On evaluation found to have infectious encephalopathy likely 2/2 urinary tract infection. Additionally with profound weakness with chronic lightheadedness, will need PT/OT evaluation prior to discharging.    Update 3/25: Altered mental status improved suggesting it was related to infection/sepsis. Still borderline tachycardic and WBC ~21k without urine culture resulted. Think patient needs 1 more day, to see if HR improves +/- Wbc count (or if culture data results to ensure we can guide antimicrobial therapy).     Sepsis  Urinary tract infection (UTI)  Endorses urinary hesitancy with incontinence on admission, has been previously worked up with unremarkable urogram for incontinence. Admit UA showing pyuria (), large LE, and small blood. Afebrile with leukocytosis (WBC 26.2). Mildly tachycardic without hypotension or elevated lactate to support sepsis. Bolused with 1 liter LR PTA. Blood cultures collected and ceftriaxone initiated.   Prior Ucx 5/2024 without growth.  3/25: LR 1 liter bolus to complete 30 cc/kg fluid resuscitation followed by 125 mL/hr continuously    - mIVF finishing today  - Ceftriaxone 2 g q24hrs   - Ucx collected 3/24 with NGTD  - Bcx x 2 collected 3/24 with NGTD    Acute Metabolic Encephalopathy, resolved  Chronic memory loss  Suspect acute confusion related to current sepsis/infection   Patient initially concerned about memory loss about 18 months ago, unclear what his baseline is. On admission he is does not respond to questions appropriately and having issues with following simple tasks. CT head and CTA head unremarkable. CTA neck redemonstrated  chronic marked right vertebral artery occlusion. No finding on CMP or CBC to explain encephalopathy.  UDS negative  TSH normal  B12 <400 (~310)    - Methylmalonic acid pending, if elevated confirms B12 deficiency   - Will empirically give b12 given low risk, 1 dose injection then PO     - If deficiency confirmed, recommend rechecking b12 +/- MMA in ~3 months after po supplementation, if still low patient may need to re-visit his PPI +/- consider pernicious workup (or give injections)  - Monitor for improvement with treatment of UTI    Chronic lightheadedness (suspect POTS, recheck outpatient after infection)  Generalized weakness  Falls    Reports fall x 2 times which occurred 3/24, however does have great recollection of this. Denies head injury or LOC. Does mention having some neck pain, CT c-spine unremarkable. He is not on PTA anticoagulation. Falls seem related to chronic lightheadedness and weakness. Evaluated by ENT 8/2024 for chronic dizziness/lightheadedness with unremarkable workup.  3/25: orthostats negative for BP change but did have nearly 20 point HR increase    - recommend outpatient orthostatic vitals to assess for POTS when infection resolves  - PT/OT evaluation     Essential hypertension    Chronic. Blood pressure 164/74. Manages PTA with amlodipine 5 mg and lisinopril. Lisinopril recently placed on hold 3/20 for lightheadedness.  - Continue amlodipine    Chronic kidney disease, stage 3a    Stable. Admit creatine 1.45 and GFR 51. Baseline creatine near 1.25.     Urge incontinence    Evaluated by urology 6/5/24 with unremarkable workup with CT urogram 7/2/24. Patient reports trial of medication but did not tolerate so was discontinued.    Gastroesophageal reflux disease (GERD)    Chronic. Managed PTA with pantoprazole 20 mg BID  - Continue pantoprazole BID          Diet: Regular Diet Adult    DVT Prophylaxis: Low Risk/Ambulatory with no VTE prophylaxis indicated  Romero Catheter: Not present  Lines:  "None     Cardiac Monitoring: None  Code Status: Full Code      Clinically Significant Risk Factors Present on Admission             # Hypomagnesemia: Lowest Mg = 1.4 mg/dL in last 2 days, will replace as needed     # Drug Induced Platelet Defect: home medication list includes an antiplatelet medication   # Hypertension: Noted on problem list           # Overweight: Estimated body mass index is 26.55 kg/m  as calculated from the following:    Height as of this encounter: 1.702 m (5' 7\").    Weight as of this encounter: 76.9 kg (169 lb 8.5 oz).              Social Drivers of Health    Tobacco Use: Medium Risk (10/7/2024)    Received from [employer] [facility]    Patient History     Smoking Tobacco Use: Former     Smokeless Tobacco Use: Never          Disposition Plan     Medically Ready for Discharge: Anticipated Tomorrow             Jeronimo Barry DO  Hospitalist Service  [facility]  Securely message with [employer] (more info)  Text page via [employer] Paging/Directory   ______________________________________________________________________    Interval History   No acute events.    Physical Exam   Vital Signs: Temp: 98  F (36.7  C) Temp src: Oral BP: (!) 148/66 Pulse: 95   Resp: 18 SpO2: 98 % O2 Device: None (Room air)    Weight: 169 lbs 8.54 oz    Physical Exam  Constitutional:       General: Pt is not in acute distress.  HENT:      Head: Normocephalic and atraumatic.      Nose: Nose normal.   Eyes:      Conjunctiva/sclera: Conjunctivae normal.   Pulmonary:      Effort: Pulmonary effort is normal.   Abdominal:      General: Abdomen is flat.   Skin:     Findings: No rash.   Neurological:      General: No focal deficit present.      Mental Status: Pt is alert.   Psychiatric:         Mood and Affect: Mood normal.       Medical Decision Making       45 MINUTES SPENT BY ME on the date of service doing chart review, history, exam, documentation & further activities per the note.  "     Data     I have personally reviewed the following data over the past 24 hrs:    21.1 (H)  \   13.1 (L)   / 168     140 106 15.5 /  117 (H)   3.7 22 1.14 \     ALT: N/A AST: N/A AP: N/A TBILI: N/A   ALB: N/A TOT PROTEIN: N/A LIPASE: N/A     Trop: 14 BNP: N/A     TSH: N/A T4: N/A A1C: N/A     Procal: N/A CRP: N/A Lactic Acid: 1.2         Imaging results reviewed over the past 24 hrs:   Recent Results (from the past 24 hours)   Head CT w/o contrast    Narrative    EXAM: CT HEAD W/O CONTRAST, CT CERVICAL SPINE W/O CONTRAST  LOCATION: [facility]  DATE: 3/24/2025    INDICATION: Head and neck injury  COMPARISON: None.  TECHNIQUE:   1) Routine CT Head without IV contrast. Multiplanar reformats. Dose reduction techniques were used.  2) Routine CT Cervical Spine without IV contrast. Multiplanar reformats. Dose reduction techniques were used.    FINDINGS:   HEAD CT:   INTRACRANIAL CONTENTS: No intracranial hemorrhage, extraaxial collection, or mass effect.  No CT evidence of acute infarct. Moderate presumed chronic small vessel ischemic changes. Mild generalized volume loss. No hydrocephalus.     VISUALIZED ORBITS/SINUSES/MASTOIDS: No intraorbital abnormality. No paranasal sinus mucosal disease. No middle ear or mastoid effusion.    BONES/SOFT TISSUES: No acute abnormality.    CERVICAL SPINE CT:   VERTEBRA: Normal vertebral body heights and alignment. No fracture or posttraumatic subluxation.     CANAL/FORAMINA: No canal or neural foraminal stenosis.    PARASPINAL: No extraspinal abnormality. Visualized lung fields are clear.      Impression    IMPRESSION:  HEAD CT:  1.  No acute intracranial process.    CERVICAL SPINE CT:  1.  No CT evidence for acute fracture or post traumatic subluxation.   CT Cervical Spine w/o Contrast    Narrative    EXAM: CT HEAD W/O CONTRAST, CT CERVICAL SPINE W/O CONTRAST  LOCATION: [facility]  DATE: 3/24/2025    INDICATION: Head and neck  injury  COMPARISON: None.  TECHNIQUE:   1) Routine CT Head without IV contrast. Multiplanar reformats. Dose reduction techniques were used.  2) Routine CT Cervical Spine without IV contrast. Multiplanar reformats. Dose reduction techniques were used.    FINDINGS:   HEAD CT:   INTRACRANIAL CONTENTS: No intracranial hemorrhage, extraaxial collection, or mass effect.  No CT evidence of acute infarct. Moderate presumed chronic small vessel ischemic changes. Mild generalized volume loss. No hydrocephalus.     VISUALIZED ORBITS/SINUSES/MASTOIDS: No intraorbital abnormality. No paranasal sinus mucosal disease. No middle ear or mastoid effusion.    BONES/SOFT TISSUES: No acute abnormality.    CERVICAL SPINE CT:   VERTEBRA: Normal vertebral body heights and alignment. No fracture or posttraumatic subluxation.     CANAL/FORAMINA: No canal or neural foraminal stenosis.    PARASPINAL: No extraspinal abnormality. Visualized lung fields are clear.      Impression    IMPRESSION:  HEAD CT:  1.  No acute intracranial process.    CERVICAL SPINE CT:  1.  No CT evidence for acute fracture or post traumatic subluxation.   CTA Head Neck with Contrast    Narrative    EXAM: CTA HEAD NECK W CONTRAST  LOCATION: [facility]  DATE: 3/24/2025    INDICATION: right sided neck pain in setting of fall x two today. reports hx of total ICA occlusion but unknown side  COMPARISON: None.  CONTRAST: 67 ml Isovue 370  TECHNIQUE: Head and neck CT angiogram with IV contrast. Axial helical CT images of the head and neck vessels obtained during the arterial phase of intravenous contrast administration. Axial 2D reconstructed images and multiplanar 3D MIP reconstructed   images of the head and neck vessels were performed by the technologist. Dose reduction techniques were used. All stenosis measurements made according to NASCET criteria unless otherwise specified.    FINDINGS:   HEAD CTA:  ANTERIOR CIRCULATION: Mild atherosclerotic  changes cavernous and supraclinoid ICAs bilaterally. Hypoplastic right A1 with right A2 supplied in part from left anterior circulation.    POSTERIOR CIRCULATION: Short segment moderate narrowing proximal left P2. Dominant left and smaller right vertebral artery contribute to a normal basilar artery.     DURAL VENOUS SINUSES: Expected enhancement of the major dural venous sinuses.    NECK CTA:  RIGHT CAROTID: No measurable stenosis or dissection.    LEFT CAROTID: No measurable stenosis or dissection.    VERTEBRAL ARTERIES: Normal dominant left vertebral artery. Normal basilar artery. Only very faint and segmental visualization of nondominant right vertebral artery; this is suggestive of marked narrowing to occlusion, of uncertain acuity. Dominant left   vertebral artery.    AORTIC ARCH: Classic aortic arch anatomy with no significant stenosis at the origin of the great vessels.    NONVASCULAR STRUCTURES: 9 mm nodule left thyroid gland.      Impression    IMPRESSION:     HEAD CTA:   1.  Mild atherosclerotic changes cavernous and supraclinoid ICAs bilaterally.  2.  Short segment moderate narrowing proximal left P2.  3.  Intracranial circulation appears otherwise normal.    NECK CTA:  1.  No hemodynamically significant narrowing in either ICA.  2.  Normal dominant left vertebral artery.   3.  Normal basilar artery.   4.  Only very faint and segmental visualization of nondominant right vertebral artery; this is suggestive of marked narrowing to occlusion, of uncertain acuity.

## 2025-03-25 NOTE — PROGRESS NOTES
03/25/25 0841   Appointment Info   Signing Clinician's Name / Credentials (PT) Priya Estrella, PT   Living Environment   People in Home spouse   Current Living Arrangements house   Living Environment Comments wife works, pt retired, they  have a puppy at home   Self-Care   Usual Activity Tolerance moderate   Current Activity Tolerance moderate   Equipment Currently Used at Home none   Fall history within last six months yes   Activity/Exercise/Self-Care Comment indep at baseline   General Information   Onset of Illness/Injury or Date of Surgery 03/24/25   Referring Physician Chinmay Tineo PA-C   Patient/Family Therapy Goals Statement (PT) to return home   Pertinent History of Current Problem (include personal factors and/or comorbidities that impact the POC) confusion, falls, weakness, UTI, reports L foot has been causing falls   Existing Precautions/Restrictions fall   Cognition   Affect/Mental Status (Cognition) WFL   Cognitive Status Comments word finding issues noted, pt states this is new   Range of Motion (ROM)   Range of Motion ROM is WFL   Strength (Manual Muscle Testing)   Strength (Manual Muscle Testing) Deficits observed during functional mobility   Strength Comments 5/5 B dorsiflexion, UEs needed sit to stand   Bed Mobility   Comment, (Bed Mobility) mod I supine to sit   Transfers   Comment, (Transfers) CGA sit to stand   Gait/Stairs (Locomotion)   [place] Level (Gait) contact guard   Distance in Feet (Gait) 3   Pattern (Gait) step-through   Balance   Balance Comments static stance SBA   Clinical Impression   Criteria for Skilled Therapeutic Intervention Yes, treatment indicated   PT Diagnosis (PT) impaired functional mobility   Influenced by the following impairments generalized weakness   Functional limitations due to impairments dec act tolerance, gait   Clinical Presentation (PT Evaluation Complexity) stable   Clinical Presentation Rationale clinical judgement   Clinical Decision Making  (Complexity) low complexity   Planned Therapy Interventions (PT) gait training;patient/family education;stair training;strengthening;progressive activity/exercise;risk factor education;home program guidelines   Risk & Benefits of therapy have been explained evaluation/treatment results reviewed;care plan/treatment goals reviewed;risks/benefits reviewed;current/potential barriers reviewed;participants voiced agreement with care plan;patient   PT Total Evaluation Time   PT Eval, Low Complexity Minutes (23277) 10   Physical Therapy Goals   PT Frequency 3x/week   PT Predicted Duration/Target Date for Goal Attainment 04/01/25   PT Goals Gait;Stairs   PT: Gait Independent;Supervision/stand-by assist;Greater than 200 feet   PT: Stairs Modified independent;Supervision/stand-by assist;Greater than 10 stairs   Interventions   Interventions Quick Adds Therapeutic Activity   Therapeutic Activity   Therapeutic Activities: dynamic activities to improve functional performance Minutes (20654) 8   Symptoms Noted During/After Treatment Dizziness   Treatment Detail/Skilled Intervention reports dizziness supine to sit, states it  is more light headed dizziness, ambulation 17 ft no AD in room CGA to SBA, follows cues for turning, WBOS noted at times but no LOB, edu about walking again today with staff and not getting up without staff present, alarm on in chair, all needs in reach   PT Discharge Planning   PT Plan Tues 1/3- gait no AD and stairs (5 and 17)   PT Discharge Recommendation (DC Rec) home   PT Rationale for DC Rec moving with SBA/supervision, anticipate will regain baseline mobility during hospital stay to return home with wife, has had falls that pt states his L ankle may be contributing to (tendon lengthening)- could investigate that further OP ortho vs PT   PT Brief overview of current status ambulation in room without AD SBA, safe transfers   PT Total Distance Amb During Session (feet) 20   Physical Therapy Time and  Intention   Timed Code Treatment Minutes 8   Total Session Time (sum of timed and untimed services) 18

## 2025-03-25 NOTE — PROGRESS NOTES
"   03/25/25 0900   Appointment Info   Signing Clinician's Name / Credentials (OT) Sharona Chahalon, OTR/L   Living Environment   People in Home spouse   Current Living Arrangements house   Self-Care   Usual Activity Tolerance moderate   Current Activity Tolerance moderate   Equipment Currently Used at Home none   Fall history within last six months yes   Number of times patient has fallen within last six months 7   Activity/Exercise/Self-Care Comment at baseline: independent with ADLs/IADLs and related mobility. States he gets out into the community.   General Information   Onset of Illness/Injury or Date of Surgery 03/24/25   Referring Physician Chinmay Tineo PA-C   Patient/Family Therapy Goal Statement (OT) to return home. feels improved and safe to return home.   Additional Occupational Profile Info/Pertinent History of Current Problem Arley Lopez is a 74 year old male with PMHx of lightheadedness, memory loss, expressive aphagia, and urge incontinence who presents on 3/24/2025 with lightheadedness, falls, and urinary incontinence. On evaluation found to have infectious encephalopathy likely 2/2 urinary tract infection. Additionally with profound weakness with chronic lightheadedness, will need PT/OT evaluation prior to discharging.   Existing Precautions/Restrictions fall   Cognitive Status Examination   Orientation Status orientation to person, place and time   Cognitive Status Comments answering all questions appropriately. States at times he has difficulty \"finding my words\". States this happens at baseline. per discussion with RN appears this is baseline for patient.   Strength Comprehensive (MMT)   Comment, General Manual Muscle Testing (MMT) Assessment feels stronger than upon admit. B UE strength: WNL   Bed Mobility   Comment (Bed Mobility) SBA   Transfers   Transfer Comments SBA   Balance   Balance Comments Ambulates throughout room with SBA   Activities of Daily Living   BADL Assessment/Intervention " upper body dressing;toileting;lower body dressing   Upper Body Dressing Assessment/Training   [place] Level (Upper Body Dressing) independent   Lower Body Dressing Assessment/Training   Comment, (Lower Body Dressing) no impmairment anticipated based on clinical observation.   Toileting   [place] Level (Toileting) supervision   Clinical Impression   Criteria for Skilled Therapeutic Interventions Met (OT) Evaluation only   OT Diagnosis assess ADLs d/t weakness/fall hx   Clinical Decision Making Complexity (OT) problem focused assessment/low complexity   Risk & Benefits of therapy have been explained patient;evaluation/treatment results reviewed   Clinical Impression Comments No needs identified. Pt appears close to if not at baseline   OT Total Evaluation Time   OT Eval, Low Complexity Minutes (55205) 10   Total Session Time   Total Session Time (sum of timed and untimed services) 10

## 2025-03-25 NOTE — PROGRESS NOTES
Goal: Still confused. Pt often rambling and not making total sense. Has set off bed alarm and chair alarms many times during shift. Incontinent of urine multiple times with needing gown/bedding changed. Will continue to monitor.

## 2025-03-25 NOTE — PLAN OF CARE
"Patient is alert and oriented x4. He claims that he feels much better than before coming in, stating that he feels much stronger and clear headed. Patient has been ambulating to the bathroom with standby assist without any troubles. Patient calls appropriately. Patient denies pain for the shift. Patients orthostatic blood pressures were negative for a change today.    BP (!) 148/66 (BP Location: Left arm)   Pulse 95   Temp 98  F (36.7  C) (Oral)   Resp 18   Ht 1.702 m (5' 7\")   Wt 76.9 kg (169 lb 8.5 oz)   SpO2 98%   BMI 26.55 kg/m     Problem: Adult Inpatient Plan of Care  Goal: Plan of Care Review  Description: The Plan of Care Review/Shift note should be completed every shift.  The Outcome Evaluation is a brief statement about your assessment that the patient is improving, declining, or no change.  This information will be displayed automatically on your shiftnote.  Outcome: Progressing  Goal: Patient-Specific Goal (Individualized)  Description: You can add care plan individualizations to a care plan. Examples of Individualization might be:  \"Parent requests to be called daily at 9am for status\", \"I have a hard time hearing out of my right ear\", or \"Do not touch me to wake me up as it startlesme\".  Outcome: Progressing  Goal: Absence of Hospital-Acquired Illness or Injury  Outcome: Progressing  Intervention: Identify and Manage Fall Risk  Recent Flowsheet Documentation  Taken 3/25/2025 0934 by Zac Sosa, RN  Safety Promotion/Fall Prevention:   activity supervised   assistive device/personal items within reach   lighting adjusted   nonskid shoes/slippers when out of bed  Intervention: Prevent Skin Injury  Recent Flowsheet Documentation  Taken 3/25/2025 0934 by Zac Sosa, RN  Body Position: position changed independently  Goal: Optimal Comfort and Wellbeing  Outcome: Progressing  Goal: Readiness for Transition of Care  Outcome: Progressing     Problem: Fall Injury Risk  Goal: Absence of Fall " and Fall-Related Injury  Outcome: Progressing  Intervention: Promote Injury-Free Environment  Recent Flowsheet Documentation  Taken 3/25/2025 0900 by Zac Sosa, RN  Safety Promotion/Fall Prevention:   activity supervised   assistive device/personal items within reach   lighting adjusted   nonskid shoes/slippers when out of bed     Problem: UTI (Urinary Tract Infection)  Goal: Improved Infection Symptoms  Outcome: Progressing   Goal Outcome Evaluation:

## 2025-03-25 NOTE — PLAN OF CARE
[place] [facility] [place]    Patient admitted to room 2315 at approximately 2120 via cart from emergency room. Patient was accompanied by transport tech.     Verbal SBAR report received from Albert CLEMONS prior to patient arrival.     Patient trasferred to bed via slide board. Patient alert and oriented to person and date. Forgot name of hospital. The patient is not having any pain.  . Admission vital signs: Blood pressure (!) 150/74, pulse 120, temperature 98.3  F (36.8  C), temperature source Oral, resp. rate 20, height 1.702 m (5' 7\"), weight 76.9 kg (169 lb 8.5 oz), SpO2 97%. Patient was oriented to plan of care, call light, bed controls, tv, and bathroom.     Risk Assessment    The following safety risks were identified during admission: fall. Yellow risk band applied: YES.     Skin Initial Assessment    This writer admitted this patient and completed a full skin assessment and Mj score in the Adult PCS flowsheet.   Photo documentation of skin problem and/or wound competed via [employer] application (located under Media):  N/A    Appropriate interventions initiated as needed.     Secondary skin check completed by Rayo CLEMONS.    Mj Risk Assessment  Sensory Perception: 4-->no impairment  Moisture: 3-->occasionally moist  Activity: 3-->walks occasionally  Mobility: 3-->slightly limited  Nutrition: 3-->adequate  Friction and Shear: 3-->no apparent problem  Mj Score: 19    Education    Patient has a [place] to Observation order: No  Observation education completed and documented: N/A      Margot Juan RN                            " R hip replacement/Prosthetic device(s)

## 2025-03-25 NOTE — H&P
[facility]    History and Physical  Hospital Medicine       Date of Admission:  3/24/2025  Date of Service: 3/24/2025     Assessment & Plan   Arley Lopez is a 74 year old male with PMHx of lightheadedness, memory loss, expressive aphagia, and urge incontinence who presents on 3/24/2025 with lightheadedness, falls, and urinary incontinence. On evaluation found to have infectious encephalopathy likely 2/2 urinary tract infection. Additionally with profound weakness with chronic lightheadedness, will need PT/OT evaluation prior to discharging.    Encephalopathy, likely infectious  Chronic memory loss?    Patient initially concerned about memory loss about 18 months ago, unclear what his baseline is. On admission he is does not respond to questions appropriately and having issues with following simple tasks. CT head and CTA head unremarkable. 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 No finding on CMP or CBC to explain encephalopathy.  - Monitor for improvement with treatment of UTI  - Consider MRI to evaluate posterior circulation ischemia if not improving  - UDS add-on    Systemic inflammatory response syndrome (SIRS)  Urinary tract infection (UTI)    Endorses urinary hesitancy with incontinence on admission, has been previously worked up with unremarkable urogram for incontinence. Admit UA showing pyuria (), large LE, and small blood. Afebrile with leukocytosis (WBC 26.2). Mildly tachycardic without hypotension or elevated lactate to support sepsis. Bolused with 1 liter LR PTA. Blood cultures collected and ceftriaxone initiated. Prior Ucx 5/2024 without growth.  - Ceftriaxone 2 g q24hrs  - LR 1 liter bolus to complete 30 cc/kg fluid resuscitation followed by 125 mL/hr continuously  - Ucx collected 3/24 with NGTD  - Bcx x 2 collected 3/24 with NGTD    Chronic lightheadedness  Generalized weakness  Falls    Reports fall x 2 times which occurred 3/24,  "however does have great recollection of this. Denies head injury or LOC. Does mention having some neck pain, CT c-spine unremarkable. He is not on PTA anticoagulation. Falls seem related to chronic lightheadedness and weakness. Evaluated by ENT 8/2024 for chronic dizziness/lightheadedness with unremarkable workup.  - Check orthostatic vitals  - PT/OT evaluation     Essential hypertension    Chronic. Blood pressure 164/74. Manages PTA with amlodipine 5 mg and lisinopril. Lisinopril recently placed on hold 3/20 for lightheadedness.  - Continue amlodipine    Chronic kidney disease, stage 3a    Stable. Admit creatine 1.45 and GFR 51. Baseline creatine near 1.25.     Urge incontinence    Evaluated by urology 6/5/24 with unremarkable workup with CT urogram 7/2/24. Patient reports trial of medication but did not tolerate so was discontinued.    Gastroesophageal reflux disease (GERD)    Chronic. Managed PTA with pantoprazole 20 mg BID  - Continue pantoprazole BID    Clinically Significant Risk Factors Present on Admission        # Hypomagnesemia: Lowest Mg = 1.4 mg/dL in last 2 days, will replace as needed     # Drug Induced Platelet Defect: home medication list includes an antiplatelet medication   # Hypertension: Noted on problem list   # Overweight: Estimated body mass index is 26.55 kg/m  as calculated from the following:    Height as of this encounter: 1.702 m (5' 7\").    Weight as of this encounter: 76.9 kg (169 lb 8.5 oz).           Diet: Regular Diet Adult  DVT Prophylaxis: Low Risk/Ambulatory with no VTE prophylaxis indicated  Romero Catheter: Not present  Code Status: Full Code  Lines: PIV    Disposition Plan   Medically Ready for Discharge: Anticipated in 2-4 Days    I have discussed patient and formulated plan with attending hospitalist physician, JACOB Duckworth ROBINSON, PA-C        Primary Care Physician   Semmler, Steven Duane [phone]    History is obtained from the patient (unreliable), " emergency department physician, and review of old records via the EMR.    History of Present Illness   Arley Lopez is a 74 year old male with past medical history of hypertension, CKD3a, lightheadedness, memory loss, urinary incontinence, and GERD now presents on 3/24/2025 with lightheadedness, weakness, and falls.    Arley states that he has had lightheadedness for over 1 year in which there has been no explanation for. He most recently was evaluated by ENT 8/2024. He states having a occluded right vertebral artery, however was told that fixing this would not changes his symptoms. He seems to be confused on presentation. He states having memory loss for over a year. His wife states he has word finding difficulty and not reported to be worsened. He states falling x 2 times today due to weakness and dizziness, however cannot recall the details of these falls other than he fell to his knees while getting out of a car and needed the assistance from a neighbor to get up. He denies head injury, LOC, or injury from fall. He is not on anticoagulation. His wife was present during the falls. Denies headache or vision changes. He states feeling weaker than usual. He thinks a combination of lightheadedness and weakness caused his falls today. He is unable to state whether anything triggers this lightheadedness, however chart review suggest that is related to position changes. There was recent discontinuation of lisinopril. He believes he is on too much medication but cannot provide any reasoning behind this or how they would cause his symptoms. He denies fever but endorses chills. Denies cough or shortness of breath. Denies chest pain or palpitations. Denies abdominal pain, nausea, vomiting, or diarrhea. Endorses urinary incontinence.     Review of Systems   Review of Systems   Constitutional:  Positive for chills. Negative for fever.   Eyes:  Negative for blurred vision and double vision.   Respiratory:  Negative for cough  and shortness of breath.    Cardiovascular:  Negative for chest pain, palpitations and leg swelling.   Gastrointestinal:  Negative for abdominal pain, diarrhea, nausea and vomiting.   Genitourinary:  Negative for dysuria and urgency.        Hesitancy   Musculoskeletal:  Positive for falls and neck pain. Negative for back pain and joint pain.   Neurological:  Positive for weakness. Negative for tingling, focal weakness, loss of consciousness and headaches.   Psychiatric/Behavioral:  Positive for memory loss. Negative for substance abuse.      Past Medical History    Past Medical History:   Diagnosis Date    Acute calculous cholecystitis 11/23/2019    Acute cholecystitis 11/23/2019    Essential hypertension     GERD (gastroesophageal reflux disease)     Kidney problem      Past Surgical History   Past Surgical History:   Procedure Laterality Date    COLONOSCOPY  May 2012    abnormal !    ENDOSCOPY  01/10/2011    Reflux duodenum, Hiatal hernia small, otherwise normal exam.    FOOT SURGERY Left     GENITOURINARY SURGERY      vasectomy    GENITOURINARY SURGERY      spermacele    LAPAROSCOPIC CHOLECYSTECTOMY N/A 11/24/2019    Procedure: CHOLECYSTECTOMY, LAPAROSCOPIC;  Surgeon: Yehuda Puga MD;  Location: [facility] OR     Prior to Admission Medications   Prior to Admission Medications   Prescriptions Last Dose Informant Patient Reported? Taking?   ASPIRIN 81 PO 3/23/2025 Evening Self Yes Yes   Sig: Take 1 tablet by mouth every evening.   amLODIPine (NORVASC) 5 MG tablet 3/24/2025 Morning Self No Yes   Sig: Take 1 tablet by mouth once daily for blood pressure   atorvastatin (LIPITOR) 20 MG tablet  Self Yes Yes   Sig: Take 1 tablet by mouth daily.   metoprolol succinate ER (TOPROL-XL) 50 MG 24 hr tablet 3/24/2025 Morning Self No Yes   Sig: Take 1 tablet by mouth once daily for blood pressure   nortriptyline (PAMELOR) 10 MG capsule 3/23/2025 Bedtime Self No Yes   Sig: Take 1 capsule by mouth at bedtime   omeprazole (PRILOSEC) 20  MG DR capsule 3/24/2025 Morning Self No Yes   Sig: Take 1 capsule (20 mg) by mouth 2 times daily For stomach.      Facility-Administered Medications: None     Allergies   Allergies   Allergen Reactions    Penicillin G Hives and Swelling     Family History    Family History   Problem Relation Age of Onset    Cancer Mother     Rheumatic fever Mother     Suicide Father      Social History   Social History     Socioeconomic History    Marital status:      Spouse name: Not on file    Number of children: 2    Years of education: Not on file    Highest education level: Not on file   Occupational History    Occupation: Retired     Comment:    Tobacco Use    Smoking status: Former     Current packs/day: 0.00     Average packs/day: 2.0 packs/day for 2.0 years (4.0 ttl pk-yrs)     Types: Cigarettes     Start date: 1990     Quit date: 1992     Years since quittin.2    Smokeless tobacco: Never   Substance and Sexual Activity    Alcohol use: Yes     Comment: beer per monthly    Drug use: No    Sexual activity: Yes     Partners: Female   Other Topics Concern    Parent/sibling w/ CABG, MI or angioplasty before 65F 55M? No     Service Not Asked    Blood Transfusions Not Asked    Caffeine Concern Not Asked    Occupational Exposure Yes     Comment:     Hobby Hazards Not Asked    Sleep Concern Not Asked    Stress Concern Not Asked    Weight Concern Not Asked    Special Diet Not Asked    Back Care Not Asked    Exercise Not Asked    Bike Helmet Not Asked    Seat Belt Not Asked    Self-Exams Not Asked   Social History Narrative    Not on file     Social Drivers of Health     Financial Resource Strain: Low Risk  (3/24/2025)    Financial Resource Strain     Within the past 12 months, have you or your family members you live with been unable to get utilities (heat, electricity) when it was really needed?: No   Food Insecurity: Low Risk  (3/24/2025)    Food Insecurity     Within the past 12  "months, did you worry that your food would run out before you got money to buy more?: No     Within the past 12 months, did the food you bought just not last and you didn t have money to get more?: No   Transportation Needs: Low Risk  (3/24/2025)    Transportation Needs     Within the past 12 months, has lack of transportation kept you from medical appointments, getting your medicines, non-medical meetings or appointments, work, or from getting things that you need?: No   Physical Activity: Not on file   Stress: Not on file   Social Connections: Socially Integrated (10/7/2024)    [facility]    Social Connections     Do you often feel lonely or isolated from those around you?: 0   Interpersonal Safety: Low Risk  (3/24/2025)    Interpersonal Safety     Do you feel physically and emotionally safe where you currently live?: Yes     Within the past 12 months, have you been hit, slapped, kicked or otherwise physically hurt by someone?: No     Within the past 12 months, have you been humiliated or emotionally abused in other ways by your partner or ex-partner?: No   Housing Stability: Low Risk  (3/24/2025)    Housing Stability     Do you have housing? : Yes     Are you worried about losing your housing?: No     Physical Exam   BP 97/52 (BP Location: Left arm, Patient Position: Standing, Cuff Size: Adult Regular)   Pulse (!) 123   Temp 100.2  F (37.9  C) (Oral)   Resp 18   Ht 1.702 m (5' 7\")   Wt 76.9 kg (169 lb 8.5 oz)   SpO2 97%   BMI 26.55 kg/m       Weight: 169 lbs 8.54 oz Body mass index is 26.55 kg/m .     Constitutional: Middle aged male, resting comfortably in bed, WDWN, alert, oriented to person/place, unable to following simple commands, does not answer questions appropriately, cooperative, no apparent distress, appears nontoxic.  Eyes: Eyes are clear, pupils are reactive.  HENT: Oropharynx is clear and moist. No evidence of cranial trauma.  Cardiovascular: " Regular rate and rhythm, normal S1 and S2, and no murmur noted. JVP is normal. Good peripheral pulses in wrists bilaterally. No lower extremity edema.  Respiratory: Clear to auscultation bilaterally. Normal respiratory effort on RA. Speaking in full sentences.  GI: Soft, non-distended, non-tender, normal bowel sounds, no hepatomegaly.  Genitourinary: Deferred  Musculoskeletal: Normal muscle bulk and tone. Moving extremities appropriately.  Skin: Warm and dry, no rashes.   Neurologic: Neck supple. Cranial nerves are grossly intact.  is symmetric. Upper extremity strength 5/5 to elbow flexion/extension and shoulder adduction bilaterally. Lower extremity strength intact 5/5 to knee flexion/extension and hip flexion bilaterally. Biceps reflex 3+ bilaterally. Patellar reflex 3+ bilaterally. Unable to assess gait or Romberg due to inability to stand independently. Normal finger to nose test. Normal finger tapping test.     Data   Data reviewed today:     I have personally reviewed the following data over the past 24 hrs:    26.2 (H)  \   13.0 (L)   / 208     140 101 20.9 /  117 (H)   3.9 24 1.45 (H) \     ALT: 23 AST: 22 AP: 141 TBILI: 0.9   ALB: 4.4 TOT PROTEIN: 7.7 LIPASE: N/A     Trop: 14 BNP: N/A     TSH: 1.58 T4: N/A A1C: N/A     Procal: N/A CRP: N/A Lactic Acid: 1.2         Recent Results (from the past 24 hours)   Head CT w/o contrast    Narrative    EXAM: CT HEAD W/O CONTRAST, CT CERVICAL SPINE W/O CONTRAST  LOCATION: [facility]  DATE: 3/24/2025    INDICATION: Head and neck injury  COMPARISON: None.  TECHNIQUE:   1) Routine CT Head without IV contrast. Multiplanar reformats. Dose reduction techniques were used.  2) Routine CT Cervical Spine without IV contrast. Multiplanar reformats. Dose reduction techniques were used.    FINDINGS:   HEAD CT:   INTRACRANIAL CONTENTS: No intracranial hemorrhage, extraaxial collection, or mass effect.  No CT evidence of acute infarct. Moderate  presumed chronic small vessel ischemic changes. Mild generalized volume loss. No hydrocephalus.     VISUALIZED ORBITS/SINUSES/MASTOIDS: No intraorbital abnormality. No paranasal sinus mucosal disease. No middle ear or mastoid effusion.    BONES/SOFT TISSUES: No acute abnormality.    CERVICAL SPINE CT:   VERTEBRA: Normal vertebral body heights and alignment. No fracture or posttraumatic subluxation.     CANAL/FORAMINA: No canal or neural foraminal stenosis.    PARASPINAL: No extraspinal abnormality. Visualized lung fields are clear.      Impression    IMPRESSION:  HEAD CT:  1.  No acute intracranial process.    CERVICAL SPINE CT:  1.  No CT evidence for acute fracture or post traumatic subluxation.   CT Cervical Spine w/o Contrast    Narrative    EXAM: CT HEAD W/O CONTRAST, CT CERVICAL SPINE W/O CONTRAST  LOCATION: [facility]  DATE: 3/24/2025    INDICATION: Head and neck injury  COMPARISON: None.  TECHNIQUE:   1) Routine CT Head without IV contrast. Multiplanar reformats. Dose reduction techniques were used.  2) Routine CT Cervical Spine without IV contrast. Multiplanar reformats. Dose reduction techniques were used.    FINDINGS:   HEAD CT:   INTRACRANIAL CONTENTS: No intracranial hemorrhage, extraaxial collection, or mass effect.  No CT evidence of acute infarct. Moderate presumed chronic small vessel ischemic changes. Mild generalized volume loss. No hydrocephalus.     VISUALIZED ORBITS/SINUSES/MASTOIDS: No intraorbital abnormality. No paranasal sinus mucosal disease. No middle ear or mastoid effusion.    BONES/SOFT TISSUES: No acute abnormality.    CERVICAL SPINE CT:   VERTEBRA: Normal vertebral body heights and alignment. No fracture or posttraumatic subluxation.     CANAL/FORAMINA: No canal or neural foraminal stenosis.    PARASPINAL: No extraspinal abnormality. Visualized lung fields are clear.      Impression    IMPRESSION:  HEAD CT:  1.  No acute intracranial process.    CERVICAL  SPINE CT:  1.  No CT evidence for acute fracture or post traumatic subluxation.   CTA Head Neck with Contrast    Narrative    EXAM: CTA HEAD NECK W CONTRAST  LOCATION: [facility]  DATE: 3/24/2025    INDICATION: right sided neck pain in setting of fall x two today. reports hx of total ICA occlusion but unknown side  COMPARISON: None.  CONTRAST: 67 ml Isovue 370  TECHNIQUE: Head and neck CT angiogram with IV contrast. Axial helical CT images of the head and neck vessels obtained during the arterial phase of intravenous contrast administration. Axial 2D reconstructed images and multiplanar 3D MIP reconstructed   images of the head and neck vessels were performed by the technologist. Dose reduction techniques were used. All stenosis measurements made according to NASCET criteria unless otherwise specified.    FINDINGS:   HEAD CTA:  ANTERIOR CIRCULATION: Mild atherosclerotic changes cavernous and supraclinoid ICAs bilaterally. Hypoplastic right A1 with right A2 supplied in part from left anterior circulation.    POSTERIOR CIRCULATION: Short segment moderate narrowing proximal left P2. Dominant left and smaller right vertebral artery contribute to a normal basilar artery.     DURAL VENOUS SINUSES: Expected enhancement of the major dural venous sinuses.    NECK CTA:  RIGHT CAROTID: No measurable stenosis or dissection.    LEFT CAROTID: No measurable stenosis or dissection.    VERTEBRAL ARTERIES: Normal dominant left vertebral artery. Normal basilar artery. Only very faint and segmental visualization of nondominant right vertebral artery; this is suggestive of marked narrowing to occlusion, of uncertain acuity. Dominant left   vertebral artery.    AORTIC ARCH: Classic aortic arch anatomy with no significant stenosis at the origin of the great vessels.    NONVASCULAR STRUCTURES: 9 mm nodule left thyroid gland.      Impression    IMPRESSION:     HEAD CTA:   1.  Mild atherosclerotic changes  cavernous and supraclinoid ICAs bilaterally.  2.  Short segment moderate narrowing proximal left P2.  3.  Intracranial circulation appears otherwise normal.    NECK CTA:  1.  No hemodynamically significant narrowing in either ICA.  2.  Normal dominant left vertebral artery.   3.  Normal basilar artery.   4.  Only very faint and segmental visualization of nondominant right vertebral artery; this is suggestive of marked narrowing to occlusion, of uncertain acuity.

## 2025-03-25 NOTE — PLAN OF CARE
Skin affirmation note     Admitting nurse completed full skin assessment, Mj score and Mj interventions. This writer agrees with the initial skin assessment findings.

## 2025-03-25 NOTE — PLAN OF CARE
Goal Outcome Evaluation:      Plan of Care Reviewed With: patient    Overall Patient Progress: no changeOverall Patient Progress: no change     Alert and oriented, but confused at times. Answers questions with stories that do not correlate with question. Calm and cooperative. Bedrest overnight, but sat and stood at bedside for orthostatic blood pressures. Had two staff due to reports of falls and light headedness, but patient had no symptoms. Completed a LR bolus of 1,000cc then started LR at 125cc an hour. Primofit in place with clear yellow urine. After admission, patient was accidentally pushing call light every 5 minutes for over an hour, but was only trying to change the channel on the TV. Reoriented each time, but still kept pushing call light. Patient finally asked for TV to be turned off and then he was able to sleep. Low grade fever spike to 100.2 overnight. Down to 97.7 after prn tylenol.

## 2025-03-26 ENCOUNTER — APPOINTMENT (OUTPATIENT)
Dept: CT IMAGING | Facility: CLINIC | Age: 75
DRG: 871 | End: 2025-03-26
Attending: INTERNAL MEDICINE
Payer: COMMERCIAL

## 2025-03-26 LAB
CRP SERPL-MCNC: 202.14 MG/L
ERYTHROCYTE [DISTWIDTH] IN BLOOD BY AUTOMATED COUNT: 13.1 % (ref 10–15)
HCT VFR BLD AUTO: 37.6 % (ref 40–53)
HGB BLD-MCNC: 12.5 G/DL (ref 13.3–17.7)
HOLD SPECIMEN: NORMAL
MAGNESIUM SERPL-MCNC: 1.8 MG/DL (ref 1.7–2.3)
MCH RBC QN AUTO: 29.5 PG (ref 26.5–33)
MCHC RBC AUTO-ENTMCNC: 33.2 G/DL (ref 31.5–36.5)
MCV RBC AUTO: 89 FL (ref 78–100)
PLATELET # BLD AUTO: 174 10E3/UL (ref 150–450)
PSA SERPL DL<=0.01 NG/ML-MCNC: 61.98 NG/ML (ref 0–6.5)
RBC # BLD AUTO: 4.24 10E6/UL (ref 4.4–5.9)
WBC # BLD AUTO: 16.3 10E3/UL (ref 4–11)

## 2025-03-26 PROCEDURE — 74177 CT ABD & PELVIS W/CONTRAST: CPT

## 2025-03-26 PROCEDURE — 99233 SBSQ HOSP IP/OBS HIGH 50: CPT | Performed by: INTERNAL MEDICINE

## 2025-03-26 PROCEDURE — 36415 COLL VENOUS BLD VENIPUNCTURE: CPT | Performed by: STUDENT IN AN ORGANIZED HEALTH CARE EDUCATION/TRAINING PROGRAM

## 2025-03-26 PROCEDURE — 85018 HEMOGLOBIN: CPT | Performed by: INTERNAL MEDICINE

## 2025-03-26 PROCEDURE — 120N000001 HC R&B MED SURG/OB

## 2025-03-26 PROCEDURE — 71260 CT THORAX DX C+: CPT

## 2025-03-26 PROCEDURE — 250N000011 HC RX IP 250 OP 636

## 2025-03-26 PROCEDURE — 250N000013 HC RX MED GY IP 250 OP 250 PS 637: Performed by: INTERNAL MEDICINE

## 2025-03-26 PROCEDURE — 250N000013 HC RX MED GY IP 250 OP 250 PS 637: Performed by: STUDENT IN AN ORGANIZED HEALTH CARE EDUCATION/TRAINING PROGRAM

## 2025-03-26 PROCEDURE — 250N000013 HC RX MED GY IP 250 OP 250 PS 637

## 2025-03-26 PROCEDURE — 86140 C-REACTIVE PROTEIN: CPT | Performed by: INTERNAL MEDICINE

## 2025-03-26 PROCEDURE — 83735 ASSAY OF MAGNESIUM: CPT | Performed by: STUDENT IN AN ORGANIZED HEALTH CARE EDUCATION/TRAINING PROGRAM

## 2025-03-26 PROCEDURE — 250N000009 HC RX 250: Performed by: INTERNAL MEDICINE

## 2025-03-26 PROCEDURE — 250N000011 HC RX IP 250 OP 636: Performed by: INTERNAL MEDICINE

## 2025-03-26 PROCEDURE — G0103 PSA SCREENING: HCPCS | Performed by: INTERNAL MEDICINE

## 2025-03-26 RX ORDER — NYSTATIN 100000 [USP'U]/ML
1000000 SUSPENSION ORAL 4 TIMES DAILY
Status: DISCONTINUED | OUTPATIENT
Start: 2025-03-26 | End: 2025-03-27 | Stop reason: HOSPADM

## 2025-03-26 RX ORDER — IOPAMIDOL 755 MG/ML
83 INJECTION, SOLUTION INTRAVASCULAR ONCE
Status: COMPLETED | OUTPATIENT
Start: 2025-03-26 | End: 2025-03-26

## 2025-03-26 RX ADMIN — PANTOPRAZOLE SODIUM 20 MG: 20 TABLET, DELAYED RELEASE ORAL at 20:11

## 2025-03-26 RX ADMIN — PANTOPRAZOLE SODIUM 20 MG: 20 TABLET, DELAYED RELEASE ORAL at 07:47

## 2025-03-26 RX ADMIN — Medication 400 MG: at 07:47

## 2025-03-26 RX ADMIN — IOPAMIDOL 83 ML: 755 INJECTION, SOLUTION INTRAVENOUS at 18:32

## 2025-03-26 RX ADMIN — SODIUM CHLORIDE 61 ML: 9 INJECTION, SOLUTION INTRAVENOUS at 18:32

## 2025-03-26 RX ADMIN — Medication 400 MG: at 20:11

## 2025-03-26 RX ADMIN — ASPIRIN 81 MG: 81 TABLET ORAL at 21:12

## 2025-03-26 RX ADMIN — ACETAMINOPHEN 650 MG: 325 TABLET, FILM COATED ORAL at 16:48

## 2025-03-26 RX ADMIN — NYSTATIN 1000000 UNITS: 100000 SUSPENSION ORAL at 21:12

## 2025-03-26 RX ADMIN — AMLODIPINE BESYLATE 5 MG: 5 TABLET ORAL at 07:47

## 2025-03-26 RX ADMIN — CEFTRIAXONE 2 G: 2 INJECTION, POWDER, FOR SOLUTION INTRAMUSCULAR; INTRAVENOUS at 16:48

## 2025-03-26 RX ADMIN — CYANOCOBALAMIN TAB 500 MCG 1000 MCG: 500 TAB at 07:47

## 2025-03-26 RX ADMIN — METOPROLOL SUCCINATE 50 MG: 50 TABLET, EXTENDED RELEASE ORAL at 07:47

## 2025-03-26 RX ADMIN — NYSTATIN 1000000 UNITS: 100000 SUSPENSION ORAL at 18:44

## 2025-03-26 RX ADMIN — NORTRIPTYLINE HYDROCHLORIDE 10 MG: 10 CAPSULE ORAL at 21:12

## 2025-03-26 ASSESSMENT — ACTIVITIES OF DAILY LIVING (ADL)
ADLS_ACUITY_SCORE: 35
ADLS_ACUITY_SCORE: 36
ADLS_ACUITY_SCORE: 38
ADLS_ACUITY_SCORE: 36
ADLS_ACUITY_SCORE: 35
ADLS_ACUITY_SCORE: 36
ADLS_ACUITY_SCORE: 35
ADLS_ACUITY_SCORE: 35
ADLS_ACUITY_SCORE: 38
ADLS_ACUITY_SCORE: 36
ADLS_ACUITY_SCORE: 35
ADLS_ACUITY_SCORE: 35
ADLS_ACUITY_SCORE: 36
ADLS_ACUITY_SCORE: 35

## 2025-03-26 NOTE — PLAN OF CARE
"  Problem: Adult Inpatient Plan of Care  Goal: Plan of Care Review  Description: The Plan of Care Review/Shift note should be completed every shift.  The Outcome Evaluation is a brief statement about your assessment that the patient is improving, declining, or no change.  This information will be displayed automatically on your shiftnote.  Outcome: Progressing  Goal: Patient-Specific Goal (Individualized)  Description: You can add care plan individualizations to a care plan. Examples of Individualization might be:  \"Parent requests to be called daily at 9am for status\", \"I have a hard time hearing out of my right ear\", or \"Do not touch me to wake me up as it startlesme\".  Outcome: Progressing  Goal: Absence of Hospital-Acquired Illness or Injury  Outcome: Progressing  Goal: Optimal Comfort and Wellbeing  Outcome: Progressing  Goal: Readiness for Transition of Care  Outcome: Progressing     Problem: Delirium  Goal: Optimal Coping  Outcome: Progressing  Goal: Improved Behavioral Control  Outcome: Progressing  Goal: Improved Attention and Thought Clarity  Outcome: Progressing  Goal: Improved Sleep  Outcome: Progressing     Problem: Fall Injury Risk  Goal: Absence of Fall and Fall-Related Injury  Outcome: Progressing     Problem: UTI (Urinary Tract Infection)  Goal: Improved Infection Symptoms  Outcome: Progressing   Goal Outcome Evaluation:       Patient is up SBA in room. Is A&Ox3 with intermittent confusion. Is voiding without difficulty. Family at bedside. Patient is waiting for MD to round for further questions.  BP (!) 148/67 (BP Location: Left arm)   Pulse 118   Temp 98.1  F (36.7  C) (Oral)   Resp 20   Ht 1.702 m (5' 7\")   Wt 76.9 kg (169 lb 8.5 oz)   SpO2 98%   BMI 26.55 kg/m    Monica Duggan RN BSN                   "

## 2025-03-26 NOTE — PLAN OF CARE
Goal Outcome Evaluation:      Plan of Care Reviewed With: patient    Overall Patient Progress: improvingOverall Patient Progress: improving     Alert and oriented to name, place, date, and situation. Uses rambling speech at times, talking off topic. Up to bathroom with assist of one. Denies dizziness or light headedness. Uses call light. Incontinent of small amounts urine. Tried to have BM on toilet, but did not. Patient states he is hoping to go home today.

## 2025-03-26 NOTE — PROGRESS NOTES
Goal: Progressing    Slow moving. Unsteady gait. Intermittently confused. VSS.Unsure as to why he is in the hospital. Will continue to monitor.

## 2025-03-26 NOTE — PROGRESS NOTES
"[facility] Medicine Progress Note  Date of Service: 03/26/2025     Assessment & Plan   Arley Lopez is a 74 year old male with PMHx of lightheadedness, memory loss, expressive aphagia, and urge incontinence who presents on 3/24/2025 with lightheadedness, falls, and urinary incontinence. On evaluation found to have infectious encephalopathy likely 2/2 urinary tract infection. Additionally with profound weakness with chronic lightheadedness, will need PT/OT evaluation prior to discharging.    Sepsis  Urinary tract infection (UTI) - ruled out  Possible prostatitis  Pharyngitis  Endorses urinary hesitancy with incontinence on admission, has been previously worked up with unremarkable urogram for incontinence. Admit UA showing pyuria (), large LE, and small blood. Afebrile with leukocytosis (WBC 26.2). Mildly tachycardic without hypotension or elevated lactate to support sepsis. Bolused with 1 liter LR PTA. Blood cultures collected and ceftriaxone initiated.   Prior Ucx 5/2024 without growth.  3/25: LR 1 liter bolus to complete 30 cc/kg fluid resuscitation followed by 125 mL/hr continuously    UCx <1000 mixed urogenital taylor. Does not appear to be UTI.     Symptoms of chills, fatigue, weight loss for past few months. Marked leucocytosis persists. CRP checked today and is 202.     Could be prostatitis given symptoms and UA. PSA checked and is elevated 61 (new from a year ago). However, prostate is not tender on limited digital exam 3/26 and ceftriaxone should help.     C/o sore throat \"for a while\" though this is first complaint per wife. Possible posterior pharynx thrush on exam.    - continue ceftriaxone   - check CT chest/abdomen/pelvis today   - empiric nystatin swish and swallow, consider further testing if does not resolve    Acute metabolic encephalopathy  Chronic memory loss  Suspect acute confusion related to current sepsis/infection   Patient initially concerned " about memory loss about 18 months ago, unclear what his baseline is. On admission he is does not respond to questions appropriately and having issues with following simple tasks. CT head and CTA head unremarkable. 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 No finding on CMP or CBC to explain encephalopathy.  UDS negative  TSH normal  B12 <400 (~310)  - Methylmalonic acid pending, if elevated confirms B12 deficiency   - Will empirically give b12 given low risk, 1 dose injection then PO     - If deficiency confirmed, recommend rechecking b12 +/- MMA in ~3 months after po supplementation, if still low patient may need to re-visit his PPI +/- consider pernicious workup (or give injections)  - Monitor for improvement with treatment of UTI    Chronic lightheadedness (suspect POTS, recheck outpatient after infection)  Generalized weakness  Falls    Reports fall x 2 times which occurred 3/24, however does have great recollection of this. Denies head injury or LOC. Does mention having some neck pain, CT c-spine unremarkable. He is not on PTA anticoagulation. Falls seem related to chronic lightheadedness and weakness. Evaluated by ENT 8/2024 for chronic dizziness/lightheadedness with unremarkable workup.  3/25: orthostats negative for BP change but did have nearly 20 point HR increase  - recommend outpatient orthostatic vitals to assess for POTS when infection resolves  - PT/OT evaluation     Essential hypertension    Chronic. Blood pressure 164/74. Manages PTA with amlodipine 5 mg and lisinopril. Lisinopril recently placed on hold 3/20 for lightheadedness.  - Continue amlodipine    Chronic kidney disease, stage 3a    Stable. Admit creatine 1.45 and GFR 51. Baseline creatine near 1.25.     Urge incontinence    Evaluated by urology 6/5/24 with unremarkable workup with CT urogram 7/2/24. Patient reports trial of medication but did not tolerate so was discontinued.    Gastroesophageal reflux disease  "(GERD)    Chronic. Managed PTA with pantoprazole 20 mg BID  - Continue pantoprazole BID          Diet: Regular Diet Adult    DVT Prophylaxis: Low Risk/Ambulatory with no VTE prophylaxis indicated  Romero Catheter: Not present  Lines: None     Cardiac Monitoring: None  Code Status: Full Code      Clinically Significant Risk Factors             # Hypomagnesemia: Lowest Mg = 1.6 mg/dL in last 2 days, will replace as needed       # Hypertension: Noted on problem list            # Overweight: Estimated body mass index is 26.55 kg/m  as calculated from the following:    Height as of this encounter: 1.702 m (5' 7\").    Weight as of this encounter: 76.9 kg (169 lb 8.5 oz)., PRESENT ON ADMISSION            Social Drivers of Health    Tobacco Use: Medium Risk (10/7/2024)    Received from [facility]    Patient History     Smoking Tobacco Use: Former     Smokeless Tobacco Use: Never       Discussion/Disposition: Initial improvement in symptoms may have been due to IV fluids for dehydration, otherwise clinically does not seem much better. Broadening work up as noted. Discussed with patient and wife. Likely at least a couple of more nights.     Medically Ready for Discharge: Anticipated in 2-4 Days         Medical Decision Making       55 MINUTES SPENT BY ME on the date of service doing chart review, history, exam, documentation & further activities per the note.        Hamilton Bhatt MD  [facility]  Securely message with [employer] (more info)  Text page via [facility] Paging/Directory       Interval History   Felt the better initially but still not quite well.  Reviewed HPI with patient and wife.  Patient has been having chills and progressive fatigue for the last few months.  His last full 10 pounds in the last month.  No night sweats.  The urinary symptoms he had on admission are little bit better but not quite resolved.  Review of systems is negative " "except he does admit to some sore throat.  He says this been going on actually for weeks though had not mentioned it to his wife or anyone else.  Feels like his throat is \"closing up\" and feels like this is part of his reflux or may be a reaction to his omeprazole that has been on a long time.  Having some trouble swallowing pills because of it.  No neck pain posteriorly.  He had a mild headache on the right side of his temple earlier today that is now resolved.  Low-grade temp of 100.4 this afternoon.  Had not been having fevers at home.  No diarrhea.    Physical Exam   Temp:  [97.8  F (36.6  C)-100.4  F (38  C)] 100.4  F (38  C)  Pulse:  [117-118] 117  Resp:  [16-20] 16  BP: (128-148)/(52-72) 141/72  SpO2:  [94 %-98 %] 97 %    Weights:   Vitals:    03/24/25 1452 03/24/25 2132   Weight: 77.9 kg (171 lb 12.8 oz) 76.9 kg (169 lb 8.5 oz)    Body mass index is 26.55 kg/m .    Constitutional: Alert and oriented x 3 but looks tired and appears flushed and mildly ill-appearing.  CV: Regular, no murmur  Respiratory: CTA bilaterally  GI: Soft, non-tender, bowel sounds normal  : On rectal exam, there is no perianal tenderness, the inferior aspect of the prostate could be palpated and was firm it did not seem tender but could not reach to do a full prostate exam.  Skin: Warm and dry  Musculoskeletal: No joint swelling    Data   Recent Labs   Lab 03/26/25  0604 03/25/25  0853 03/25/25  0559 03/24/25  1527   WBC 16.3* 21.1* 19.4* 26.2*   HGB 12.5* 13.1* 11.7* 13.0*   MCV 89 89 88 88    168 167 208   NA  --   --  140 140   POTASSIUM  --   --  3.7 3.9   CHLORIDE  --   --  106 101   CO2  --   --  22 24   BUN  --   --  15.5 20.9   CR  --   --  1.14 1.45*   ANIONGAP  --   --  12 15   YARELY  --   --  8.8 9.4   GLC  --   --  117* 117*   ALBUMIN  --   --   --  4.4   PROTTOTAL  --   --   --  7.7   BILITOTAL  --   --   --  0.9   ALKPHOS  --   --   --  141   ALT  --   --   --  23   AST  --   --   --  22       Recent Labs   Lab " 03/25/25  0559 03/24/25  1527   * 117*        Unresulted Labs Ordered in the Past 30 Days of this Admission       Date and Time Order Name Status Description    3/25/2025  8:32 AM Methylmalonic Acid In process     3/24/2025  4:50 PM Blood Culture Peripheral Blood Preliminary     3/24/2025  4:50 PM Blood Culture Peripheral Blood Preliminary              Imaging: No results found for this or any previous visit (from the past 24 hours).     I reviewed all new labs and imaging results over the last 24 hours. I personally reviewed no images or EKG's today.    Medications   Current Facility-Administered Medications   Medication Dose Route Frequency Provider Last Rate Last Admin     Current Facility-Administered Medications   Medication Dose Route Frequency Provider Last Rate Last Admin    amLODIPine (NORVASC) tablet 5 mg  5 mg Oral Daily Chinmay Tineo PA-C   5 mg at 03/26/25 0747    aspirin EC tablet 81 mg  81 mg Oral At Bedtime Chinmay Tineo PA-C   81 mg at 03/25/25 2207    cefTRIAXone (ROCEPHIN) 2 g vial to attach to  ml bag for ADULTS or NS 50 ml bag for PEDS  2 g Intravenous Q24H Chinmay Tineo PA-C   2 g at 03/26/25 1648    cyanocobalamin (VITAMIN B-12) tablet 1,000 mcg  1,000 mcg Oral Daily Carlos Alberto Barryi, DO   1,000 mcg at 03/26/25 0747    magnesium oxide (MAG-OX) tablet 400 mg  400 mg Oral BID Carlos Alberto Barryi, DO   400 mg at 03/26/25 0747    metoprolol succinate ER (TOPROL XL) 24 hr tablet 50 mg  50 mg Oral Daily Chinmay Tineo PA-C   50 mg at 03/26/25 0747    nortriptyline (PAMELOR) capsule 10 mg  10 mg Oral At Bedtime Chinmay Tineo PA-C   10 mg at 03/25/25 2207    nystatin (MYCOSTATIN) suspension 1,000,000 Units  1,000,000 Units Swish & Swallow 4x Daily HemmiHamilton danielson MD        pantoprazole (PROTONIX) EC tablet 20 mg  20 mg Oral BID Chinmay Tineo PA-C   20 mg at 03/26/25 0747    sodium chloride (PF) 0.9% PF flush 3 mL  3 mL Intracatheter Q8H Chinmay Cordon PA-C   3 mL at  03/26/25 1648       Hamilton Bhatt MD  [facility] Medicine

## 2025-03-27 VITALS
SYSTOLIC BLOOD PRESSURE: 142 MMHG | RESPIRATION RATE: 18 BRPM | WEIGHT: 169.53 LBS | OXYGEN SATURATION: 96 % | BODY MASS INDEX: 26.61 KG/M2 | HEART RATE: 93 BPM | TEMPERATURE: 97.8 F | DIASTOLIC BLOOD PRESSURE: 64 MMHG | HEIGHT: 67 IN

## 2025-03-27 LAB
BACTERIA BLD CULT: NORMAL
BACTERIA BLD CULT: NORMAL
ERYTHROCYTE [DISTWIDTH] IN BLOOD BY AUTOMATED COUNT: 13.2 % (ref 10–15)
HCT VFR BLD AUTO: 33.5 % (ref 40–53)
HGB BLD-MCNC: 11.3 G/DL (ref 13.3–17.7)
HOLD SPECIMEN: NORMAL
MAGNESIUM SERPL-MCNC: 1.7 MG/DL (ref 1.7–2.3)
MCH RBC QN AUTO: 29.5 PG (ref 26.5–33)
MCHC RBC AUTO-ENTMCNC: 33.7 G/DL (ref 31.5–36.5)
MCV RBC AUTO: 88 FL (ref 78–100)
METHYLMALONATE SERPL-SCNC: 0.16 UMOL/L (ref 0–0.4)
PLATELET # BLD AUTO: 165 10E3/UL (ref 150–450)
RBC # BLD AUTO: 3.83 10E6/UL (ref 4.4–5.9)
WBC # BLD AUTO: 7.3 10E3/UL (ref 4–11)

## 2025-03-27 PROCEDURE — 36415 COLL VENOUS BLD VENIPUNCTURE: CPT | Performed by: STUDENT IN AN ORGANIZED HEALTH CARE EDUCATION/TRAINING PROGRAM

## 2025-03-27 PROCEDURE — 85018 HEMOGLOBIN: CPT | Performed by: INTERNAL MEDICINE

## 2025-03-27 PROCEDURE — 250N000013 HC RX MED GY IP 250 OP 250 PS 637

## 2025-03-27 PROCEDURE — 250N000013 HC RX MED GY IP 250 OP 250 PS 637: Performed by: INTERNAL MEDICINE

## 2025-03-27 PROCEDURE — 250N000013 HC RX MED GY IP 250 OP 250 PS 637: Performed by: STUDENT IN AN ORGANIZED HEALTH CARE EDUCATION/TRAINING PROGRAM

## 2025-03-27 PROCEDURE — 99239 HOSP IP/OBS DSCHRG MGMT >30: CPT | Performed by: INTERNAL MEDICINE

## 2025-03-27 PROCEDURE — 83735 ASSAY OF MAGNESIUM: CPT | Performed by: STUDENT IN AN ORGANIZED HEALTH CARE EDUCATION/TRAINING PROGRAM

## 2025-03-27 RX ORDER — CIPROFLOXACIN 500 MG/1
500 TABLET, FILM COATED ORAL 2 TIMES DAILY
Qty: 50 TABLET | Refills: 0 | Status: SHIPPED | OUTPATIENT
Start: 2025-03-27 | End: 2025-04-21

## 2025-03-27 RX ORDER — NYSTATIN 100000 [USP'U]/ML
SUSPENSION ORAL
Qty: 360 ML | Refills: 0 | Status: SHIPPED | OUTPATIENT
Start: 2025-03-27 | End: 2025-04-05

## 2025-03-27 RX ADMIN — NYSTATIN 1000000 UNITS: 100000 SUSPENSION ORAL at 08:44

## 2025-03-27 RX ADMIN — AMLODIPINE BESYLATE 5 MG: 5 TABLET ORAL at 08:44

## 2025-03-27 RX ADMIN — NYSTATIN 1000000 UNITS: 100000 SUSPENSION ORAL at 12:10

## 2025-03-27 RX ADMIN — Medication 400 MG: at 08:44

## 2025-03-27 RX ADMIN — CYANOCOBALAMIN TAB 500 MCG 1000 MCG: 500 TAB at 08:44

## 2025-03-27 RX ADMIN — ACETAMINOPHEN 650 MG: 325 TABLET, FILM COATED ORAL at 13:57

## 2025-03-27 RX ADMIN — PANTOPRAZOLE SODIUM 20 MG: 20 TABLET, DELAYED RELEASE ORAL at 08:44

## 2025-03-27 RX ADMIN — METOPROLOL SUCCINATE 50 MG: 50 TABLET, EXTENDED RELEASE ORAL at 08:44

## 2025-03-27 ASSESSMENT — ACTIVITIES OF DAILY LIVING (ADL)
ADLS_ACUITY_SCORE: 36

## 2025-03-27 NOTE — PLAN OF CARE
"Patient is alert and oriented x4. He has complained of a headache and has taken tylenol to manage this. Patient is concerned about the information about his prostate and is worried that he may have prostate cancer. Patient has been frustrated that he is still incontinent of urine at times. Patient is aware of when he needs to go, but seems to struggle to hold it in. BP (!) 142/64 (BP Location: Left arm)   Pulse 93   Temp 97.8  F (36.6  C) (Oral)   Resp 18   Ht 1.702 m (5' 7\")   Wt 76.9 kg (169 lb 8.5 oz)   SpO2 96%   BMI 26.55 kg/m     Problem: Adult Inpatient Plan of Care  Goal: Plan of Care Review  Description: The Plan of Care Review/Shift note should be completed every shift.  The Outcome Evaluation is a brief statement about your assessment that the patient is improving, declining, or no change.  This information will be displayed automatically on your shiftnote.  Outcome: Progressing  Goal: Patient-Specific Goal (Individualized)  Description: You can add care plan individualizations to a care plan. Examples of Individualization might be:  \"Parent requests to be called daily at 9am for status\", \"I have a hard time hearing out of my right ear\", or \"Do not touch me to wake me up as it startlesme\".  Outcome: Progressing  Goal: Absence of Hospital-Acquired Illness or Injury  Outcome: Progressing  Intervention: Identify and Manage Fall Risk  Recent Flowsheet Documentation  Taken 3/27/2025 0851 by Zac Sosa, RN  Safety Promotion/Fall Prevention:   activity supervised   assistive device/personal items within reach   lighting adjusted   nonskid shoes/slippers when out of bed  Goal: Optimal Comfort and Wellbeing  Outcome: Progressing  Intervention: Monitor Pain and Promote Comfort  Recent Flowsheet Documentation  Taken 3/27/2025 1354 by Zac Sosa, RN  Pain Management Interventions: medication (see MAR)  Goal: Readiness for Transition of Care  Outcome: Progressing     Problem: Fall Injury Risk  Goal: " Absence of Fall and Fall-Related Injury  Outcome: Progressing  Intervention: Promote Injury-Free Environment  Recent Flowsheet Documentation  Taken 3/27/2025 0812 by Zac Sosa, RN  Safety Promotion/Fall Prevention:   activity supervised   assistive device/personal items within reach   lighting adjusted   nonskid shoes/slippers when out of bed     Problem: UTI (Urinary Tract Infection)  Goal: Improved Infection Symptoms  Outcome: Progressing   Goal Outcome Evaluation:

## 2025-03-27 NOTE — PLAN OF CARE
Goal Outcome Evaluation:      Plan of Care Reviewed With: patient    Overall Patient Progress: improvingOverall Patient Progress: improving     Alert and oriented. Using call light. Ambulating to bathroom with stand by assist. Balance is good and gait is steady. Patient anticipates being discharged today.

## 2025-03-27 NOTE — PROGRESS NOTES
"[place] [facility] DISCHARGE NOTE    Patient discharged to home at 5:10 PM via wheel chair. Accompanied by spouse and staff. Discharge instructions reviewed with patient and spouse, opportunity offered to ask questions. Prescriptions sent to patients preferred pharmacy. All belongings sent with patient.    Hannah Guy RN    BP (!) 142/64 (BP Location: Left arm)   Pulse 93   Temp 97.8  F (36.6  C) (Oral)   Resp 18   Ht 1.702 m (5' 7\")   Wt 76.9 kg (169 lb 8.5 oz)   SpO2 96%   BMI 26.55 kg/m      "

## 2025-03-27 NOTE — DISCHARGE SUMMARY
[facility]  Discharge Summary  Hospital Medicine       Date of Admission:  3/24/2025  Date of Discharge:  3/27/2025  Discharging Provider: Hamilton Bhatt MD      Identification and Chief Compaint: Arley Lopez is a 74 year old male who presented on 3/24/2025 with complaint of ***.    Discharge Diagnoses   ***     Follow-ups Needed After Discharge   Follow-up Appointments       Hospital Follow-up with Existing Primary Care Provider (PCP)      Please see details below         Schedule Primary Care visit within: 14 Days           {Additional follow-up instructions/to-do's for PCP    :***}    Unresulted Labs Ordered in the Past 30 Days of this Admission       Date and Time Order Name Status Description    3/24/2025  4:50 PM Blood Culture Peripheral Blood Preliminary     3/24/2025  4:50 PM Blood Culture Peripheral Blood Preliminary         These results will be followed up by ***    Hospital Course   Arley Lopez is a 74 year old male with PMHx of lightheadedness, memory loss, expressive aphagia, and urge incontinence who presents on 3/24/2025 with lightheadedness, falls, and urinary incontinence. On evaluation found to have infectious encephalopathy likely 2/2 urinary tract infection. Additionally with profound weakness with chronic lightheadedness, will need PT/OT evaluation prior to discharging.    Sepsis  Urinary tract infection (UTI) - ruled out  Possible prostatitis  Pharyngitis  Endorses urinary hesitancy with incontinence on admission, has been previously worked up with unremarkable urogram for incontinence. Admit UA showing pyuria (), large LE, and small blood. Afebrile with leukocytosis (WBC 26.2). Mildly tachycardic without hypotension or elevated lactate to support sepsis. Bolused with 1 liter LR PTA. Blood cultures collected and ceftriaxone initiated.   Prior Ucx 5/2024 without growth.  3/25: LR 1 liter bolus to complete 30 cc/kg fluid resuscitation followed by 125  "mL/hr continuously    UCx <1000 mixed urogenital taylor. Does not appear to be UTI.     Symptoms of chills, fatigue, weight loss for past few months. Marked leucocytosis persists. CRP checked today and is 202.     Could be prostatitis given symptoms and UA. PSA checked and is elevated 61 (new from a year ago). However, prostate is not tender on limited digital exam 3/26 and ceftriaxone should help.     C/o sore throat \"for a while\" though this is first complaint per wife. Possible posterior pharynx thrush on exam.    - continue ceftriaxone   - check CT chest/abdomen/pelvis today   - empiric nystatin swish and swallow, consider further testing if does not resolve    Acute metabolic encephalopathy  Chronic memory loss  Suspect acute confusion related to current sepsis/infection   Patient initially concerned about memory loss about 18 months ago, unclear what his baseline is. On admission he is does not respond to questions appropriately and having issues with following simple tasks. CT head and CTA head unremarkable. 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 No finding on CMP or CBC to explain encephalopathy.  UDS negative  TSH normal  B12 <400 (~310)  - Methylmalonic acid pending, if elevated confirms B12 deficiency   - Will empirically give b12 given low risk, 1 dose injection then PO     - If deficiency confirmed, recommend rechecking b12 +/- MMA in ~3 months after po supplementation, if still low patient may need to re-visit his PPI +/- consider pernicious workup (or give injections)  - Monitor for improvement with treatment of UTI    Chronic lightheadedness (suspect POTS, recheck outpatient after infection)  Generalized weakness  Falls    Reports fall x 2 times which occurred 3/24, however does have great recollection of this. Denies head injury or LOC. Does mention having some neck pain, CT c-spine unremarkable. He is not on PTA anticoagulation. Falls seem related to chronic " "lightheadedness and weakness. Evaluated by ENT 8/2024 for chronic dizziness/lightheadedness with unremarkable workup.  3/25: orthostats negative for BP change but did have nearly 20 point HR increase  - recommend outpatient orthostatic vitals to assess for POTS when infection resolves  - PT/OT evaluation     Essential hypertension    Chronic. Blood pressure 164/74. Manages PTA with amlodipine 5 mg and lisinopril. Lisinopril recently placed on hold 3/20 for lightheadedness.  - Continue amlodipine    Chronic kidney disease, stage 3a    Stable. Admit creatine 1.45 and GFR 51. Baseline creatine near 1.25.     Urge incontinence    Evaluated by urology 6/5/24 with unremarkable workup with CT urogram 7/2/24. Patient reports trial of medication but did not tolerate so was discontinued.    Gastroesophageal reflux disease (GERD)    Chronic. Managed PTA with pantoprazole 20 mg BID  - Continue pantoprazole BID        ***    Consultations This Hospital Stay   PHYSICAL THERAPY ADULT IP CONSULT  OCCUPATIONAL THERAPY ADULT IP CONSULT    Code Status   Full Code    The discharge plan was discussed with the patient ***, and *** expressed understanding.     Time Spent on this Encounter   Total time on this discharge was *** minutes.       Hamilton Bhatt MD  [facility]  ______________________________________________________________________    Physical Exam   Vital Signs: Temp: 97.8  F (36.6  C) Temp src: Oral BP: (!) 142/64 Pulse: 93   Resp: 18 SpO2: 96 % O2 Device: None (Room air)    Weight: 169 lbs 8.54 oz  Constitutional: ***  CV: Regular  Respiratory: CTA bilaterally  GI: Soft, nontender  Skin: Warm and dry  ***       Primary Care Physician   Steven Duane Semlorraine  [address] [facility] [address]     Discharge Disposition   { :3042396::\"Discharged to home\"}  Condition at discharge: {CONDITION:782104::\"Stable\"}    Significant Results and Procedures   {Data for Discharge " Summary:936539}  Procedures  None***    Discharge Orders      Reason for your hospital stay    Suspect prostatitis     Activity    Your activity upon discharge: activity as tolerated     Diet    Follow this diet upon discharge:       Regular Diet Adult     Hospital Follow-up with Existing Primary Care Provider (PCP)    Please see details below          Discharge Medications   Current Discharge Medication List        START taking these medications    Details   ciprofloxacin (CIPRO) 500 MG tablet Take 1 tablet (500 mg) by mouth 2 times daily for 25 days.  Qty: 50 tablet, Refills: 0    Associated Diagnoses: Acute prostatitis      nystatin (MYCOSTATIN) 397178 UNIT/ML suspension Swish and swallow 10 mLs (1,000,000 Units) in mouth 4 times daily for 4 days, THEN 10 mLs (1,000,000 Units) 4 times daily as needed (Recurrence of sore throat/mouth).  Qty: 360 mL, Refills: 0    Associated Diagnoses: Thrush           CONTINUE these medications which have NOT CHANGED    Details   amLODIPine (NORVASC) 5 MG tablet Take 1 tablet by mouth once daily for blood pressure  Qty: 90 tablet, Refills: 3    Associated Diagnoses: Essential hypertension      ASPIRIN 81 PO Take 1 tablet by mouth every evening.      atorvastatin (LIPITOR) 20 MG tablet Take 1 tablet by mouth daily.      metoprolol succinate ER (TOPROL-XL) 50 MG 24 hr tablet Take 1 tablet by mouth once daily for blood pressure  Qty: 90 tablet, Refills: 3    Associated Diagnoses: Essential hypertension      nortriptyline (PAMELOR) 10 MG capsule Take 1 capsule by mouth at bedtime  Qty: 90 capsule, Refills: 0    Associated Diagnoses: Irritable bowel syndrome with diarrhea      omeprazole (PRILOSEC) 20 MG DR capsule Take 1 capsule (20 mg) by mouth 2 times daily For stomach.  Qty: 180 capsule, Refills: 3    Associated Diagnoses: Gastroesophageal reflux disease without esophagitis           Allergies   Allergies   Allergen Reactions    Penicillin G Hives and Swelling         VERTEBRA: Normal vertebral body heights and alignment. No fracture or posttraumatic subluxation.     CANAL/FORAMINA: No canal or neural foraminal stenosis.    PARASPINAL: No extraspinal abnormality. Visualized lung fields are clear.      Impression    IMPRESSION:  HEAD CT:  1.  No acute intracranial process.    CERVICAL SPINE CT:  1.  No CT evidence for acute fracture or post traumatic subluxation.   CT Cervical Spine w/o Contrast    Narrative    EXAM: CT HEAD W/O CONTRAST, CT CERVICAL SPINE W/O CONTRAST  LOCATION: [facility]  DATE: 3/24/2025    INDICATION: Head and neck injury  COMPARISON: None.  TECHNIQUE:   1) Routine CT Head without IV contrast. Multiplanar reformats. Dose reduction techniques were used.  2) Routine CT Cervical Spine without IV contrast. Multiplanar reformats. Dose reduction techniques were used.    FINDINGS:   HEAD CT:   INTRACRANIAL CONTENTS: No intracranial hemorrhage, extraaxial collection, or mass effect.  No CT evidence of acute infarct. Moderate presumed chronic small vessel ischemic changes. Mild generalized volume loss. No hydrocephalus.     VISUALIZED ORBITS/SINUSES/MASTOIDS: No intraorbital abnormality. No paranasal sinus mucosal disease. No middle ear or mastoid effusion.    BONES/SOFT TISSUES: No acute abnormality.    CERVICAL SPINE CT:   VERTEBRA: Normal vertebral body heights and alignment. No fracture or posttraumatic subluxation.     CANAL/FORAMINA: No canal or neural foraminal stenosis.    PARASPINAL: No extraspinal abnormality. Visualized lung fields are clear.      Impression    IMPRESSION:  HEAD CT:  1.  No acute intracranial process.    CERVICAL SPINE CT:  1.  No CT evidence for acute fracture or post traumatic subluxation.   CTA Head Neck with Contrast    Narrative    EXAM: CTA HEAD NECK W CONTRAST  LOCATION: [facility]  DATE: 3/24/2025    INDICATION: right sided neck pain in setting of fall x two today. reports hx  of total ICA occlusion but unknown side  COMPARISON: None.  CONTRAST: 67 ml Isovue 370  TECHNIQUE: Head and neck CT angiogram with IV contrast. Axial helical CT images of the head and neck vessels obtained during the arterial phase of intravenous contrast administration. Axial 2D reconstructed images and multiplanar 3D MIP reconstructed   images of the head and neck vessels were performed by the technologist. Dose reduction techniques were used. All stenosis measurements made according to NASCET criteria unless otherwise specified.    FINDINGS:   HEAD CTA:  ANTERIOR CIRCULATION: Mild atherosclerotic changes cavernous and supraclinoid ICAs bilaterally. Hypoplastic right A1 with right A2 supplied in part from left anterior circulation.    POSTERIOR CIRCULATION: Short segment moderate narrowing proximal left P2. Dominant left and smaller right vertebral artery contribute to a normal basilar artery.     DURAL VENOUS SINUSES: Expected enhancement of the major dural venous sinuses.    NECK CTA:  RIGHT CAROTID: No measurable stenosis or dissection.    LEFT CAROTID: No measurable stenosis or dissection.    VERTEBRAL ARTERIES: Normal dominant left vertebral artery. Normal basilar artery. Only very faint and segmental visualization of nondominant right vertebral artery; this is suggestive of marked narrowing to occlusion, of uncertain acuity. Dominant left   vertebral artery.    AORTIC ARCH: Classic aortic arch anatomy with no significant stenosis at the origin of the great vessels.    NONVASCULAR STRUCTURES: 9 mm nodule left thyroid gland.      Impression    IMPRESSION:     HEAD CTA:   1.  Mild atherosclerotic changes cavernous and supraclinoid ICAs bilaterally.  2.  Short segment moderate narrowing proximal left P2.  3.  Intracranial circulation appears otherwise normal.    NECK CTA:  1.  No hemodynamically significant narrowing in either ICA.  2.  Normal dominant left vertebral artery.   3.  Normal basilar artery.   4.   Only very faint and segmental visualization of nondominant right vertebral artery; this is suggestive of marked narrowing to occlusion, of uncertain acuity.     CT Chest/Abdomen/Pelvis w Contrast    Narrative    EXAM: CT CHEST/ABDOMEN/PELVIS W CONTRAST  LOCATION: [facility]  DATE: 3/26/2025    INDICATION: Fever of unknown origin, eval chest for infiltrates, lymphadenopathy, eval abd pelvis for source of infection with special attention to prostate  COMPARISON: CT abdomen pelvis 7/2/2024  TECHNIQUE: CT scan of the chest, abdomen, and pelvis was performed following injection of IV contrast. Multiplanar reformats were obtained. Dose reduction techniques were used.   CONTRAST: 83 ml Isovue 370    FINDINGS:   LUNGS AND PLEURA: Normal.    MEDIASTINUM/AXILLAE: Normal.    CORONARY ARTERY CALCIFICATION: Moderate.    HEPATOBILIARY: No significant mass or bile duct dilatation. Cholecystectomy.     PANCREAS: Normal.    SPLEEN: Normal.    ADRENAL GLANDS: Normal.    KIDNEYS/BLADDER: No significant mass, stones, or hydronephrosis. There are simple or benign cysts. No follow up is needed. Unchanged peripherally calcified 1.0 cm distal right renal aneurysm. Mild bladder wall thickening with perivesical fat stranding.    BOWEL: Diverticulosis of the colon. No acute inflammatory change. No obstruction.     LYMPH NODES: Normal.    VASCULATURE: No abdominal aortic aneurysm.    PELVIC ORGANS: Enlarged and heterogeneously enhancing. No intraprostatic or periprostatic fluid collection.    MUSCULOSKELETAL: Mild degenerative changes in the spine.      Impression    IMPRESSION:  1.  Mild bladder wall thickening with perivesical fat stranding, which could be secondary to cystitis or chronic outlet obstruction. Correlate with urinalysis.  2.  Enlarged and heterogeneously enhancing prostate gland, which could be secondary to prostatitis. No intraprostatic or periprostatic fluid collection.  3.  No acute findings in  the chest.       Procedures  None    Discharge Orders      Reason for your hospital stay    Suspect prostatitis     Activity    Your activity upon discharge: activity as tolerated     Diet    Follow this diet upon discharge:       Regular Diet Adult     Hospital Follow-up with Existing Primary Care Provider (PCP)    Please see details below          Discharge Medications   Current Discharge Medication List        START taking these medications    Details   ciprofloxacin (CIPRO) 500 MG tablet Take 1 tablet (500 mg) by mouth 2 times daily for 25 days.  Qty: 50 tablet, Refills: 0    Associated Diagnoses: Acute prostatitis      nystatin (MYCOSTATIN) 560309 UNIT/ML suspension Swish and swallow 10 mLs (1,000,000 Units) in mouth 4 times daily for 4 days, THEN 10 mLs (1,000,000 Units) 4 times daily as needed (Recurrence of sore throat/mouth).  Qty: 360 mL, Refills: 0    Associated Diagnoses: Thrush           CONTINUE these medications which have NOT CHANGED    Details   amLODIPine (NORVASC) 5 MG tablet Take 1 tablet by mouth once daily for blood pressure  Qty: 90 tablet, Refills: 3    Associated Diagnoses: Essential hypertension      ASPIRIN 81 PO Take 1 tablet by mouth every evening.      atorvastatin (LIPITOR) 20 MG tablet Take 1 tablet by mouth daily.      metoprolol succinate ER (TOPROL-XL) 50 MG 24 hr tablet Take 1 tablet by mouth once daily for blood pressure  Qty: 90 tablet, Refills: 3    Associated Diagnoses: Essential hypertension      nortriptyline (PAMELOR) 10 MG capsule Take 1 capsule by mouth at bedtime  Qty: 90 capsule, Refills: 0    Associated Diagnoses: Irritable bowel syndrome with diarrhea      omeprazole (PRILOSEC) 20 MG DR capsule Take 1 capsule (20 mg) by mouth 2 times daily For stomach.  Qty: 180 capsule, Refills: 3    Associated Diagnoses: Gastroesophageal reflux disease without esophagitis           Allergies   Allergies   Allergen Reactions    Penicillin G Hives and Swelling

## 2025-03-29 LAB
ATRIAL RATE - MUSE: 105 BPM
BACTERIA BLD CULT: NO GROWTH
BACTERIA BLD CULT: NO GROWTH
DIASTOLIC BLOOD PRESSURE - MUSE: NORMAL MMHG
INTERPRETATION ECG - MUSE: NORMAL
P AXIS - MUSE: 54 DEGREES
PR INTERVAL - MUSE: 184 MS
QRS DURATION - MUSE: 92 MS
QT - MUSE: 326 MS
QTC - MUSE: 430 MS
R AXIS - MUSE: -13 DEGREES
SYSTOLIC BLOOD PRESSURE - MUSE: NORMAL MMHG
T AXIS - MUSE: 49 DEGREES
VENTRICULAR RATE- MUSE: 105 BPM

## 2025-04-08 ENCOUNTER — OFFICE VISIT (OUTPATIENT)
Dept: FAMILY MEDICINE | Facility: CLINIC | Age: 75
End: 2025-04-08
Payer: COMMERCIAL

## 2025-04-08 VITALS
SYSTOLIC BLOOD PRESSURE: 134 MMHG | OXYGEN SATURATION: 99 % | RESPIRATION RATE: 18 BRPM | HEART RATE: 83 BPM | WEIGHT: 172.8 LBS | BODY MASS INDEX: 27.12 KG/M2 | TEMPERATURE: 98.2 F | DIASTOLIC BLOOD PRESSURE: 80 MMHG | HEIGHT: 67 IN

## 2025-04-08 DIAGNOSIS — Z71.1 CONCERN ABOUT MEMORY: ICD-10-CM

## 2025-04-08 DIAGNOSIS — N41.0 PROSTATITIS, ACUTE: ICD-10-CM

## 2025-04-08 DIAGNOSIS — A41.9 SEPSIS WITHOUT ACUTE ORGAN DYSFUNCTION, DUE TO UNSPECIFIED ORGANISM (H): ICD-10-CM

## 2025-04-08 DIAGNOSIS — Z09 HOSPITAL DISCHARGE FOLLOW-UP: Primary | ICD-10-CM

## 2025-04-08 DIAGNOSIS — R73.09 ELEVATED GLUCOSE: ICD-10-CM

## 2025-04-08 DIAGNOSIS — R42 LIGHTHEADEDNESS: ICD-10-CM

## 2025-04-08 DIAGNOSIS — I25.10 ASCVD (ARTERIOSCLEROTIC CARDIOVASCULAR DISEASE): ICD-10-CM

## 2025-04-08 DIAGNOSIS — I10 ESSENTIAL HYPERTENSION: ICD-10-CM

## 2025-04-08 PROCEDURE — 99495 TRANSJ CARE MGMT MOD F2F 14D: CPT | Performed by: STUDENT IN AN ORGANIZED HEALTH CARE EDUCATION/TRAINING PROGRAM

## 2025-04-08 PROCEDURE — 3075F SYST BP GE 130 - 139MM HG: CPT | Performed by: STUDENT IN AN ORGANIZED HEALTH CARE EDUCATION/TRAINING PROGRAM

## 2025-04-08 PROCEDURE — 1126F AMNT PAIN NOTED NONE PRSNT: CPT | Performed by: STUDENT IN AN ORGANIZED HEALTH CARE EDUCATION/TRAINING PROGRAM

## 2025-04-08 PROCEDURE — 3079F DIAST BP 80-89 MM HG: CPT | Performed by: STUDENT IN AN ORGANIZED HEALTH CARE EDUCATION/TRAINING PROGRAM

## 2025-04-08 RX ORDER — ATORVASTATIN CALCIUM 20 MG/1
20 TABLET, FILM COATED ORAL DAILY
Qty: 90 TABLET | Refills: 3 | Status: SHIPPED | OUTPATIENT
Start: 2025-04-08

## 2025-04-08 RX ORDER — AMLODIPINE BESYLATE 5 MG/1
TABLET ORAL
Qty: 90 TABLET | Refills: 3 | Status: SHIPPED | OUTPATIENT
Start: 2025-04-08

## 2025-04-08 ASSESSMENT — PAIN SCALES - GENERAL: PAINLEVEL_OUTOF10: NO PAIN (0)

## 2025-04-08 NOTE — PROGRESS NOTES
Assessment & Plan     Hospital discharge follow-up  Prostatitis, acute, improved, likely resolved   Sepsis without acute organ dysfunction, due to unspecified organism (H), resolved   - PSA, total and free; Future    Essential hypertension, at goal <140/90  - refilled amLODIPine (NORVASC) 5 MG tablet; Take 1 tablet by mouth once daily for blood pressure    Elevated glucose  - Hemoglobin A1c; Future    Lightheadedness  > has been worked up in the past   - Orthostatic blood pressure and pulse showed blood pressure was not variable by 20+ points (however was very close blood pressure was 142 supine to 124 standing)  - Magnesium; Future    Concern about memory  > failed minicog today in clinic  - admits he has always struggled with memory ever since he was a child   - Adult Neuropsychology  Referral; Future    ASCVD (arteriosclerotic cardiovascular disease)  - Lipid panel reflex to direct LDL Fasting; Future  -refilled atorvastatin (LIPITOR) 20 MG tablet; Take 1 tablet (20 mg) by mouth daily.    Follow-up plan:   - return to clinic in 4 weeks to follow-up on labs and symptoms and get repeat orthostats   - if orthostats are still close to showing a 20 point difference can consider a tilt table test given it will have been 3 episodes of almost abnormal orthostats       The longitudinal plan of care for the diagnosis(es)/condition(s) as documented were addressed during this visit. Due to the added complexity in care, I will continue to support Arley in the subsequent management and with ongoing continuity of care.      Subjective   Arley is a 74 year old, presenting for the following health issues:  Hospital F/U, Recheck Medication, Hypertension, Lipids, and Health Maintenance (Declines vaccination)        4/8/2025     9:44 AM   Additional Questions   Roomed by Yocasta TOBAR LPN   Accompanied by self         4/8/2025     9:44 AM   Patient Reported Additional Medications   Patient reports taking the following new  "medications no new meds     HPI    Hyperlipidemia Follow-Up    Are you regularly taking any medication or supplement to lower your cholesterol?   Yes- Atorvastatin 20 mg daily  Are you having muscle aches or other side effects that you think could be caused by your cholesterol lowering medication?  No    Hypertension Follow-up    Do you check your blood pressure regularly outside of the clinic? Yes   Are you following a low salt diet? No  Are your blood pressures ever more than 140 on the top number (systolic) OR more   than 90 on the bottom number (diastolic), for example 140/90? Yes    BP Readings from Last 2 Encounters:   04/08/25 134/80   03/27/25 (!) 142/64           3/28/2025   Post Discharge Outreach   How are you doing now that you are home? \" I am doing good \"   How are your symptoms? (Red Flag symptoms escalate to triage hotline per guidelines) Improved   Does the patient have their discharge instructions?  Yes   Does the patient have questions regarding their discharge instructions?  No   Were you started on any new medications or were there changes to any of your previous medications?  Yes   Does the patient have all of their medications? Yes   Do you have questions regarding any of your medications?  No   Do you have all of your needed medical supplies or equipment (DME)?  (i.e. oxygen tank, CPAP, cane, etc.) Yes   Discharge Follow Up Appointment Date 4/8/2025   Discharge Follow Up Appointment Scheduled with? Primary Care Provider       Hospital Follow-up Visit:    Hospital/Nursing Home/IP Rehab Facility: [facility]  Most Recent Admission Date: 3/24/2025   Most Recent Admission Diagnosis: Hypomagnesemia - E83.42  Urinary tract infection without hematuria, site unspecified - N39.0  Sepsis without acute organ dysfunction, due to unspecified organism (H) - A41.9     Most Recent Discharge Date: 3/27/2025   Most Recent Discharge Diagnosis: Sepsis without acute organ dysfunction, " "due to unspecified organism (H) - A41.9  Urinary tract infection without hematuria, site unspecified - N39.0  Hypomagnesemia - E83.42  Acute prostatitis - N41.0  Thrush - B37.0   Was the patient in the ICU or did the patient experience delirium during hospitalization?  Yes         4/8/2025    10:03 AM   Mini-Cog Total Score   Mini Cog Total Score 2     Do you have any other stressors you would like to discuss with your provider? Health Concerns and OTHER: has been having a few dizzy spells after leaving the hospital. Would like to get checked for diabetes. Has been feeling tired and cold all the time.    Problems taking medications regularly:  None  Medication changes since discharge:   START taking these medications     Details   ciprofloxacin (CIPRO) 500 MG tablet Take 1 tablet (500 mg) by mouth 2 times daily for 25 days.  Qty: 50 tablet, Refills: 0     Associated Diagnoses: Acute prostatitis       nystatin (MYCOSTATIN) 152003 UNIT/ML suspension      Problems adhering to non-medication therapy:  None    Summary of hospitalization:  [facility] discharge summary reviewed  Diagnostic Tests/Treatments reviewed.  Follow up needed: repeat PSA after completing antibiotics and orthostats   Other Healthcare Providers Involved in Patient s Care:         None  Update since discharge: improved.         Plan of care communicated with patient           ROS:   As above         Objective    /80 (BP Location: Left arm, Patient Position: Sitting, Cuff Size: Adult Regular)   Pulse 83   Temp 98.2  F (36.8  C) (Tympanic)   Resp 18   Ht 1.71 m (5' 7.32\")   Wt 78.4 kg (172 lb 12.8 oz)   SpO2 99%   BMI 26.81 kg/m    Body mass index is 26.81 kg/m .  Physical Exam  Constitutional:       General: He is not in acute distress.  HENT:      Head: Normocephalic and atraumatic.      Right Ear: External ear normal.      Left Ear: External ear normal.      Mouth/Throat:      Mouth: Mucous membranes are moist.      " Pharynx: Oropharynx is clear. No oropharyngeal exudate or posterior oropharyngeal erythema.   Eyes:      Extraocular Movements: Extraocular movements intact.   Cardiovascular:      Rate and Rhythm: Normal rate and regular rhythm.      Heart sounds: Normal heart sounds.   Pulmonary:      Effort: Pulmonary effort is normal. No respiratory distress.      Breath sounds: Normal breath sounds. No wheezing or rhonchi.   Abdominal:      Palpations: Abdomen is soft. There is no mass.      Tenderness: There is no abdominal tenderness.   Musculoskeletal:         General: No deformity. Normal range of motion.      Cervical back: Normal range of motion and neck supple.   Skin:     General: Skin is warm.      Findings: No rash.   Neurological:      General: No focal deficit present.      Mental Status: He is alert and oriented to person, place, and time.   Psychiatric:         Mood and Affect: Mood normal.        Orthostatic Vitals from 04/06/25 1706 to 04/08/25 1706    Date and Time Orthostatic BP Orthostatic Pulse Patient Position BP   Location Cuff Size   04/08/25 1033 131/77 81 Standing Left arm Adult Regular   04/08/25 1031 124/75 82 Standing Left arm Adult Regular   04/08/25 1030 142/79 75 Supine Left arm Adult Regular   04/08/25 0957 -- -- Sitting Left arm Adult Regular                  Signed Electronically by: KEN OJEDA MD

## 2025-04-08 NOTE — PATIENT INSTRUCTIONS
Hello! It was a pleasure seeing you today. Just some things we discussed at today's visit:     Hospital discharge    Please make an appointment with the lab in 2 weeks (you should be done with antibiotics by then) to reassess your PSA levels   Please call  [phone] ([phone]) to schedule an appointment with the lab   Blood pressure and cholesterol   Please continue with amlodipine, we can still have you remain OFF the losartan   please make sure you are fasting for at least 8 hours prior to your cholesterol panel (we can check this the same day you are getting your PSA levels)  Memory concerns   Referral placed to neuropsychiatry for evaluation for cognitive decline/assess risk for dementia    Follow-up plan   See me again in 4 weeks to follow-up on blood pressure, lab results, and dizziness       Sincerely,     Mary Navarro MD

## 2025-04-22 ENCOUNTER — LAB (OUTPATIENT)
Dept: LAB | Facility: CLINIC | Age: 75
End: 2025-04-22
Payer: COMMERCIAL

## 2025-04-22 DIAGNOSIS — R97.20 ELEVATED PROSTATE SPECIFIC ANTIGEN (PSA): Primary | ICD-10-CM

## 2025-04-22 DIAGNOSIS — R42 LIGHTHEADEDNESS: ICD-10-CM

## 2025-04-22 DIAGNOSIS — R97.20 ELEVATED PROSTATE SPECIFIC ANTIGEN (PSA): ICD-10-CM

## 2025-04-22 DIAGNOSIS — R73.09 ELEVATED GLUCOSE: ICD-10-CM

## 2025-04-22 DIAGNOSIS — I25.10 ASCVD (ARTERIOSCLEROTIC CARDIOVASCULAR DISEASE): ICD-10-CM

## 2025-04-22 LAB
CHOLEST SERPL-MCNC: 126 MG/DL
EST. AVERAGE GLUCOSE BLD GHB EST-MCNC: 114 MG/DL
FASTING STATUS PATIENT QL REPORTED: YES
HBA1C MFR BLD: 5.6 % (ref 0–5.6)
HDLC SERPL-MCNC: 62 MG/DL
LDLC SERPL CALC-MCNC: 52 MG/DL
MAGNESIUM SERPL-MCNC: 1.8 MG/DL (ref 1.7–2.3)
NONHDLC SERPL-MCNC: 64 MG/DL
PSA FREE MFR SERPL: 12.43 %
PSA FREE SERPL-MCNC: 0.7 NG/ML
PSA SERPL DL<=0.01 NG/ML-MCNC: 5.63 NG/ML (ref 0–6.5)
TRIGL SERPL-MCNC: 61 MG/DL

## 2025-04-22 PROCEDURE — 83036 HEMOGLOBIN GLYCOSYLATED A1C: CPT

## 2025-04-22 PROCEDURE — 84153 ASSAY OF PSA TOTAL: CPT

## 2025-04-22 PROCEDURE — 84154 ASSAY OF PSA FREE: CPT

## 2025-04-22 PROCEDURE — 80061 LIPID PANEL: CPT

## 2025-04-22 PROCEDURE — 83735 ASSAY OF MAGNESIUM: CPT

## 2025-04-22 PROCEDURE — 36415 COLL VENOUS BLD VENIPUNCTURE: CPT

## 2025-04-24 NOTE — RESULT ENCOUNTER NOTE
Hello could someone please le the patient know to make an appointment with the lab in 3 months for repeat PSA labs? Thank you so much! -Mary HURST Jessica,     It is a pleasure providing you with medical care. I have received and reviewed your results, and have the following recommendations:     Based on your PSA levels your values are decreasing. I did reach out to the urologist for follow-up recommendations and our plan will be for you to repeat blood work in 3 months to continue trending your PSA levels if the values are lower/staying stable then we can hold off on additional blood work, but if the values are higher then I will refer you to urology.        Sincerely,     Mary Navarro MD

## 2025-05-14 ENCOUNTER — HOSPITAL ENCOUNTER (EMERGENCY)
Facility: CLINIC | Age: 75
Discharge: HOME | End: 2025-05-14
Attending: NURSE PRACTITIONER | Admitting: NURSE PRACTITIONER
Payer: COMMERCIAL

## 2025-05-14 VITALS
HEART RATE: 83 BPM | RESPIRATION RATE: 16 BRPM | OXYGEN SATURATION: 100 % | TEMPERATURE: 98.1 F | DIASTOLIC BLOOD PRESSURE: 90 MMHG | SYSTOLIC BLOOD PRESSURE: 169 MMHG

## 2025-05-14 DIAGNOSIS — J30.9 ALLERGIC RHINITIS, UNSPECIFIED SEASONALITY, UNSPECIFIED TRIGGER: ICD-10-CM

## 2025-05-14 DIAGNOSIS — A69.20 ERYTHEMA MIGRANS (LYME DISEASE): ICD-10-CM

## 2025-05-14 DIAGNOSIS — J32.9 SINUSITIS: ICD-10-CM

## 2025-05-14 PROCEDURE — G0463 HOSPITAL OUTPT CLINIC VISIT: HCPCS | Performed by: NURSE PRACTITIONER

## 2025-05-14 PROCEDURE — 99203 OFFICE O/P NEW LOW 30 MIN: CPT | Performed by: NURSE PRACTITIONER

## 2025-05-14 RX ORDER — FLUTICASONE PROPIONATE 50 MCG
1 SPRAY, SUSPENSION (ML) NASAL DAILY
Qty: 16 G | Refills: 0 | Status: SHIPPED | OUTPATIENT
Start: 2025-05-14

## 2025-05-14 RX ORDER — CETIRIZINE HYDROCHLORIDE 10 MG/1
10 TABLET ORAL DAILY
Qty: 14 TABLET | Refills: 0 | Status: SHIPPED | OUTPATIENT
Start: 2025-05-14 | End: 2025-05-14

## 2025-05-14 RX ORDER — CETIRIZINE HYDROCHLORIDE 10 MG/1
10 TABLET ORAL DAILY
Qty: 14 TABLET | Refills: 0 | Status: SHIPPED | OUTPATIENT
Start: 2025-05-14

## 2025-05-14 RX ORDER — DOXYCYCLINE 100 MG/1
100 CAPSULE ORAL 2 TIMES DAILY
Qty: 28 CAPSULE | Refills: 0 | Status: SHIPPED | OUTPATIENT
Start: 2025-05-14 | End: 2025-05-14

## 2025-05-14 RX ORDER — PREDNISONE 20 MG/1
TABLET ORAL
Qty: 5 TABLET | Refills: 0 | Status: SHIPPED | OUTPATIENT
Start: 2025-05-14 | End: 2025-05-21

## 2025-05-14 RX ORDER — FLUTICASONE PROPIONATE 50 MCG
1 SPRAY, SUSPENSION (ML) NASAL DAILY
Qty: 16 G | Refills: 0 | Status: SHIPPED | OUTPATIENT
Start: 2025-05-14 | End: 2025-05-14

## 2025-05-14 RX ORDER — DOXYCYCLINE 100 MG/1
100 CAPSULE ORAL 2 TIMES DAILY
Qty: 28 CAPSULE | Refills: 0 | Status: SHIPPED | OUTPATIENT
Start: 2025-05-14

## 2025-05-14 ASSESSMENT — COLUMBIA-SUICIDE SEVERITY RATING SCALE - C-SSRS
2. HAVE YOU ACTUALLY HAD ANY THOUGHTS OF KILLING YOURSELF IN THE PAST MONTH?: NO
6. HAVE YOU EVER DONE ANYTHING, STARTED TO DO ANYTHING, OR PREPARED TO DO ANYTHING TO END YOUR LIFE?: NO
1. IN THE PAST MONTH, HAVE YOU WISHED YOU WERE DEAD OR WISHED YOU COULD GO TO SLEEP AND NOT WAKE UP?: NO

## 2025-05-14 ASSESSMENT — ACTIVITIES OF DAILY LIVING (ADL): ADLS_ACUITY_SCORE: 53

## 2025-05-14 NOTE — ED PROVIDER NOTES
ED Provider Note  [facility]      History   No chief complaint on file.    HPI  Arley Lopez is a 74 year old male who presents with sensation of stabbing ear pain, ear congestion on his right side.  He reports that he was bit by a deer tick several days ago and now there is a surrounding redness around the bite.  Patient denies any fever or chills.  Denies any visual acuity changes, balance changes or issues and denies any other neurological concerns.  He does endorse having some nasal congestion some sinus congestion reports this worsened after mowing the lawn yesterday and wearing ear protection.  Has a history of a deviated septum on his left side of his nose.  Denies any fever cough, does report having postnasal drainage.  Medical history of hypertension, GERD, IBS and previous sepsis with infection.  Reports that he is seen and followed by [facility] for primary care.            Allergies:  Allergies   Allergen Reactions    Penicillin G Hives and Swelling       Problem List:    Patient Active Problem List    Diagnosis Date Noted    Urinary tract infection without hematuria, site unspecified 03/24/2025     Priority: Medium    Sepsis without acute organ dysfunction, due to unspecified organism (H) 03/24/2025     Priority: Medium    Lactose intolerance 11/15/2019     Priority: Medium    Irritable bowel syndrome with diarrhea 12/07/2015     Priority: Medium    CARDIOVASCULAR SCREENING; LDL GOAL LESS THAN 130 02/27/2013     Priority: Medium    GERD (gastroesophageal reflux disease) 07/12/2012     Priority: Medium    Essential hypertension 07/12/2012     Priority: Medium        Past Medical History:    Past Medical History:   Diagnosis Date    Acute calculous cholecystitis 11/23/2019    Acute cholecystitis 11/23/2019    Essential hypertension     GERD (gastroesophageal reflux disease)     Kidney problem        Past Surgical History:    Past Surgical History:   Procedure Laterality  Date    COLONOSCOPY  May 2012    abnormal !    ENDOSCOPY  01/10/2011    Reflux duodenum, Hiatal hernia small, otherwise normal exam.    FOOT SURGERY Left     GENITOURINARY SURGERY      vasectomy    GENITOURINARY SURGERY      spermacele    LAPAROSCOPIC CHOLECYSTECTOMY N/A 2019    Procedure: CHOLECYSTECTOMY, LAPAROSCOPIC;  Surgeon: Yehuda Puga MD;  Location: [facility] OR       Social History:  Marital Status:   [2]  Social History     Tobacco Use    Smoking status: Former     Current packs/day: 0.00     Average packs/day: 2.0 packs/day for 2.0 years (4.0 ttl pk-yrs)     Types: Cigarettes     Start date: 1990     Quit date: 1992     Years since quittin.3    Smokeless tobacco: Never   Vaping Use    Vaping status: Never Used   Substance Use Topics    Alcohol use: Yes     Comment: beer per monthly    Drug use: No        Medications:    cetirizine (ZYRTEC) 10 MG tablet  doxycycline hyclate (VIBRAMYCIN) 100 MG capsule  fluticasone (FLONASE) 50 MCG/ACT nasal spray  predniSONE (DELTASONE) 20 MG tablet  amLODIPine (NORVASC) 5 MG tablet  ASPIRIN 81 PO  atorvastatin (LIPITOR) 20 MG tablet  metoprolol succinate ER (TOPROL-XL) 50 MG 24 hr tablet  nortriptyline (PAMELOR) 10 MG capsule  omeprazole (PRILOSEC) 20 MG DR capsule          Review of Systems  A medically appropriate review of systems was performed with pertinent positives and negatives noted in the HPI, and all other systems negative.    Physical Exam     Patient Vitals for the past 24 hrs:   BP Temp Temp src Pulse Resp SpO2   25 1256 (!) 169/90 98.1  F (36.7  C) Tympanic 83 16 100 %          Physical Exam  General: alert and in no acute distress on arrival  Head: atraumatic, normocephalic, Eyes:  non-traumatic.  Left Eyes: Non-reddened conjunctiva, no icterus, noninjected, normal pupillary response to light. Normal extraocular movement.  Right eye: Non-reddened conjunctiva, no icterus, noninjected, normal pupillary response to light. Normal  extraocular movement bilateral. No drainage present. ENT: Left Ear: TM intact, bulging of the TM present, middle ear is not erythremic, no purulence, canal patent. Right Ear: TM intact, bulging of TM present.  Middle ear is not erythremic, no purulence, canal patent. Nose: No erythema or edema patent nostrils bilateral. Throat: midline uvula, non-erythremic, non-enlarged tonsils, without exudate.  No cervical adenopathy.  Cobblestoning present with a moderate amount of clear postnasal drainage present.  Lungs:  nonlabored, CTA bilateral throughout.  CV: Regular rate and rhythm, extremities warm and perfused  Abd: nondistended, nontender, normal bowel sounds.  Skin: Erythema migrans rash on abdomen approximately 1 inch round size in the center is where her tick was removed there is no residual tick in the skin, no diaphoresis and skin color normal  Neuro: Patient awake, alert, speech is fluent, no focal deficits  Psychiatric: affect/mood normal, appropriate historian      ED Course                 Procedures                    No results found for this or any previous visit (from the past 48 hours).    MEDICATIONS GIVEN IN THE EMERGENCY DEPARTMENT:  Medications - No data to display             Assessments & Plan (with Medical Decision Making)  74 year old male who presents to the Urgent Care for evaluation of patient reports stabbing-like sensation inside his ear on his right side and ear fullness.  Denies any hearing change.  Reports pain in his ear began yesterday after removing ear protection but will always monitor the lawn.  In the past has reported that he has not had seasonal allergies.  He did remove the deer tick from his abdomen 2 days ago and currently there is a erythema migrans rash on his abdomen.  There is no residual tick in the skin.  Patient denies any fever or chills, visual acuity changes, neurologic changes including no balance changes.  Diagnoses: Erythema migrans, ordered doxycycline twice  daily for 14 days recommended the patient is seen in 1 to 2 weeks in primary care clinic they may choose to extend his antibiotics at that time.  Reviewed testing recommendations for Lyme's disease including testing approximately 6 weeks from the tick bite to prevent false negative testing results.  Allergic rhinitis, sinusitis, ordered fluticasone nasal spray showed patient to use this and the chin tilt on technique to improve efficacy.  Also ordered Zyrtec daily for the next 14 days.  Short course of prednisone was ordered due to severe pain reported in his ear.  This will help with some of the swelling that may be happening surrounding trigeminal nerve.  Encouraged him to return if his symptoms are not improving despite recommended treatment plan.  Requested that he is seen for follow-up in the next 1 to 2 weeks at his primary care clinic.  Advised to return here or in his primary clinic if his symptoms are not improving or if they worsen at all.  Patient was discharged home in stable condition.    Patient verbalized agreement with and understanding of the rational for the diagnosis and treatment plan.  All questions were answered to best of my ability and the patient's satisfaction. The patient was advised to contact or return to their primary clinic or UC/ED with any questions that may arise after this visit.       I have reviewed the nursing notes.    I have reviewed the findings, diagnosis, plan and need for follow up with the patient.        NEW PRESCRIPTIONS STARTED AT TODAY'S ER VISIT  New Prescriptions    CETIRIZINE (ZYRTEC) 10 MG TABLET    Take 1 tablet (10 mg) by mouth daily.    DOXYCYCLINE HYCLATE (VIBRAMYCIN) 100 MG CAPSULE    Take 1 capsule (100 mg) by mouth 2 times daily.    FLUTICASONE (FLONASE) 50 MCG/ACT NASAL SPRAY    Spray 1 spray into both nostrils daily.    PREDNISONE (DELTASONE) 20 MG TABLET    1 tab daily for 3 days, then 1/2 tab daily for 4 days       Final diagnoses:   Erythema migrans  (Lyme disease)   Sinusitis   Allergic rhinitis, unspecified seasonality, unspecified trigger       5/14/2025   [facility] EMERGENCY DEPT    Disclaimer: This note consists of symbols derived from keyboarding, dictation, and/or voice recognition software. As a result, there may be errors in the script that have gone undetected.  Please consider this when interpreting information found in the chart.      Vidhya Blackman, APRN CNP  05/14/25 [id number]

## 2025-05-14 NOTE — DISCHARGE INSTRUCTIONS
Recommend making a follow-up appointment in your primary care clinic in the next 2 weeks they may want to extend antibiotics for Lyme's disease treatment.  Doxycycline is ordered for you twice daily for the next 2 weeks that should prevent you from developing Lyme's disease complications.  It appears that you have sinusitis and ear fullness related to this I have ordered fluticasone nasal spray to be used and the chin tilt on technique shown to you in urgent care today and Zyrtec daily.

## 2025-05-21 ENCOUNTER — OFFICE VISIT (OUTPATIENT)
Dept: FAMILY MEDICINE | Facility: CLINIC | Age: 75
End: 2025-05-21
Payer: COMMERCIAL

## 2025-05-21 VITALS
HEART RATE: 75 BPM | OXYGEN SATURATION: 98 % | HEIGHT: 68 IN | SYSTOLIC BLOOD PRESSURE: 138 MMHG | BODY MASS INDEX: 26.16 KG/M2 | WEIGHT: 172.6 LBS | TEMPERATURE: 98.1 F | DIASTOLIC BLOOD PRESSURE: 78 MMHG | RESPIRATION RATE: 12 BRPM

## 2025-05-21 DIAGNOSIS — A69.20 ERYTHEMA MIGRANS (LYME DISEASE): Primary | ICD-10-CM

## 2025-05-21 DIAGNOSIS — J30.2 SEASONAL ALLERGIC RHINITIS, UNSPECIFIED TRIGGER: ICD-10-CM

## 2025-05-21 PROCEDURE — 99213 OFFICE O/P EST LOW 20 MIN: CPT | Performed by: FAMILY MEDICINE

## 2025-05-21 PROCEDURE — 3078F DIAST BP <80 MM HG: CPT | Performed by: FAMILY MEDICINE

## 2025-05-21 PROCEDURE — 1125F AMNT PAIN NOTED PAIN PRSNT: CPT | Performed by: FAMILY MEDICINE

## 2025-05-21 PROCEDURE — 3075F SYST BP GE 130 - 139MM HG: CPT | Performed by: FAMILY MEDICINE

## 2025-05-21 PROCEDURE — G2211 COMPLEX E/M VISIT ADD ON: HCPCS | Performed by: FAMILY MEDICINE

## 2025-05-21 ASSESSMENT — PAIN SCALES - GENERAL: PAINLEVEL_OUTOF10: MILD PAIN (2)

## 2025-05-21 NOTE — PATIENT INSTRUCTIONS
Make sure to take the full course of doxycycline for treatment of lyme disease.    Continue flonase 1 spray to each nostril daily until the bottle is finished.  May continue cetirizine 10 mg nightly for next few weeks.

## 2025-05-21 NOTE — PROGRESS NOTES
"  Assessment & Plan     Erythema migrans (Lyme disease)  Abdominal rash small and per patient not expanding since ER visit last week.  Continue doxycycline to complete 14 day course.  Return precautions discussed and given to patient.     Seasonal allergic rhinitis, unspecified trigger  Symptoms controlled per patient.  Continue flonase and cetirizine for another 3-4 weeks.  Return precautions discussed and given to patient.           MED REC REQUIRED  Post Medication Reconciliation Status: discharge medications reconciled, continue medications without change      Follow-up   Return in about 2 months (around 7/21/2025) for In-clinic visit for if with new type of pain.        Morena Tubbs is a 74 year old, presenting for the following health issues:  ER F/U (5/14/25)        5/21/2025     8:58 AM   Additional Questions   Roomed by Hilda HEATH   Accompanied by self     HPI      ED/UC Followup:    Facility:  [facility]  Date of visit: 5/14/25  Reason for visit: tick bite, sinusitis, allergic rhinitis, ear pain  Current Status: Continues to have some pain above the right eye, has been using zyrtec and flonase and this has improved the ear pain and sinus pressure.    Was treated for erythema migrans with doxycycline. Is taking it and is on day 7 today.          Review of Systems  Constitutional, HEENT, cardiovascular, pulmonary, GI, , musculoskeletal, neuro, skin, endocrine and psych systems are negative, except as otherwise noted.      Objective    /78 (BP Location: Right arm, Patient Position: Chair, Cuff Size: Adult Regular)   Pulse 75   Temp 98.1  F (36.7  C) (Tympanic)   Resp 12   Ht 1.721 m (5' 7.75\")   Wt 78.3 kg (172 lb 9.6 oz)   SpO2 98%   BMI 26.44 kg/m    Body mass index is 26.44 kg/m .  Physical Exam   GENERAL:  alert and no distress  EYES: Eyes grossly normal to inspection, extraocular movements - intact, and PERRL  HENT: no facial asymmetry, Ears - tympanic membrane intact and " nonerythematous; Nose - pink swollen turbinates, small amt of clcear nasal mucus prsent, no sinus tenderness bilaterally; throat nonerythematous  NECK: nontender anterior cervical lymphadenopathy present.  RESP: lungs clear to auscultation - no rales, no rhonchi, no wheezes  CV: regular rates and rhythm, normal S1 S2, no S3 or S4 and no murmur  SKIN: on RLQ abdominal wall, there is a 2 cm x 4 cm ertyhematous patch with mild scaling but no raised border or erythematous ring; no other rash    No results found for any visits on 05/21/25.        Signed Electronically by: Felix Schofield MD

## 2025-05-27 ENCOUNTER — TELEPHONE (OUTPATIENT)
Dept: FAMILY MEDICINE | Facility: CLINIC | Age: 75
End: 2025-05-27
Payer: COMMERCIAL

## 2025-05-27 NOTE — LETTER
Deb Tubbs,      Your care team at [facility] values your health and well-being. After reviewing your chart, we have identified recommendation(s) to help you better manage your health.     It's time for your Medicare Annual Wellness Visit. During your visit, we'll discuss your health, well-being, and any questions you may have related to preventive care. We'll also review any recommended tests, exams, or screenings you might need. To schedule please call your clinic at [phone] or use your [employer] account.     If you recently had or are having this visit soon, please contact the clinic via phone or [employer] so that your care team can update your records.        If you have any questions or concerns, please contact our clinic at [phone]. Thank you for continuing to trust us with your care.     Sincerely,     Your [facility] Care Team        Electronically signed

## 2025-05-27 NOTE — LETTER
June 2, 2025    Arley HURST Jessica  [address]  [facility] [address]        Deb Tubbs,      Your care team at [facility] values your health and well-being. After reviewing your chart, we have identified recommendation(s) to help you better manage your health.     It's time for your Medicare Annual Wellness Visit. During your visit, we'll discuss your health, well-being, and any questions you may have related to preventive care. We'll also review any recommended tests, exams, or screenings you might need. To schedule please call your clinic at [phone] or use your [employer] account.     If you recently had or are having this visit soon, please contact the clinic via phone or [employer] so that your care team can update your records.        If you have any questions or concerns, please contact our clinic at [phone]. Thank you for continuing to trust us with your care.     Sincerely,     Your [facility] Care Team          Electronically signed

## 2025-05-27 NOTE — TELEPHONE ENCOUNTER
Patient Quality Outreach    Patient is due for the following:   Physical Annual Wellness Visit    Action(s) Taken:   Schedule a Annual Wellness Visit    Type of outreach:    Sent [employer] message. Postponing for 1 week to see if patient views message.    Questions for provider review:    None         Yocasta TOBAR LPN  Chart routed to Care Team.

## 2025-06-14 ENCOUNTER — HEALTH MAINTENANCE LETTER (OUTPATIENT)
Age: 75
End: 2025-06-14

## 2025-06-17 ENCOUNTER — OFFICE VISIT (OUTPATIENT)
Dept: FAMILY MEDICINE | Facility: CLINIC | Age: 75
End: 2025-06-17
Payer: COMMERCIAL

## 2025-06-17 VITALS
SYSTOLIC BLOOD PRESSURE: 148 MMHG | DIASTOLIC BLOOD PRESSURE: 68 MMHG | BODY MASS INDEX: 26.07 KG/M2 | HEIGHT: 68 IN | TEMPERATURE: 98.1 F | OXYGEN SATURATION: 98 % | HEART RATE: 71 BPM | WEIGHT: 172 LBS | RESPIRATION RATE: 16 BRPM

## 2025-06-17 DIAGNOSIS — B35.4 TINEA CORPORIS: Primary | ICD-10-CM

## 2025-06-17 PROCEDURE — 3077F SYST BP >= 140 MM HG: CPT | Performed by: NURSE PRACTITIONER

## 2025-06-17 PROCEDURE — 99213 OFFICE O/P EST LOW 20 MIN: CPT | Performed by: NURSE PRACTITIONER

## 2025-06-17 PROCEDURE — 3078F DIAST BP <80 MM HG: CPT | Performed by: NURSE PRACTITIONER

## 2025-06-17 RX ORDER — FLUCONAZOLE 150 MG/1
150 TABLET ORAL
Qty: 6 TABLET | Refills: 0 | Status: SHIPPED | OUTPATIENT
Start: 2025-06-17 | End: 2025-07-23

## 2025-06-17 NOTE — PROGRESS NOTES
"  Assessment & Plan     Tinea corporis    - fluconazole (DIFLUCAN) 150 MG tablet; Take 1 tablet (150 mg) by mouth every 7 days for 6 doses.          BMI  Estimated body mass index is 26.35 kg/m  as calculated from the following:    Height as of this encounter: 1.721 m (5' 7.75\").    Weight as of this encounter: 78 kg (172 lb).         Notify me in 2-3 weeks if not improving and we will have you see DERM. Also in the differential is psoriasis although I would expect that to itch more intensely.     Subjective   Arley is a 74 year old, presenting for the following health issues:  Derm Problem        6/17/2025     2:32 PM   Additional Questions   Roomed by Irene PLATA     History of Present Illness       Reason for visit:  Spots  Symptom onset:  1-3 days ago  Symptoms include:  Spots  Symptom intensity:  Severe  Symptom progression:  Staying the same  Had these symptoms before:  No  What makes it worse:  No   He is taking medications regularly.        Rash  Onset/Duration: 2 days  Description  Location: underarms, bottom  Character: blotchy, raised, red  Itching: mild  Intensity:  moderate  Progression of Symptoms:  same  Accompanying signs and symptoms:   Fever: No  Body aches or joint pain: No  Sore throat symptoms: No  Recent cold symptoms: No  History:           Previous episodes of similar rash: None  New exposures:  mowed the grass a couple days ago   Recent travel: No  Exposure to similar rash: No  Precipitating or alleviating factors: none   Therapies tried and outcome: none      Noticed rash after mowing in the hot weather yesterday.      Review of Systems  Constitutional, HEENT, cardiovascular, pulmonary, gi and gu systems are negative, except as otherwise noted.      Objective    BP (!) 148/68   Pulse 71   Temp 98.1  F (36.7  C) (Tympanic)   Resp 16   Ht 1.721 m (5' 7.75\")   Wt 78 kg (172 lb)   SpO2 98%   BMI 26.35 kg/m    Body mass index is 26.35 kg/m .  Physical Exam   GENERAL: alert and no " distress  MS: no gross musculoskeletal defects noted, no edema  SKIN: annular patches spread to low back, bilateral axilla, groin and buttocks  NEURO: Normal strength and tone, mentation intact and speech normal               Media Information    Document Information    Other: Photograph   Left axilla   06/17/2025 3:04 PM   Attached To:   Office Visit on 6/17/25 with Opal Gaviria APRN CNP   Source Information    Opal Gaviria APRN CNP  [facility]   Document History         Media Information    Document Information    Other: Photograph   Low back   06/17/2025 3:04 PM   Attached To:   Office Visit on 6/17/25 with Opal Gaviria APRN CNP   Source Information    Opal Gaviria APRN CNP [facility]   Document History      Signed Electronically by: BARBARA Spring CNP

## 2025-08-01 DIAGNOSIS — J30.2 SEASONAL ALLERGIC RHINITIS, UNSPECIFIED TRIGGER: Primary | ICD-10-CM

## 2025-08-04 RX ORDER — FLUTICASONE PROPIONATE 50 MCG
1 SPRAY, SUSPENSION (ML) NASAL DAILY
Qty: 16 G | Refills: 0 | Status: SHIPPED | OUTPATIENT
Start: 2025-08-04

## (undated) DEVICE — ESU HOLSTER PLASTIC DISP E2400

## (undated) DEVICE — ENDO TROCAR FIRST ENTRY KII FIOS ADV FIX 11X100MM CFF33

## (undated) DEVICE — SURGICEL ABSORBABLE HEMOSTAT SNOW 2"X4" 2082

## (undated) DEVICE — CLIP APPLIER ENDO 5MM M/L LIGAMAX EL5ML

## (undated) DEVICE — ESU CORD MONOPOLAR 10'  E0510

## (undated) DEVICE — ENDO FLOSEAL APPLICATOR 1500181

## (undated) DEVICE — ADH SKIN CLOSURE PREMIERPRO EXOFIN 1.0ML 3470

## (undated) DEVICE — SOL NACL 0.9% IRRIG 3000ML BAG 07972-08

## (undated) DEVICE — SUCTION IRRIGATION STRYKFLOW II W/TIP DISP 250-070-520

## (undated) DEVICE — ESU ENDO SCISSORS 5MM CVD 5DCS

## (undated) DEVICE — ENDO TROCAR BLUNT TIP KII BALLOON 12X100MM C0R47

## (undated) DEVICE — ENDO POUCH UNIV RETRIEVAL SYSTEM INZII 10MM CD001

## (undated) DEVICE — GOWN XLG DISP 9545

## (undated) DEVICE — GLOVE PROTEXIS W/NEU-THERA 7.5  2D73TE75

## (undated) DEVICE — SU VICRYL 4-0 FS-2 27" J422-H

## (undated) DEVICE — DRAIN JACKSON PRATT 10MM FLAT 4/4 PERF SU130-1311

## (undated) DEVICE — ADH FLOSEAL W/HUMAN THROMBIN 5ML

## (undated) DEVICE — SU ETHILON 2-0 FS 18" 664G

## (undated) DEVICE — DRAIN JACKSON PRATT RESERVOIR 100ML SU130-1305

## (undated) DEVICE — PREP CHLORAPREP 26ML TINTED ORANGE  260815

## (undated) DEVICE — Device

## (undated) DEVICE — SU VICRYL 0 UR-6 27" J603H

## (undated) DEVICE — SOL NACL 0.9% IRRIG 1000ML BOTTLE 07138-09

## (undated) DEVICE — ENDO TROCAR FIRST ENTRY KII FIOS ADV FIX 05X100MM CFF03

## (undated) DEVICE — DRAPE POUCH INSTRUMENT 3 POCKET 1018L

## (undated) DEVICE — STOCKING SLEEVE COMPRESSION CALF LG

## (undated) DEVICE — ENDO TROCAR SLEEVE KII ADV FIXATION 05X100MM CFS02

## (undated) DEVICE — SOL WATER IRRIG 1000ML BOTTLE 07139-09

## (undated) DEVICE — DECANTER VIAL 2006S

## (undated) RX ORDER — NEOSTIGMINE METHYLSULFATE 1 MG/ML
VIAL (ML) INJECTION
Status: DISPENSED
Start: 2019-11-24

## (undated) RX ORDER — LIDOCAINE HYDROCHLORIDE 10 MG/ML
INJECTION, SOLUTION EPIDURAL; INFILTRATION; INTRACAUDAL; PERINEURAL
Status: DISPENSED
Start: 2019-11-24

## (undated) RX ORDER — GLYCOPYRROLATE 0.2 MG/ML
INJECTION, SOLUTION INTRAMUSCULAR; INTRAVENOUS
Status: DISPENSED
Start: 2019-11-24

## (undated) RX ORDER — BUPIVACAINE HYDROCHLORIDE AND EPINEPHRINE 5; 5 MG/ML; UG/ML
INJECTION, SOLUTION PERINEURAL
Status: DISPENSED
Start: 2019-11-24

## (undated) RX ORDER — FENTANYL CITRATE 50 UG/ML
INJECTION, SOLUTION INTRAMUSCULAR; INTRAVENOUS
Status: DISPENSED
Start: 2019-11-24

## (undated) RX ORDER — KETOROLAC TROMETHAMINE 30 MG/ML
INJECTION, SOLUTION INTRAMUSCULAR; INTRAVENOUS
Status: DISPENSED
Start: 2019-11-24

## (undated) RX ORDER — ONDANSETRON 2 MG/ML
INJECTION INTRAMUSCULAR; INTRAVENOUS
Status: DISPENSED
Start: 2019-11-24

## (undated) RX ORDER — PROPOFOL 10 MG/ML
INJECTION, EMULSION INTRAVENOUS
Status: DISPENSED
Start: 2019-11-24

## (undated) RX ORDER — DEXAMETHASONE SODIUM PHOSPHATE 10 MG/ML
INJECTION, SOLUTION INTRAMUSCULAR; INTRAVENOUS
Status: DISPENSED
Start: 2019-11-24